# Patient Record
Sex: FEMALE | Race: WHITE | NOT HISPANIC OR LATINO | Employment: OTHER | URBAN - METROPOLITAN AREA
[De-identification: names, ages, dates, MRNs, and addresses within clinical notes are randomized per-mention and may not be internally consistent; named-entity substitution may affect disease eponyms.]

---

## 2017-12-19 LAB — RAPID GROUP A STREP (OHS): POSITIVE

## 2020-12-11 ENCOUNTER — HISTORICAL (OUTPATIENT)
Dept: ADMINISTRATIVE | Facility: HOSPITAL | Age: 65
End: 2020-12-11

## 2021-01-20 ENCOUNTER — HISTORICAL (OUTPATIENT)
Dept: ADMINISTRATIVE | Facility: HOSPITAL | Age: 66
End: 2021-01-20

## 2021-04-29 ENCOUNTER — HISTORICAL (OUTPATIENT)
Dept: ADMINISTRATIVE | Facility: HOSPITAL | Age: 66
End: 2021-04-29

## 2021-04-29 LAB
ABS NEUT (OLG): 3.14 X10(3)/MCL (ref 2.1–9.2)
ALBUMIN SERPL-MCNC: 4.2 GM/DL (ref 3.4–4.8)
ALBUMIN/GLOB SERPL: 1.1 RATIO (ref 1.1–2)
ALP SERPL-CCNC: 32 UNIT/L (ref 40–150)
ALT SERPL-CCNC: 13 UNIT/L (ref 0–55)
AST SERPL-CCNC: 16 UNIT/L (ref 5–34)
BASOPHILS # BLD AUTO: 0.05 X10(3)/MCL (ref 0–0.2)
BASOPHILS NFR BLD AUTO: 0.8 % (ref 0–1)
BILIRUB SERPL-MCNC: 0.3 MG/DL (ref 0.2–1.2)
BILIRUBIN DIRECT+TOT PNL SERPL-MCNC: 0.1 MG/DL (ref 0–0.5)
BILIRUBIN DIRECT+TOT PNL SERPL-MCNC: 0.2 MG/DL (ref 0–0.8)
BUN SERPL-MCNC: 23.2 MG/DL (ref 9.8–20.1)
CALCIUM SERPL-MCNC: 9.8 MG/DL (ref 8.4–10.2)
CHLORIDE SERPL-SCNC: 107 MMOL/L (ref 98–107)
CO2 SERPL-SCNC: 25 MMOL/L (ref 23–31)
CREAT SERPL-MCNC: 0.91 MG/DL (ref 0.57–1.11)
EOSINOPHIL # BLD AUTO: 0.28 X10(3)/MCL (ref 0–0.9)
EOSINOPHIL NFR BLD AUTO: 4.8 % (ref 0–6.4)
ERYTHROCYTE [DISTWIDTH] IN BLOOD BY AUTOMATED COUNT: 12.8 % (ref 11.5–17)
GLOBULIN SER-MCNC: 3.8 GM/DL (ref 2.4–3.5)
GLUCOSE SERPL-MCNC: 102 MG/DL (ref 82–115)
HCT VFR BLD AUTO: 40.8 % (ref 37–47)
HGB BLD-MCNC: 13 GM/DL (ref 12–16)
IMM GRANULOCYTES # BLD AUTO: 0.01 10*3/UL (ref 0–0.02)
IMM GRANULOCYTES NFR BLD AUTO: 0.2 % (ref 0–0.43)
LYMPHOCYTES # BLD AUTO: 1.9 X10(3)/MCL (ref 0.6–4.6)
LYMPHOCYTES NFR BLD AUTO: 32.3 % (ref 16–44)
MCH RBC QN AUTO: 29.8 PG (ref 27–31)
MCHC RBC AUTO-ENTMCNC: 31.9 GM/DL (ref 33–36)
MCV RBC AUTO: 93.6 FL (ref 80–94)
MONOCYTES # BLD AUTO: 0.51 X10(3)/MCL (ref 0.1–1.3)
MONOCYTES NFR BLD AUTO: 8.7 % (ref 4–12.1)
NEUTROPHILS # BLD AUTO: 3.14 X10(3)/MCL (ref 2.1–9.2)
NEUTROPHILS NFR BLD AUTO: 53.2 % (ref 43–73)
NRBC BLD AUTO-RTO: 0 % (ref 0–0.2)
PLATELET # BLD AUTO: 286 X10(3)/MCL (ref 130–400)
PMV BLD AUTO: 10.5 FL (ref 7.4–10.4)
POTASSIUM SERPL-SCNC: 4.6 MMOL/L (ref 3.5–5.1)
PROT SERPL-MCNC: 8 GM/DL (ref 5.8–7.6)
RBC # BLD AUTO: 4.36 X10(6)/MCL (ref 4.2–5.4)
SODIUM SERPL-SCNC: 142 MMOL/L (ref 136–145)
WBC # SPEC AUTO: 5.9 X10(3)/MCL (ref 4.5–11.5)

## 2021-07-14 ENCOUNTER — HISTORICAL (OUTPATIENT)
Dept: LAB | Facility: HOSPITAL | Age: 66
End: 2021-07-14

## 2021-09-23 ENCOUNTER — HISTORICAL (OUTPATIENT)
Dept: LAB | Facility: HOSPITAL | Age: 66
End: 2021-09-23

## 2021-12-29 ENCOUNTER — HISTORICAL (OUTPATIENT)
Dept: LAB | Facility: HOSPITAL | Age: 66
End: 2021-12-29

## 2021-12-29 LAB
ABS NEUT (OLG): 2.81 X10(3)/MCL (ref 2.1–9.2)
ALBUMIN SERPL-MCNC: 4 GM/DL (ref 3.4–4.8)
ALBUMIN/GLOB SERPL: 1.2 RATIO (ref 1.1–2)
ALP SERPL-CCNC: 34 UNIT/L (ref 40–150)
ALT SERPL-CCNC: 12 UNIT/L (ref 0–55)
AST SERPL-CCNC: 16 UNIT/L (ref 5–34)
BASOPHILS # BLD AUTO: 0.03 X10(3)/MCL (ref 0–0.2)
BASOPHILS NFR BLD AUTO: 0.8 % (ref 0–1)
BILIRUB SERPL-MCNC: 1.7 MG/DL (ref 0.2–1.2)
BILIRUBIN DIRECT+TOT PNL SERPL-MCNC: 0.7 MG/DL (ref 0–0.5)
BILIRUBIN DIRECT+TOT PNL SERPL-MCNC: 1 MG/DL (ref 0–0.8)
BUN SERPL-MCNC: 16.9 MG/DL (ref 9.8–20.1)
CALCIUM SERPL-MCNC: 10 MG/DL (ref 8.4–10.2)
CHLORIDE SERPL-SCNC: 104 MMOL/L (ref 98–107)
CO2 SERPL-SCNC: 27 MMOL/L (ref 23–31)
CREAT SERPL-MCNC: 0.8 MG/DL (ref 0.57–1.11)
EOSINOPHIL # BLD AUTO: 0.18 X10(3)/MCL (ref 0–0.9)
EOSINOPHIL NFR BLD AUTO: 4.6 % (ref 0–6.4)
ERYTHROCYTE [DISTWIDTH] IN BLOOD BY AUTOMATED COUNT: 13.1 % (ref 11.5–17)
GLOBULIN SER-MCNC: 3.3 GM/DL (ref 2.4–3.5)
GLUCOSE SERPL-MCNC: 111 MG/DL (ref 82–115)
HCT VFR BLD AUTO: 34.1 % (ref 37–47)
HGB BLD-MCNC: 11.1 GM/DL (ref 12–16)
IMM GRANULOCYTES # BLD AUTO: 0.01 10*3/UL (ref 0–0.02)
IMM GRANULOCYTES NFR BLD AUTO: 0.3 % (ref 0–0.43)
LYMPHOCYTES # BLD AUTO: 0.56 X10(3)/MCL (ref 0.6–4.6)
LYMPHOCYTES NFR BLD AUTO: 14.3 % (ref 16–44)
MCH RBC QN AUTO: 31.2 PG (ref 27–31)
MCHC RBC AUTO-ENTMCNC: 32.6 GM/DL (ref 33–36)
MCV RBC AUTO: 95.8 FL (ref 80–94)
MONOCYTES # BLD AUTO: 0.33 X10(3)/MCL (ref 0.1–1.3)
MONOCYTES NFR BLD AUTO: 8.4 % (ref 4–12.1)
NEUTROPHILS # BLD AUTO: 2.81 X10(3)/MCL (ref 2.1–9.2)
NEUTROPHILS NFR BLD AUTO: 71.6 % (ref 43–73)
NRBC BLD AUTO-RTO: 0 % (ref 0–0.2)
PLATELET # BLD AUTO: 295 X10(3)/MCL (ref 130–400)
PMV BLD AUTO: 9.8 FL (ref 7.4–10.4)
POTASSIUM SERPL-SCNC: 4 MMOL/L (ref 3.5–5.1)
PROT SERPL-MCNC: 7.3 GM/DL (ref 5.8–7.6)
RBC # BLD AUTO: 3.56 X10(6)/MCL (ref 4.2–5.4)
SODIUM SERPL-SCNC: 141 MMOL/L (ref 136–145)
WBC # SPEC AUTO: 3.9 X10(3)/MCL (ref 4.5–11.5)

## 2022-01-12 ENCOUNTER — HISTORICAL (OUTPATIENT)
Dept: ADMINISTRATIVE | Facility: HOSPITAL | Age: 67
End: 2022-01-12

## 2022-01-12 LAB
ABS NEUT (OLG): 4.75 X10(3)/MCL (ref 2.1–9.2)
ALBUMIN SERPL-MCNC: 4 GM/DL (ref 3.4–4.8)
ALBUMIN/GLOB SERPL: 1.1 RATIO (ref 1.1–2)
ALP SERPL-CCNC: 41 UNIT/L (ref 40–150)
ALT SERPL-CCNC: 10 UNIT/L (ref 0–55)
AST SERPL-CCNC: 15 UNIT/L (ref 5–34)
BASOPHILS # BLD AUTO: 0 X10(3)/MCL (ref 0–0.2)
BASOPHILS NFR BLD AUTO: 1 %
BILIRUB SERPL-MCNC: 1.1 MG/DL
BILIRUBIN DIRECT+TOT PNL SERPL-MCNC: 0.5 MG/DL (ref 0–0.5)
BILIRUBIN DIRECT+TOT PNL SERPL-MCNC: 0.6 MG/DL (ref 0–0.8)
BUN SERPL-MCNC: 17.3 MG/DL (ref 9.8–20.1)
CALCIUM SERPL-MCNC: 9.4 MG/DL (ref 8.7–10.5)
CHLORIDE SERPL-SCNC: 105 MMOL/L (ref 98–107)
CO2 SERPL-SCNC: 25 MMOL/L (ref 23–31)
CREAT SERPL-MCNC: 0.79 MG/DL (ref 0.55–1.02)
EOSINOPHIL # BLD AUTO: 0.2 X10(3)/MCL (ref 0–0.9)
EOSINOPHIL NFR BLD AUTO: 4 %
ERYTHROCYTE [DISTWIDTH] IN BLOOD BY AUTOMATED COUNT: 13.7 % (ref 11.5–17)
GLOBULIN SER-MCNC: 3.5 GM/DL (ref 2.4–3.5)
GLUCOSE SERPL-MCNC: 121 MG/DL (ref 82–115)
HCT VFR BLD AUTO: 34 % (ref 37–47)
HGB BLD-MCNC: 10.8 GM/DL (ref 12–16)
LYMPHOCYTES # BLD AUTO: 0.7 X10(3)/MCL (ref 0.6–4.6)
LYMPHOCYTES NFR BLD AUTO: 11 %
MCH RBC QN AUTO: 30.8 PG (ref 27–31)
MCHC RBC AUTO-ENTMCNC: 31.8 GM/DL (ref 33–36)
MCV RBC AUTO: 96.9 FL (ref 80–94)
MONOCYTES # BLD AUTO: 0.6 X10(3)/MCL (ref 0.1–1.3)
MONOCYTES NFR BLD AUTO: 9 %
NEUTROPHILS # BLD AUTO: 4.75 X10(3)/MCL (ref 2.1–9.2)
NEUTROPHILS NFR BLD AUTO: 75 %
PLATELET # BLD AUTO: 299 X10(3)/MCL (ref 130–400)
PMV BLD AUTO: 10.4 FL (ref 9.4–12.4)
POTASSIUM SERPL-SCNC: 3.4 MMOL/L (ref 3.5–5.1)
PROT SERPL-MCNC: 7.5 GM/DL (ref 5.8–7.6)
RBC # BLD AUTO: 3.51 X10(6)/MCL (ref 4.2–5.4)
SODIUM SERPL-SCNC: 140 MMOL/L (ref 136–145)
WBC # SPEC AUTO: 6.3 X10(3)/MCL (ref 4.5–11.5)

## 2022-01-14 ENCOUNTER — HISTORICAL (OUTPATIENT)
Dept: LAB | Facility: HOSPITAL | Age: 67
End: 2022-01-14

## 2022-03-03 ENCOUNTER — HISTORICAL (OUTPATIENT)
Dept: ADMINISTRATIVE | Facility: HOSPITAL | Age: 67
End: 2022-03-03

## 2022-03-03 LAB
ABS NEUT (OLG): 2.87 (ref 2.1–9.2)
ALBUMIN SERPL-MCNC: 4 G/DL (ref 3.4–4.8)
ALBUMIN/GLOB SERPL: 1.2 {RATIO} (ref 1.1–2)
ALP SERPL-CCNC: 35 U/L (ref 40–150)
ALT SERPL-CCNC: 13 U/L (ref 0–55)
AST SERPL-CCNC: 20 U/L (ref 5–34)
BASOPHILS # BLD AUTO: 0 10*3/UL (ref 0–0.2)
BASOPHILS NFR BLD AUTO: 1 %
BILIRUB SERPL-MCNC: 1.2 MG/DL
BILIRUBIN DIRECT+TOT PNL SERPL-MCNC: 0.4 (ref 0–0.5)
BILIRUBIN DIRECT+TOT PNL SERPL-MCNC: 0.8 (ref 0–0.8)
BUN SERPL-MCNC: 18.3 MG/DL (ref 9.8–20.1)
CALCIUM SERPL-MCNC: 10 MG/DL (ref 8.7–10.5)
CHLORIDE SERPL-SCNC: 103 MMOL/L (ref 98–107)
CO2 SERPL-SCNC: 27 MMOL/L (ref 23–31)
CREAT SERPL-MCNC: 0.8 MG/DL (ref 0.55–1.02)
EOSINOPHIL # BLD AUTO: 0.2 10*3/UL (ref 0–0.9)
EOSINOPHIL NFR BLD AUTO: 5 %
ERYTHROCYTE [DISTWIDTH] IN BLOOD BY AUTOMATED COUNT: 13.1 % (ref 11.5–17)
GLOBULIN SER-MCNC: 3.3 G/DL (ref 2.4–3.5)
GLUCOSE SERPL-MCNC: 75 MG/DL (ref 82–115)
HCT VFR BLD AUTO: 34.6 % (ref 37–47)
HEMOLYSIS INTERF INDEX SERPL-ACNC: 13
HGB BLD-MCNC: 11.5 G/DL (ref 12–16)
ICTERIC INTERF INDEX SERPL-ACNC: 1
LIPEMIC INTERF INDEX SERPL-ACNC: 8
LYMPHOCYTES # BLD AUTO: 0.7 10*3/UL (ref 0.6–4.6)
LYMPHOCYTES NFR BLD AUTO: 15 %
MANUAL DIFF? (OHS): NO
MCH RBC QN AUTO: 31.3 PG (ref 27–31)
MCHC RBC AUTO-ENTMCNC: 33.2 G/DL (ref 33–36)
MCV RBC AUTO: 94 FL (ref 80–94)
MONOCYTES # BLD AUTO: 0.6 10*3/UL (ref 0.1–1.3)
MONOCYTES NFR BLD AUTO: 15 %
NEUTROPHILS # BLD AUTO: 2.87 10*3/UL (ref 2.1–9.2)
NEUTROPHILS NFR BLD AUTO: 64 %
PLATELET # BLD AUTO: 312 10*3/UL (ref 130–400)
PMV BLD AUTO: 9.7 FL (ref 9.4–12.4)
POTASSIUM SERPL-SCNC: 4.3 MMOL/L (ref 3.5–5.1)
PROT SERPL-MCNC: 7.3 G/DL (ref 5.8–7.6)
RBC # BLD AUTO: 3.68 10*6/UL (ref 4.2–5.4)
SODIUM SERPL-SCNC: 139 MMOL/L (ref 136–145)
WBC # SPEC AUTO: 4.4 10*3/UL (ref 4.5–11.5)

## 2022-03-10 ENCOUNTER — HISTORICAL (OUTPATIENT)
Dept: LAB | Facility: HOSPITAL | Age: 67
End: 2022-03-10

## 2022-04-09 ENCOUNTER — HISTORICAL (OUTPATIENT)
Dept: ADMINISTRATIVE | Facility: HOSPITAL | Age: 67
End: 2022-04-09
Payer: MEDICARE

## 2022-04-27 VITALS
OXYGEN SATURATION: 96 % | BODY MASS INDEX: 34.46 KG/M2 | SYSTOLIC BLOOD PRESSURE: 135 MMHG | DIASTOLIC BLOOD PRESSURE: 78 MMHG | HEIGHT: 65 IN | WEIGHT: 206.81 LBS

## 2022-05-30 DIAGNOSIS — A31.0 PULMONARY DISEASE DUE TO MYCOBACTERIA: ICD-10-CM

## 2022-05-30 DIAGNOSIS — A31.0 PULMONARY MYCOBACTERIUM AVIUM COMPLEX (MAC) INFECTION: Primary | ICD-10-CM

## 2022-05-30 RX ORDER — AZITHROMYCIN 500 MG/1
500 TABLET, FILM COATED ORAL DAILY
Qty: 30 TABLET | Refills: 3 | Status: SHIPPED | OUTPATIENT
Start: 2022-05-30 | End: 2022-06-02 | Stop reason: SDUPTHER

## 2022-05-30 RX ORDER — RIFAMPIN 300 MG/1
600 CAPSULE ORAL DAILY
Qty: 30 CAPSULE | Refills: 3 | Status: SHIPPED | OUTPATIENT
Start: 2022-05-30 | End: 2022-06-02 | Stop reason: SDUPTHER

## 2022-06-02 ENCOUNTER — OFFICE VISIT (OUTPATIENT)
Dept: INFECTIOUS DISEASES | Facility: CLINIC | Age: 67
End: 2022-06-02
Payer: MEDICARE

## 2022-06-02 VITALS
TEMPERATURE: 98 F | HEIGHT: 64 IN | HEART RATE: 70 BPM | BODY MASS INDEX: 35 KG/M2 | OXYGEN SATURATION: 97 % | DIASTOLIC BLOOD PRESSURE: 74 MMHG | SYSTOLIC BLOOD PRESSURE: 135 MMHG | RESPIRATION RATE: 18 BRPM | WEIGHT: 205 LBS

## 2022-06-02 DIAGNOSIS — A31.0 PULMONARY DISEASE DUE TO MYCOBACTERIA: Primary | ICD-10-CM

## 2022-06-02 DIAGNOSIS — A31.0 PULMONARY MYCOBACTERIUM AVIUM COMPLEX (MAC) INFECTION: ICD-10-CM

## 2022-06-02 DIAGNOSIS — R05.3 CHRONIC COUGH: ICD-10-CM

## 2022-06-02 PROCEDURE — 99214 OFFICE O/P EST MOD 30 MIN: CPT | Mod: S$PBB,,, | Performed by: GENERAL PRACTICE

## 2022-06-02 PROCEDURE — 99999 PR PBB SHADOW E&M-EST. PATIENT-LVL III: CPT | Mod: PBBFAC,,, | Performed by: GENERAL PRACTICE

## 2022-06-02 PROCEDURE — 99214 PR OFFICE/OUTPT VISIT, EST, LEVL IV, 30-39 MIN: ICD-10-PCS | Mod: S$PBB,,, | Performed by: GENERAL PRACTICE

## 2022-06-02 PROCEDURE — 99213 OFFICE O/P EST LOW 20 MIN: CPT | Mod: PBBFAC | Performed by: GENERAL PRACTICE

## 2022-06-02 PROCEDURE — 99999 PR PBB SHADOW E&M-EST. PATIENT-LVL III: ICD-10-PCS | Mod: PBBFAC,,, | Performed by: GENERAL PRACTICE

## 2022-06-02 RX ORDER — PANTOPRAZOLE SODIUM 40 MG/1
40 TABLET, DELAYED RELEASE ORAL DAILY
COMMUNITY
Start: 2022-05-21 | End: 2022-11-17

## 2022-06-02 RX ORDER — ETHAMBUTOL HYDROCHLORIDE 400 MG/1
400 TABLET, FILM COATED ORAL
COMMUNITY
Start: 2022-03-03 | End: 2022-06-02 | Stop reason: SDUPTHER

## 2022-06-02 RX ORDER — ACETAMINOPHEN 500 MG
TABLET ORAL
COMMUNITY
End: 2022-06-02 | Stop reason: CLARIF

## 2022-06-02 RX ORDER — AZITHROMYCIN 500 MG/1
500 TABLET, FILM COATED ORAL DAILY
COMMUNITY
Start: 2022-04-02 | End: 2022-06-02 | Stop reason: CLARIF

## 2022-06-02 RX ORDER — SERTRALINE HYDROCHLORIDE 100 MG/1
100 TABLET, FILM COATED ORAL DAILY
COMMUNITY
Start: 2022-05-21

## 2022-06-02 RX ORDER — PROPRANOLOL HYDROCHLORIDE 80 MG/1
80 TABLET ORAL DAILY
COMMUNITY
Start: 2022-05-21

## 2022-06-02 RX ORDER — GLUCOSAMINE/CHONDRO SU A 500-400 MG
1 TABLET ORAL DAILY
COMMUNITY

## 2022-06-02 RX ORDER — CHOLECALCIFEROL (VITAMIN D3) 1250 MCG
50000 CAPSULE ORAL
COMMUNITY
Start: 2022-05-31 | End: 2022-11-17

## 2022-06-02 RX ORDER — FENOFIBRATE 145 MG/1
145 TABLET, FILM COATED ORAL
COMMUNITY

## 2022-06-02 RX ORDER — HYDROCHLOROTHIAZIDE 12.5 MG/1
12.5 TABLET ORAL DAILY
COMMUNITY
Start: 2022-04-21 | End: 2022-11-17

## 2022-06-02 RX ORDER — FISH OIL/DHA/EPA 1200-144MG
CAPSULE ORAL
COMMUNITY

## 2022-06-02 NOTE — PROGRESS NOTES
Subjective:       Patient ID: Carmen Mckinley 67 y.o.     Chief Complaint:   Chief Complaint   Patient presents with    Follow-up     Pulmonary mac      Pulmonary mycobacterium avium complex        1/11/2022:  66-year-old female patient known to have past medical history of pulmonary SAE, diagnosed in January 2021 presents for follow-up.   The patient had been on 3 times weekly azithromycin, ethambutol, rifampin from January 2021 up until December 2021.  She had significant improvement in her imaging between January 2021 and April 2021.  However her sputum AFB cultures have remained positive up until most recently in September 2021.  Patient contacted our office again in December 2021 after noticing her cultures remained positive.  At that point, I had multiple discussions with the patient as documented in the chart prior to today's visit, and switched her medication regimen to daily administration of rifampin, ethambutol, azithromycin.  Most recent susceptibility testing shows isolate is still sensitive to macrolides.   She was also experiencing side effects from the rifampin intermittent dosing, and the shape of flulike symptoms.  Since switching to daily dosing of the medication, she reports that her symptoms have subsided.  She notes ongoing cough however no significant changes in its frequency and no increase in sputum production.  She denies any weight loss, no fevers.  No shortness of breath.  She also denies any abdominal pain, no diarrhea, no changes in the urine color or in the stool color.  She denies any fatigue and reports normal appetite.  Most recent blood work done 12/29/2021, showing mild hyperbilirubinemia.  Otherwise normal LFTs. (1)    03/03/2022:   Patient presents today for follow-up.  She reports her cough is minimal and does not have any shortness of breath.  No fevers, no chills, no weight loss, no night sweats.  At our last visit we had paged her regimen from intermittent dosing to daily  "dosing of azithromycin, rifampin, ethambutol given that she has not cleared her sputum cultures yet despite about a year of therapy.  She reports good tolerability to the antimicrobials.    2022:  No fevers, no chills, continues to have a mild cough especially in the morning. Otherwise no weight loss and continues with activities of daily living. She is taking Rifampin, Azithromycin, Ethambutol daily at this time. Her most recent cultures collected 2022 remain positive after 3 months on daily regimen. Repeat CT scan stable from prior.       Past Medical History:   Diagnosis Date    Anxiety     Arthritis     Depression     GERD (gastroesophageal reflux disease)     Hyperlipidemia     Hypertension         Past Surgical History:   Procedure Laterality Date    APPENDECTOMY       SECTION      ECTOPIC PREGNANCY SURGERY      FUNCTIONAL ENDOSCOPIC SINUS SURGERY (FESS) Bilateral     MANDIBLE SURGERY Bilateral     WRIST FRACTURE SURGERY          Social History     Socioeconomic History    Marital status:    Tobacco Use    Smoking status: Former Smoker    Smokeless tobacco: Never Used        Family History   Problem Relation Age of Onset    Asbestos Mother     Suicide Father         Review of patient's allergies indicates:  No Known Allergies       There is no immunization history on file for this patient.     Review of Systems   All other systems reviewed and are negative.         Objective:      /74 (BP Location: Right arm)   Pulse 70   Temp 98.2 °F (36.8 °C)   Resp 18   Ht 5' 4" (1.626 m)   Wt 93 kg (205 lb 0.4 oz)   SpO2 97%   BMI 35.19 kg/m²      Physical Exam  Constitutional:       Appearance: Normal appearance.   HENT:      Head: Normocephalic and atraumatic.      Mouth/Throat:      Pharynx: No oropharyngeal exudate or posterior oropharyngeal erythema.   Eyes:      Extraocular Movements: Extraocular movements intact.      Pupils: Pupils are equal, round, and reactive " to light.   Cardiovascular:      Rate and Rhythm: Normal rate and regular rhythm.      Heart sounds: No murmur heard.  Pulmonary:      Effort: No respiratory distress.      Breath sounds: No wheezing, rhonchi or rales.   Abdominal:      General: Bowel sounds are normal. There is no distension.      Palpations: Abdomen is soft.      Tenderness: There is no abdominal tenderness. There is no right CVA tenderness or left CVA tenderness.   Musculoskeletal:         General: No swelling or tenderness.      Cervical back: Neck supple. No rigidity or tenderness.   Lymphadenopathy:      Cervical: No cervical adenopathy.   Skin:     Findings: No lesion or rash.   Neurological:      General: No focal deficit present.      Mental Status: She is alert and oriented to person, place, and time. Mental status is at baseline.      Cranial Nerves: No cranial nerve deficit.      Motor: No weakness.   Psychiatric:         Mood and Affect: Mood normal.         Behavior: Behavior normal.            Assessment:       Problem List Items Addressed This Visit        Pulmonary    Chronic cough       ID    Pulmonary Mycobacterium avium complex (MAC) infection - Primary    Relevant Medications    rifAMpin (RIFADIN) 300 MG capsule    azithromycin (ZITHROMAX) 500 MG tablet    ethambutoL (MYAMBUTOL) 400 MG Tab             Plan:       -unfortunately sputum remains positive for MAC in 03/2022 but no reported susceptibilities  -I have discussed this with [facility] who are contacting [facility] to set those susceptibilities  -Will repeat sputum sample 6 months after starting daily regimen as well as susceptibilities again  -If those remain positive, will have to resort to 3 times weekly Amikacin therapy unless resistance is noted in most recent cultures  -Discussed with patient in detail, will set up closer follow up time in order to review most recent sputum cultures results and determine plan      Follow up in about 6 weeks (around 7/14/2022).

## 2022-06-03 RX ORDER — RIFAMPIN 300 MG/1
600 CAPSULE ORAL DAILY
Qty: 30 CAPSULE | Refills: 3 | Status: SHIPPED | OUTPATIENT
Start: 2022-06-03 | End: 2022-06-07 | Stop reason: SDUPTHER

## 2022-06-03 RX ORDER — AZITHROMYCIN 500 MG/1
500 TABLET, FILM COATED ORAL DAILY
Qty: 30 TABLET | Refills: 3 | Status: SHIPPED | OUTPATIENT
Start: 2022-06-03 | End: 2022-07-28 | Stop reason: SDUPTHER

## 2022-06-03 RX ORDER — ETHAMBUTOL HYDROCHLORIDE 400 MG/1
400 TABLET, FILM COATED ORAL DAILY
Qty: 30 TABLET | Refills: 5 | Status: SHIPPED | OUTPATIENT
Start: 2022-06-03 | End: 2022-06-07 | Stop reason: SDUPTHER

## 2022-06-05 PROBLEM — J43.9 PULMONARY EMPHYSEMA: Status: ACTIVE | Noted: 2018-05-23

## 2022-06-05 PROBLEM — R05.3 CHRONIC COUGH: Status: ACTIVE | Noted: 2022-06-05

## 2022-06-05 PROBLEM — A43.9: Status: ACTIVE | Noted: 2018-06-19

## 2022-06-05 PROBLEM — E78.00 HYPERCHOLESTEROLEMIA: Status: ACTIVE | Noted: 2022-06-05

## 2022-06-05 PROBLEM — I10 PRIMARY HYPERTENSION: Status: ACTIVE | Noted: 2022-06-05

## 2022-06-05 PROBLEM — A31.0: Status: ACTIVE | Noted: 2022-06-05

## 2022-06-05 PROBLEM — K21.9 GASTROESOPHAGEAL REFLUX DISEASE: Status: ACTIVE | Noted: 2022-06-05

## 2022-06-05 PROBLEM — F32.A DEPRESSIVE DISORDER: Status: ACTIVE | Noted: 2022-06-05

## 2022-06-06 PROCEDURE — 87116 MYCOBACTERIA CULTURE: CPT | Performed by: GENERAL PRACTICE

## 2022-06-06 PROCEDURE — 87206 SMEAR FLUORESCENT/ACID STAI: CPT | Performed by: GENERAL PRACTICE

## 2022-06-07 RX ORDER — ETHAMBUTOL HYDROCHLORIDE 400 MG/1
TABLET, FILM COATED ORAL
Qty: 105 TABLET | Refills: 5 | Status: SHIPPED | OUTPATIENT
Start: 2022-06-07 | End: 2022-07-28 | Stop reason: SDUPTHER

## 2022-06-07 RX ORDER — RIFAMPIN 300 MG/1
600 CAPSULE ORAL DAILY
Qty: 90 CAPSULE | Refills: 5 | Status: SHIPPED | OUTPATIENT
Start: 2022-06-07 | End: 2022-07-28 | Stop reason: SDUPTHER

## 2022-06-08 LAB — M AVIUM PARATB TISS QL ZN STN: NORMAL

## 2022-07-22 LAB — MYCOBACTERIUM SPEC QL CULT: ABNORMAL

## 2022-07-28 ENCOUNTER — TELEPHONE (OUTPATIENT)
Dept: INFECTIOUS DISEASES | Facility: CLINIC | Age: 67
End: 2022-07-28
Payer: MEDICARE

## 2022-07-28 ENCOUNTER — OFFICE VISIT (OUTPATIENT)
Dept: INFECTIOUS DISEASES | Facility: CLINIC | Age: 67
End: 2022-07-28
Payer: MEDICARE

## 2022-07-28 VITALS
DIASTOLIC BLOOD PRESSURE: 71 MMHG | SYSTOLIC BLOOD PRESSURE: 137 MMHG | OXYGEN SATURATION: 96 % | RESPIRATION RATE: 18 BRPM | HEART RATE: 76 BPM | BODY MASS INDEX: 36.1 KG/M2 | TEMPERATURE: 98 F | WEIGHT: 211.44 LBS | HEIGHT: 64 IN

## 2022-07-28 DIAGNOSIS — A31.0 PULMONARY DISEASE DUE TO MYCOBACTERIA: ICD-10-CM

## 2022-07-28 DIAGNOSIS — A31.0 MAI (MYCOBACTERIUM AVIUM-INTRACELLULARE): Primary | ICD-10-CM

## 2022-07-28 DIAGNOSIS — A31.0 PULMONARY MYCOBACTERIUM AVIUM COMPLEX (MAC) INFECTION: ICD-10-CM

## 2022-07-28 DIAGNOSIS — H91.90 HEARING LOSS, UNSPECIFIED HEARING LOSS TYPE, UNSPECIFIED LATERALITY: ICD-10-CM

## 2022-07-28 PROCEDURE — 99215 OFFICE O/P EST HI 40 MIN: CPT | Mod: S$PBB,,, | Performed by: GENERAL PRACTICE

## 2022-07-28 PROCEDURE — 99999 PR PBB SHADOW E&M-EST. PATIENT-LVL V: CPT | Mod: PBBFAC,,, | Performed by: GENERAL PRACTICE

## 2022-07-28 PROCEDURE — 99215 PR OFFICE/OUTPT VISIT, EST, LEVL V, 40-54 MIN: ICD-10-PCS | Mod: S$PBB,,, | Performed by: GENERAL PRACTICE

## 2022-07-28 PROCEDURE — 99215 OFFICE O/P EST HI 40 MIN: CPT | Mod: PBBFAC | Performed by: GENERAL PRACTICE

## 2022-07-28 PROCEDURE — 99999 PR PBB SHADOW E&M-EST. PATIENT-LVL V: ICD-10-PCS | Mod: PBBFAC,,, | Performed by: GENERAL PRACTICE

## 2022-07-28 RX ORDER — RIFAMPIN 300 MG/1
600 CAPSULE ORAL DAILY
Qty: 180 CAPSULE | Refills: 1 | Status: SHIPPED | OUTPATIENT
Start: 2022-07-28 | End: 2023-01-19 | Stop reason: SDUPTHER

## 2022-07-28 RX ORDER — ETHAMBUTOL HYDROCHLORIDE 400 MG/1
TABLET, FILM COATED ORAL
Qty: 315 TABLET | Refills: 1 | Status: SHIPPED | OUTPATIENT
Start: 2022-07-28 | End: 2023-01-19 | Stop reason: SDUPTHER

## 2022-07-28 RX ORDER — AZITHROMYCIN 500 MG/1
500 TABLET, FILM COATED ORAL DAILY
Qty: 90 TABLET | Refills: 1 | Status: SHIPPED | OUTPATIENT
Start: 2022-07-28 | End: 2023-01-19 | Stop reason: SDUPTHER

## 2022-07-28 RX ORDER — HEPARIN 100 UNIT/ML
500 SYRINGE INTRAVENOUS
Status: CANCELLED | OUTPATIENT
Start: 2022-08-08

## 2022-07-28 NOTE — PROGRESS NOTES
Subjective:       Patient ID: Carmen Mckinley 67 y.o.     Chief Complaint:   Chief Complaint   Patient presents with    Follow-up    MAC        1/11/2022:  66-year-old female patient known to have past medical history of pulmonary SAE, diagnosed in January 2021 presents for follow-up.   The patient had been on 3 times weekly azithromycin, ethambutol, rifampin from January 2021 up until December 2021.  She had significant improvement in her imaging between January 2021 and April 2021.  However her sputum AFB cultures have remained positive up until most recently in September 2021.  Patient contacted our office again in December 2021 after noticing her cultures remained positive.  At that point, I had multiple discussions with the patient as documented in the chart prior to today's visit, and switched her medication regimen to daily administration of rifampin, ethambutol, azithromycin.  Most recent susceptibility testing shows isolate is still sensitive to macrolides.   She was also experiencing side effects from the rifampin intermittent dosing, and the shape of flulike symptoms.  Since switching to daily dosing of the medication, she reports that her symptoms have subsided.  She notes ongoing cough however no significant changes in its frequency and no increase in sputum production.  She denies any weight loss, no fevers.  No shortness of breath.  She also denies any abdominal pain, no diarrhea, no changes in the urine color or in the stool color.  She denies any fatigue and reports normal appetite.  Most recent blood work done 12/29/2021, showing mild hyperbilirubinemia.  Otherwise normal LFTs. (1)    03/03/2022:   Patient presents today for follow-up.  She reports her cough is minimal and does not have any shortness of breath.  No fevers, no chills, no weight loss, no night sweats.  At our last visit we had paged her regimen from intermittent dosing to daily dosing of azithromycin, rifampin, ethambutol given that  "she has not cleared her sputum cultures yet despite about a year of therapy.  She reports good tolerability to the antimicrobials.    2022:  No fevers, no chills, continues to have a mild cough especially in the morning. Otherwise no weight loss and continues with activities of daily living. She is taking Rifampin, Azithromycin, Ethambutol daily at this time. Her most recent cultures collected 2022 remain positive after 3 months on daily regimen. Repeat CT scan stable from prior.    2022  Presents for follow up. Had COVID since last visit but only mild symptoms. Cx sputum from 2022 and 2022 remain positive for SAE despite 6 months of the daily dosing regimen. Susceptibilities from 2022 with no resistance to Macrolides and S to Aminoglycosides. Otherwise clinically feels well, no fevers, no chills.     Follow-up         Past Medical History:   Diagnosis Date    Anxiety     Arthritis     Depression     GERD (gastroesophageal reflux disease)     Hyperlipidemia     Hypertension         Past Surgical History:   Procedure Laterality Date    APPENDECTOMY       SECTION      ECTOPIC PREGNANCY SURGERY      FUNCTIONAL ENDOSCOPIC SINUS SURGERY (FESS) Bilateral     MANDIBLE SURGERY Bilateral     WRIST FRACTURE SURGERY          Social History     Socioeconomic History    Marital status:    Tobacco Use    Smoking status: Former Smoker    Smokeless tobacco: Never Used        Family History   Problem Relation Age of Onset    Asbestos Mother     Suicide Father         Review of patient's allergies indicates:  No Known Allergies       There is no immunization history on file for this patient.     Review of Systems   All other systems reviewed and are negative.         Objective:      /71 (BP Location: Right arm)   Pulse 76   Temp 98.2 °F (36.8 °C)   Resp 18   Ht 5' 4" (1.626 m)   Wt 95.9 kg (211 lb 6.7 oz)   SpO2 96%   BMI 36.29 kg/m²      Physical " Exam  Constitutional:       Appearance: Normal appearance.   HENT:      Head: Normocephalic and atraumatic.      Mouth/Throat:      Pharynx: No oropharyngeal exudate or posterior oropharyngeal erythema.   Eyes:      Extraocular Movements: Extraocular movements intact.      Pupils: Pupils are equal, round, and reactive to light.   Cardiovascular:      Rate and Rhythm: Normal rate and regular rhythm.      Heart sounds: No murmur heard.  Pulmonary:      Effort: No respiratory distress.      Breath sounds: No wheezing, rhonchi or rales.   Abdominal:      General: Bowel sounds are normal. There is no distension.      Palpations: Abdomen is soft.      Tenderness: There is no abdominal tenderness. There is no right CVA tenderness or left CVA tenderness.   Musculoskeletal:         General: No swelling or tenderness.      Cervical back: Neck supple. No rigidity or tenderness.   Lymphadenopathy:      Cervical: No cervical adenopathy.   Skin:     Findings: No lesion or rash.   Neurological:      General: No focal deficit present.      Mental Status: She is alert and oriented to person, place, and time. Mental status is at baseline.      Cranial Nerves: No cranial nerve deficit.      Motor: No weakness.   Psychiatric:         Mood and Affect: Mood normal.         Behavior: Behavior normal.            Assessment:       Problem List Items Addressed This Visit        ID    Pulmonary Mycobacterium avium complex (MAC) infection    Relevant Medications    azithromycin (ZITHROMAX) 500 MG tablet    rifAMpin (RIFADIN) 300 MG capsule    ethambutoL (MYAMBUTOL) 400 MG Tab    SAE (mycobacterium avium-intracellulare) - Primary    Relevant Orders    CBC Auto Differential (Completed)    Comprehensive Metabolic Panel (Completed)    CT Chest Without Contrast    Ambulatory referral/consult to Audiology    Ambulatory referral/consult to Interventional Radiology      Other Visit Diagnoses     Hearing loss, unspecified hearing loss type, unspecified  laterality        Relevant Orders    Ambulatory referral/consult to Audiology    Pulmonary disease due to mycobacteria        Relevant Medications    ethambutoL (MYAMBUTOL) 400 MG Tab             Plan:       -Sputum cultures from 04/2022 and 06/2022 remain positive for MAC despite daily dosing regimen  -Next step in therapy at this time would be to add Aminoglycoside to the current regimen  -Started treatment plan with [facility]  -Will add consult for IR to insert PICC line  -refilled medication  -Refer to audiometry in order yo have a baseline prior to starting Amikacin  -Repeat CT scan as most recent is about 1 year ago    Follow up in about 3 months (around 10/28/2022).

## 2022-07-28 NOTE — TELEPHONE ENCOUNTER
Called central scheduling to for CT Chest at [facility], Medicare, no pa needed.  Tuesday 08/30/22   Arrival 945  Start 1015    Called patient and advised of CT date and time.  Voiced understanding.

## 2022-08-03 ENCOUNTER — CLINICAL SUPPORT (OUTPATIENT)
Dept: AUDIOLOGY | Facility: HOSPITAL | Age: 67
End: 2022-08-03
Payer: MEDICARE

## 2022-08-03 DIAGNOSIS — H90.3 SENSORINEURAL HEARING LOSS, BILATERAL: Primary | ICD-10-CM

## 2022-08-03 DIAGNOSIS — A31.0 MAI (MYCOBACTERIUM AVIUM-INTRACELLULARE): ICD-10-CM

## 2022-08-03 DIAGNOSIS — A31.0 MAI (MYCOBACTERIUM AVIUM-INTRACELLULARE): Primary | ICD-10-CM

## 2022-08-03 DIAGNOSIS — H91.90 HEARING LOSS, UNSPECIFIED HEARING LOSS TYPE, UNSPECIFIED LATERALITY: ICD-10-CM

## 2022-08-03 PROCEDURE — 92557 COMPREHENSIVE HEARING TEST: CPT | Performed by: AUDIOLOGIST

## 2022-08-03 PROCEDURE — 92567 TYMPANOMETRY: CPT | Performed by: AUDIOLOGIST

## 2022-08-03 NOTE — PROGRESS NOTES
Hearing Evaluation        Patient History: Mrs. Mckinley is in for a baseline audio prior to start of ototoxic medication. She reports a long-standing hearing loss, onset years ago.  She also reports constant bilateral tinnitus.  Vertigo and middle ear issues are denied at this time. All additional history is unremarkable.        Test Results:                    Pure Tone Testing:      Right ear:       Mild to moderately severe sensorineural hearing loss from 1k-8kHz. Speech reception threshold is in agreement with puretone findings. Discrimination score of 92% is considered excellent.        Left ear:          Mild to moderately severe sensorineural hearing loss from 1k-8kHz. Speech reception threshold is in agreement with puretone findings.  Discrimination score of 92% is considered excellent.                                                                                 Tympanometry:                                           Right ear:       Type 'A' tymp, normal middle ear pressure/function     Left ear:          Type 'A' tymp, normal middle ear pressure/function                                           Interpretations:      Behavioral test findings indicate a mild to moderately severe high frequency SNHL, bilaterally. Speech reception thresholds obtained at 20dB, AU, and are in agreement with puretone findings. Speech discrimination scores of 92%, AU, are considered excellent.  Immittance measures indicate normal middle ear pressure/function, bilaterally.            Recommendations:   Repeat hearing evaluation one month after medication intake, then 3 months following initial intake.  Patient also instructed to notify PCP if any changes in hearing or tinnitus occur within this time frame and hearing should be re-evaluated at that time.

## 2022-08-08 ENCOUNTER — TELEPHONE (OUTPATIENT)
Dept: INFECTIOUS DISEASES | Facility: CLINIC | Age: 67
End: 2022-08-08
Payer: MEDICARE

## 2022-08-08 DIAGNOSIS — Z79.2 LONG TERM (CURRENT) USE OF ANTIBIOTICS: Primary | ICD-10-CM

## 2022-08-08 DIAGNOSIS — A31.0 MAI (MYCOBACTERIUM AVIUM-INTRACELLULARE) INFECTION: ICD-10-CM

## 2022-08-08 NOTE — TELEPHONE ENCOUNTER
Called patient advised picc line insertion on Thursday 08/11/22 at 0845. Instructed npo after midnight.  Voiced understanding.

## 2022-08-08 NOTE — TELEPHONE ENCOUNTER
Called outpatient surgery to schedule picc line insertion.  Spoke with Bethany, placed patient on schedule Thursday 08/13/22 at 0845 on 2nd floor.  Order needs to be placed in case request, sending workflow sheet for instructions to place in epic.

## 2022-08-10 ENCOUNTER — INFUSION (OUTPATIENT)
Dept: INFUSION THERAPY | Facility: HOSPITAL | Age: 67
End: 2022-08-10
Attending: GENERAL PRACTICE
Payer: MEDICARE

## 2022-08-10 ENCOUNTER — PATIENT MESSAGE (OUTPATIENT)
Dept: ADMINISTRATIVE | Facility: OTHER | Age: 67
End: 2022-08-10
Payer: MEDICARE

## 2022-08-10 VITALS
OXYGEN SATURATION: 94 % | BODY MASS INDEX: 35.53 KG/M2 | TEMPERATURE: 98 F | SYSTOLIC BLOOD PRESSURE: 161 MMHG | HEART RATE: 75 BPM | WEIGHT: 207 LBS | DIASTOLIC BLOOD PRESSURE: 72 MMHG

## 2022-08-10 DIAGNOSIS — A31.0 MAI (MYCOBACTERIUM AVIUM-INTRACELLULARE): Primary | ICD-10-CM

## 2022-08-10 PROCEDURE — 25000003 PHARM REV CODE 250: Performed by: GENERAL PRACTICE

## 2022-08-10 PROCEDURE — 63600175 PHARM REV CODE 636 W HCPCS: Performed by: GENERAL PRACTICE

## 2022-08-10 PROCEDURE — 96365 THER/PROPH/DIAG IV INF INIT: CPT

## 2022-08-10 RX ORDER — HEPARIN 100 UNIT/ML
500 SYRINGE INTRAVENOUS
Status: CANCELLED | OUTPATIENT
Start: 2022-08-12

## 2022-08-10 RX ORDER — HEPARIN 100 UNIT/ML
500 SYRINGE INTRAVENOUS
Status: DISCONTINUED | OUTPATIENT
Start: 2022-08-10 | End: 2022-08-10 | Stop reason: HOSPADM

## 2022-08-10 RX ADMIN — AMIKACIN SULFATE 1875 MG: 250 INJECTION, SOLUTION INTRAMUSCULAR; INTRAVENOUS at 02:08

## 2022-08-10 NOTE — PHARMACY MED REC
Patient to have amikacin trough drawn prior to third dose on 8/15 with a goal of <5 ug/ml. Patient also needs a peak drawn on same day 30 minutes after infusion for a goal of 65-80 ug/ml. Adjust dosage as necessary to maintain these levels. Contact Zhanna Escudero, Antimicrobial Stewardship Pharmacist with assistance at [phone].

## 2022-08-11 ENCOUNTER — HOSPITAL ENCOUNTER (OUTPATIENT)
Facility: HOSPITAL | Age: 67
Discharge: HOME | End: 2022-08-11
Attending: GENERAL PRACTICE | Admitting: GENERAL PRACTICE
Payer: MEDICARE

## 2022-08-11 DIAGNOSIS — A31.0 MYCOBACTERIUM AVIUM-INTRACELLULARE COMPLEX: ICD-10-CM

## 2022-08-11 PROCEDURE — C1751 CATH, INF, PER/CENT/MIDLINE: HCPCS

## 2022-08-11 PROCEDURE — 36569 INSJ PICC 5 YR+ W/O IMAGING: CPT

## 2022-08-11 RX ORDER — SODIUM CHLORIDE 0.9 % (FLUSH) 0.9 %
10 SYRINGE (ML) INJECTION EVERY 6 HOURS
Status: DISCONTINUED | OUTPATIENT
Start: 2022-08-11 | End: 2022-08-11 | Stop reason: HOSPADM

## 2022-08-11 RX ORDER — SODIUM CHLORIDE 0.9 % (FLUSH) 0.9 %
10 SYRINGE (ML) INJECTION
Status: DISCONTINUED | OUTPATIENT
Start: 2022-08-11 | End: 2022-08-11 | Stop reason: HOSPADM

## 2022-08-11 NOTE — PROCEDURES
Carmen Mckinley is a 67 y.o. female patient.         PICC  Time out: Immediately prior to procedure a time out was called to verify the correct patient, procedure, equipment, support staff and site/side marked as required  Indications: med administration  Local anesthetic: lidocaine 1% without epinephrine    Preparation: skin prepped with ChloraPrep  Skin prep agent dried: skin prep agent completely dried prior to procedure  Sterile barriers: all five maximum sterile barriers used - cap, mask, sterile gown, sterile gloves, and large sterile sheet  Hand hygiene: hand hygiene performed prior to central venous catheter insertion  Location details: right basilic  Catheter type: double lumen  Catheter size: 5 Fr  Catheter Length: 36cm    Ultrasound guidance: yes  Needle advanced into vessel with real time Ultrasound guidance.  Guidewire confirmed in vessel.  Sterile sheath used.      Arm circumference 30 cm      Newton Coombs RN  8/11/2022

## 2022-08-12 ENCOUNTER — INFUSION (OUTPATIENT)
Dept: INFUSION THERAPY | Facility: HOSPITAL | Age: 67
End: 2022-08-12
Attending: GENERAL PRACTICE
Payer: MEDICARE

## 2022-08-12 VITALS
TEMPERATURE: 99 F | SYSTOLIC BLOOD PRESSURE: 150 MMHG | OXYGEN SATURATION: 96 % | HEART RATE: 91 BPM | RESPIRATION RATE: 16 BRPM | DIASTOLIC BLOOD PRESSURE: 69 MMHG

## 2022-08-12 DIAGNOSIS — A31.0 MAI (MYCOBACTERIUM AVIUM-INTRACELLULARE): Primary | ICD-10-CM

## 2022-08-12 PROCEDURE — 25000003 PHARM REV CODE 250: Performed by: GENERAL PRACTICE

## 2022-08-12 PROCEDURE — 63600175 PHARM REV CODE 636 W HCPCS: Performed by: GENERAL PRACTICE

## 2022-08-12 PROCEDURE — 96523 IRRIG DRUG DELIVERY DEVICE: CPT

## 2022-08-12 PROCEDURE — 96365 THER/PROPH/DIAG IV INF INIT: CPT

## 2022-08-12 RX ORDER — HEPARIN 100 UNIT/ML
500 SYRINGE INTRAVENOUS
Status: CANCELLED | OUTPATIENT
Start: 2022-08-14

## 2022-08-12 RX ORDER — HEPARIN 100 UNIT/ML
500 SYRINGE INTRAVENOUS
Status: DISCONTINUED | OUTPATIENT
Start: 2022-08-12 | End: 2022-08-12 | Stop reason: HOSPADM

## 2022-08-12 RX ADMIN — HEPARIN 500 UNITS: 100 SYRINGE at 10:08

## 2022-08-12 RX ADMIN — AMIKACIN SULFATE 1875 MG: 250 INJECTION INTRAMUSCULAR; INTRAVENOUS at 09:08

## 2022-08-12 NOTE — PLAN OF CARE
Pt tolerated infusion and PICC care well. Future appts reviewed. Pt denied questions or further needs at the time of discharge.

## 2022-08-15 ENCOUNTER — INFUSION (OUTPATIENT)
Dept: INFUSION THERAPY | Facility: HOSPITAL | Age: 67
End: 2022-08-15
Attending: GENERAL PRACTICE
Payer: MEDICARE

## 2022-08-15 VITALS
RESPIRATION RATE: 16 BRPM | SYSTOLIC BLOOD PRESSURE: 148 MMHG | HEART RATE: 98 BPM | TEMPERATURE: 98 F | DIASTOLIC BLOOD PRESSURE: 77 MMHG

## 2022-08-15 DIAGNOSIS — A31.0 MAI (MYCOBACTERIUM AVIUM-INTRACELLULARE): Primary | ICD-10-CM

## 2022-08-15 PROCEDURE — 36415 COLL VENOUS BLD VENIPUNCTURE: CPT

## 2022-08-15 PROCEDURE — 80150 ASSAY OF AMIKACIN: CPT | Performed by: GENERAL PRACTICE

## 2022-08-15 PROCEDURE — 63600175 PHARM REV CODE 636 W HCPCS: Performed by: GENERAL PRACTICE

## 2022-08-15 PROCEDURE — 96365 THER/PROPH/DIAG IV INF INIT: CPT

## 2022-08-15 PROCEDURE — 36592 COLLECT BLOOD FROM PICC: CPT

## 2022-08-15 PROCEDURE — 25000003 PHARM REV CODE 250: Performed by: GENERAL PRACTICE

## 2022-08-15 RX ORDER — HEPARIN 100 UNIT/ML
500 SYRINGE INTRAVENOUS
Status: DISCONTINUED | OUTPATIENT
Start: 2022-08-15 | End: 2022-08-15 | Stop reason: HOSPADM

## 2022-08-15 RX ORDER — HEPARIN 100 UNIT/ML
500 SYRINGE INTRAVENOUS
Status: CANCELLED | OUTPATIENT
Start: 2022-08-16

## 2022-08-15 RX ADMIN — DEXTROSE MONOHYDRATE: 50 INJECTION, SOLUTION INTRAVENOUS at 09:08

## 2022-08-15 RX ADMIN — AMIKACIN SULFATE 1875 MG: 250 INJECTION, SOLUTION INTRAMUSCULAR; INTRAVENOUS at 09:08

## 2022-08-16 LAB — AMIKACIN TROUGH SERPL-MCNC: <0.8 MCG/ML

## 2022-08-17 ENCOUNTER — INFUSION (OUTPATIENT)
Dept: INFUSION THERAPY | Facility: HOSPITAL | Age: 67
End: 2022-08-17
Attending: GENERAL PRACTICE
Payer: MEDICARE

## 2022-08-17 VITALS
DIASTOLIC BLOOD PRESSURE: 55 MMHG | TEMPERATURE: 99 F | RESPIRATION RATE: 16 BRPM | SYSTOLIC BLOOD PRESSURE: 149 MMHG | HEART RATE: 81 BPM

## 2022-08-17 DIAGNOSIS — A31.0 MAI (MYCOBACTERIUM AVIUM-INTRACELLULARE): Primary | ICD-10-CM

## 2022-08-17 PROCEDURE — 63600175 PHARM REV CODE 636 W HCPCS: Performed by: GENERAL PRACTICE

## 2022-08-17 PROCEDURE — 96365 THER/PROPH/DIAG IV INF INIT: CPT

## 2022-08-17 PROCEDURE — 25000003 PHARM REV CODE 250: Performed by: GENERAL PRACTICE

## 2022-08-17 RX ORDER — HEPARIN 100 UNIT/ML
500 SYRINGE INTRAVENOUS
Status: DISCONTINUED | OUTPATIENT
Start: 2022-08-17 | End: 2022-08-17 | Stop reason: HOSPADM

## 2022-08-17 RX ORDER — HEPARIN 100 UNIT/ML
500 SYRINGE INTRAVENOUS
Status: CANCELLED | OUTPATIENT
Start: 2022-08-19

## 2022-08-17 RX ADMIN — AMIKACIN SULFATE 1875 MG: 500 INJECTION, SOLUTION INTRAMUSCULAR; INTRAVENOUS at 09:08

## 2022-08-17 RX ADMIN — DEXTROSE MONOHYDRATE: 50 INJECTION, SOLUTION INTRAVENOUS at 09:08

## 2022-08-17 NOTE — PLAN OF CARE
Patient's trough 8/15 <1; as of 8/17 dose peak has not resulted yet, continue with same dose.    8/18/22 Contacted [facility] hospital lab to request results of peak from 8/15/22 and was informed they do not have specimen although it was logged in as received. I requested Hilda Rodriguez RN to redraw amikacin peak on 8/19/22.    8/19/22 Amikacin peak drawn 30 minutes after infusion by Hilda Rodriguez RN.     8/22/22 Patient's peak drawn and resulted as a trough at 47.1. Increase dose to 2200mg and re-draw trough and peak 8/26/22.    8/29/22 Patient's trough and peak drawn are within parameters (trough <5ug/ml and peak 65-80 ug/ml) specified by physician. Maintain dosing at 2200mg three times weekly with next lab on 9/2/22.    9/1/22 Zhanna called on behalf of Dr. Stroud who was concerned with the peak result of >70ug/ml. He requested a slight decrease in dose to 2000mg starting 9/2/22 to maintain peak goal of 60-70 ug/ml. Verified with MD patient will skip dose on 9/5/22 due to holiday and resume 9/7/22 with labs on Friday, 9/9/22.    9/14/22 Peak and trough labs drawn 9/12/22 with peak resulting at >70 ug/ml. The recommendation to Dr. Stroud was made to decrease dose to Amikacin 1800mg three times weekly beginning with 9/14/22 dose and was accepted. Labs to follow 9/21/22 9/27/22 Amikacin peak not resulted from 9/21/22 as of today; I reached out to Dr. Stroud asking if he would like peak and trough 9/28/22 and he said yes.    10/7/22 Dr. Stroud requests the Amikacin be dose reduced to 1600mg for trough >5. Labs pending.   Per lab, draw peak through a peripheral vein as to not get a contaminated sample.    10/10/22 Patient's Amikacin peak resulted from 10/5 at >70. Consulted Dr. Stroud and he requested a dose reduction. New dosing at 1400mg three times weekly with next labs to be drawn 10/19/22.

## 2022-08-18 DIAGNOSIS — A31.0 MAI (MYCOBACTERIUM AVIUM-INTRACELLULARE): Primary | ICD-10-CM

## 2022-08-19 ENCOUNTER — INFUSION (OUTPATIENT)
Dept: INFUSION THERAPY | Facility: HOSPITAL | Age: 67
End: 2022-08-19
Attending: GENERAL PRACTICE
Payer: MEDICARE

## 2022-08-19 VITALS
HEART RATE: 78 BPM | SYSTOLIC BLOOD PRESSURE: 152 MMHG | TEMPERATURE: 98 F | RESPIRATION RATE: 16 BRPM | DIASTOLIC BLOOD PRESSURE: 71 MMHG

## 2022-08-19 DIAGNOSIS — A31.0 MAI (MYCOBACTERIUM AVIUM-INTRACELLULARE): Primary | ICD-10-CM

## 2022-08-19 PROCEDURE — 96365 THER/PROPH/DIAG IV INF INIT: CPT

## 2022-08-19 PROCEDURE — 80150 ASSAY OF AMIKACIN: CPT | Performed by: GENERAL PRACTICE

## 2022-08-19 PROCEDURE — 25000003 PHARM REV CODE 250: Performed by: GENERAL PRACTICE

## 2022-08-19 PROCEDURE — 36415 COLL VENOUS BLD VENIPUNCTURE: CPT

## 2022-08-19 PROCEDURE — 63600175 PHARM REV CODE 636 W HCPCS: Performed by: GENERAL PRACTICE

## 2022-08-19 RX ORDER — HEPARIN 100 UNIT/ML
500 SYRINGE INTRAVENOUS
Status: DISCONTINUED | OUTPATIENT
Start: 2022-08-19 | End: 2022-08-19 | Stop reason: HOSPADM

## 2022-08-19 RX ORDER — HEPARIN 100 UNIT/ML
500 SYRINGE INTRAVENOUS
Status: CANCELLED | OUTPATIENT
Start: 2022-08-21

## 2022-08-19 RX ADMIN — AMIKACIN SULFATE 1875 MG: 250 INJECTION INTRAMUSCULAR; INTRAVENOUS at 09:08

## 2022-08-19 NOTE — PLAN OF CARE
Amikacin administered.  Peak was redrawn and given to Paresh in lab. (Peak was drawn Monday, logged into lab across the street and unknown event occurred and no lab results reported).  Pt aware.

## 2022-08-20 LAB — AMIKACIN PEAK SERPL-MCNC: 41.7 MCG/ML (ref 20–35)

## 2022-08-22 ENCOUNTER — INFUSION (OUTPATIENT)
Dept: INFUSION THERAPY | Facility: HOSPITAL | Age: 67
End: 2022-08-22
Attending: GENERAL PRACTICE
Payer: MEDICARE

## 2022-08-22 VITALS
TEMPERATURE: 98 F | SYSTOLIC BLOOD PRESSURE: 139 MMHG | HEART RATE: 71 BPM | RESPIRATION RATE: 17 BRPM | DIASTOLIC BLOOD PRESSURE: 71 MMHG

## 2022-08-22 DIAGNOSIS — A31.0 MAI (MYCOBACTERIUM AVIUM-INTRACELLULARE): Primary | ICD-10-CM

## 2022-08-22 PROCEDURE — 25000003 PHARM REV CODE 250: Performed by: GENERAL PRACTICE

## 2022-08-22 PROCEDURE — 96365 THER/PROPH/DIAG IV INF INIT: CPT

## 2022-08-22 PROCEDURE — 63600175 PHARM REV CODE 636 W HCPCS: Performed by: GENERAL PRACTICE

## 2022-08-22 RX ORDER — HEPARIN 100 UNIT/ML
500 SYRINGE INTRAVENOUS
Status: CANCELLED | OUTPATIENT
Start: 2022-08-23

## 2022-08-22 RX ORDER — HEPARIN 100 UNIT/ML
500 SYRINGE INTRAVENOUS
Status: DISCONTINUED | OUTPATIENT
Start: 2022-08-22 | End: 2022-08-22 | Stop reason: HOSPADM

## 2022-08-22 RX ADMIN — AMIKACIN SULFATE 2200 MG: 250 INJECTION, SOLUTION INTRAMUSCULAR; INTRAVENOUS at 09:08

## 2022-08-24 ENCOUNTER — INFUSION (OUTPATIENT)
Dept: INFUSION THERAPY | Facility: HOSPITAL | Age: 67
End: 2022-08-24
Attending: GENERAL PRACTICE
Payer: MEDICARE

## 2022-08-24 VITALS
DIASTOLIC BLOOD PRESSURE: 78 MMHG | RESPIRATION RATE: 18 BRPM | TEMPERATURE: 99 F | OXYGEN SATURATION: 96 % | WEIGHT: 207 LBS | BODY MASS INDEX: 35.53 KG/M2 | SYSTOLIC BLOOD PRESSURE: 143 MMHG | HEART RATE: 91 BPM

## 2022-08-24 DIAGNOSIS — A31.0 MAI (MYCOBACTERIUM AVIUM-INTRACELLULARE): Primary | ICD-10-CM

## 2022-08-24 PROCEDURE — 63600175 PHARM REV CODE 636 W HCPCS: Performed by: GENERAL PRACTICE

## 2022-08-24 PROCEDURE — 96365 THER/PROPH/DIAG IV INF INIT: CPT

## 2022-08-24 PROCEDURE — 25000003 PHARM REV CODE 250: Performed by: GENERAL PRACTICE

## 2022-08-24 RX ORDER — HEPARIN 100 UNIT/ML
500 SYRINGE INTRAVENOUS
Status: DISCONTINUED | OUTPATIENT
Start: 2022-08-24 | End: 2022-08-24 | Stop reason: HOSPADM

## 2022-08-24 RX ORDER — HEPARIN 100 UNIT/ML
500 SYRINGE INTRAVENOUS
Status: CANCELLED | OUTPATIENT
Start: 2022-08-26

## 2022-08-24 RX ADMIN — AMIKACIN SULFATE 2200 MG: 250 INJECTION, SOLUTION INTRAMUSCULAR; INTRAVENOUS at 09:08

## 2022-08-26 ENCOUNTER — INFUSION (OUTPATIENT)
Dept: INFUSION THERAPY | Facility: HOSPITAL | Age: 67
End: 2022-08-26
Attending: GENERAL PRACTICE
Payer: MEDICARE

## 2022-08-26 VITALS
WEIGHT: 207 LBS | HEART RATE: 67 BPM | BODY MASS INDEX: 35.34 KG/M2 | DIASTOLIC BLOOD PRESSURE: 73 MMHG | RESPIRATION RATE: 16 BRPM | SYSTOLIC BLOOD PRESSURE: 124 MMHG | HEIGHT: 64 IN | TEMPERATURE: 98 F

## 2022-08-26 DIAGNOSIS — A31.0 MAI (MYCOBACTERIUM AVIUM-INTRACELLULARE): Primary | ICD-10-CM

## 2022-08-26 PROCEDURE — 96365 THER/PROPH/DIAG IV INF INIT: CPT

## 2022-08-26 PROCEDURE — 63600175 PHARM REV CODE 636 W HCPCS: Performed by: GENERAL PRACTICE

## 2022-08-26 PROCEDURE — 36415 COLL VENOUS BLD VENIPUNCTURE: CPT

## 2022-08-26 PROCEDURE — 80150 ASSAY OF AMIKACIN: CPT | Performed by: GENERAL PRACTICE

## 2022-08-26 PROCEDURE — 25000003 PHARM REV CODE 250: Performed by: GENERAL PRACTICE

## 2022-08-26 PROCEDURE — 36592 COLLECT BLOOD FROM PICC: CPT

## 2022-08-26 RX ORDER — HEPARIN 100 UNIT/ML
500 SYRINGE INTRAVENOUS
Status: DISCONTINUED | OUTPATIENT
Start: 2022-08-26 | End: 2022-08-26 | Stop reason: HOSPADM

## 2022-08-26 RX ORDER — HEPARIN 100 UNIT/ML
500 SYRINGE INTRAVENOUS
Status: CANCELLED | OUTPATIENT
Start: 2022-08-28

## 2022-08-26 RX ADMIN — AMIKACIN SULFATE 2200 MG: 250 INJECTION, SOLUTION INTRAMUSCULAR; INTRAVENOUS at 09:08

## 2022-08-27 LAB
AMIKACIN PEAK SERPL-MCNC: >70 MCG/ML (ref 20–35)
AMIKACIN TROUGH SERPL-MCNC: <0.8 MCG/ML

## 2022-08-29 ENCOUNTER — INFUSION (OUTPATIENT)
Dept: INFUSION THERAPY | Facility: HOSPITAL | Age: 67
End: 2022-08-29
Attending: GENERAL PRACTICE
Payer: MEDICARE

## 2022-08-29 VITALS
TEMPERATURE: 99 F | OXYGEN SATURATION: 96 % | DIASTOLIC BLOOD PRESSURE: 68 MMHG | SYSTOLIC BLOOD PRESSURE: 135 MMHG | RESPIRATION RATE: 16 BRPM | HEART RATE: 79 BPM

## 2022-08-29 DIAGNOSIS — A31.0 MAI (MYCOBACTERIUM AVIUM-INTRACELLULARE): Primary | ICD-10-CM

## 2022-08-29 PROCEDURE — 96365 THER/PROPH/DIAG IV INF INIT: CPT

## 2022-08-29 PROCEDURE — 63600175 PHARM REV CODE 636 W HCPCS: Performed by: GENERAL PRACTICE

## 2022-08-29 PROCEDURE — 25000003 PHARM REV CODE 250: Performed by: GENERAL PRACTICE

## 2022-08-29 PROCEDURE — 96523 IRRIG DRUG DELIVERY DEVICE: CPT

## 2022-08-29 RX ORDER — HEPARIN 100 UNIT/ML
500 SYRINGE INTRAVENOUS
Status: DISCONTINUED | OUTPATIENT
Start: 2022-08-29 | End: 2022-08-29 | Stop reason: HOSPADM

## 2022-08-29 RX ORDER — HEPARIN 100 UNIT/ML
500 SYRINGE INTRAVENOUS
Status: CANCELLED | OUTPATIENT
Start: 2022-08-30

## 2022-08-29 RX ADMIN — AMIKACIN SULFATE 2200 MG: 250 INJECTION INTRAMUSCULAR; INTRAVENOUS at 09:08

## 2022-08-29 RX ADMIN — HEPARIN 500 UNITS: 100 SYRINGE at 11:08

## 2022-08-29 NOTE — PLAN OF CARE
Pt tolerated infusion well. Next appt reviewed. Pt denied questions or further needs at the time of discharge.

## 2022-08-30 ENCOUNTER — HOSPITAL ENCOUNTER (OUTPATIENT)
Dept: RADIOLOGY | Facility: HOSPITAL | Age: 67
Discharge: HOME | End: 2022-08-30
Attending: GENERAL PRACTICE
Payer: MEDICARE

## 2022-08-30 DIAGNOSIS — A31.0 MAI (MYCOBACTERIUM AVIUM-INTRACELLULARE): ICD-10-CM

## 2022-08-30 PROCEDURE — 71250 CT THORAX DX C-: CPT | Mod: TC

## 2022-08-31 ENCOUNTER — INFUSION (OUTPATIENT)
Dept: INFUSION THERAPY | Facility: HOSPITAL | Age: 67
End: 2022-08-31
Attending: GENERAL PRACTICE
Payer: MEDICARE

## 2022-08-31 VITALS
TEMPERATURE: 99 F | OXYGEN SATURATION: 95 % | SYSTOLIC BLOOD PRESSURE: 157 MMHG | HEART RATE: 83 BPM | DIASTOLIC BLOOD PRESSURE: 71 MMHG

## 2022-08-31 DIAGNOSIS — A31.0 MAI (MYCOBACTERIUM AVIUM-INTRACELLULARE): Primary | ICD-10-CM

## 2022-08-31 PROCEDURE — 63600175 PHARM REV CODE 636 W HCPCS: Performed by: GENERAL PRACTICE

## 2022-08-31 PROCEDURE — 96365 THER/PROPH/DIAG IV INF INIT: CPT

## 2022-08-31 PROCEDURE — 25000003 PHARM REV CODE 250: Performed by: GENERAL PRACTICE

## 2022-08-31 RX ORDER — HEPARIN 100 UNIT/ML
500 SYRINGE INTRAVENOUS
Status: DISCONTINUED | OUTPATIENT
Start: 2022-08-31 | End: 2022-08-31 | Stop reason: HOSPADM

## 2022-08-31 RX ORDER — HEPARIN 100 UNIT/ML
500 SYRINGE INTRAVENOUS
Status: CANCELLED | OUTPATIENT
Start: 2022-09-02

## 2022-08-31 RX ADMIN — AMIKACIN SULFATE 2200 MG: 250 INJECTION, SOLUTION INTRAMUSCULAR; INTRAVENOUS at 09:08

## 2022-09-01 DIAGNOSIS — A31.0 MAI (MYCOBACTERIUM AVIUM-INTRACELLULARE): Primary | ICD-10-CM

## 2022-09-02 ENCOUNTER — INFUSION (OUTPATIENT)
Dept: INFUSION THERAPY | Facility: HOSPITAL | Age: 67
End: 2022-09-02
Attending: GENERAL PRACTICE
Payer: MEDICARE

## 2022-09-02 VITALS
DIASTOLIC BLOOD PRESSURE: 83 MMHG | SYSTOLIC BLOOD PRESSURE: 169 MMHG | HEIGHT: 64 IN | TEMPERATURE: 98 F | HEART RATE: 81 BPM | WEIGHT: 207 LBS | BODY MASS INDEX: 35.34 KG/M2

## 2022-09-02 DIAGNOSIS — A31.0 MAI (MYCOBACTERIUM AVIUM-INTRACELLULARE): Primary | ICD-10-CM

## 2022-09-02 LAB
ALBUMIN SERPL-MCNC: 4 GM/DL (ref 3.4–4.8)
ALBUMIN/GLOB SERPL: 1.3 RATIO (ref 1.1–2)
ALP SERPL-CCNC: 38 UNIT/L (ref 40–150)
ALT SERPL-CCNC: 10 UNIT/L (ref 0–55)
AST SERPL-CCNC: 14 UNIT/L (ref 5–34)
BASOPHILS # BLD AUTO: 0.02 X10(3)/MCL (ref 0–0.2)
BASOPHILS NFR BLD AUTO: 0.5 %
BILIRUBIN DIRECT+TOT PNL SERPL-MCNC: 0.7 MG/DL
BUN SERPL-MCNC: 22.7 MG/DL (ref 9.8–20.1)
CALCIUM SERPL-MCNC: 9.8 MG/DL (ref 8.4–10.2)
CHLORIDE SERPL-SCNC: 104 MMOL/L (ref 98–107)
CO2 SERPL-SCNC: 24 MMOL/L (ref 23–31)
CREAT SERPL-MCNC: 0.93 MG/DL (ref 0.55–1.02)
EOSINOPHIL # BLD AUTO: 0.18 X10(3)/MCL (ref 0–0.9)
EOSINOPHIL NFR BLD AUTO: 4.9 %
ERYTHROCYTE [DISTWIDTH] IN BLOOD BY AUTOMATED COUNT: 13 % (ref 11.5–17)
GFR SERPLBLD CREATININE-BSD FMLA CKD-EPI: >60 MLS/MIN/1.73/M2
GLOBULIN SER-MCNC: 3.1 GM/DL (ref 2.4–3.5)
GLUCOSE SERPL-MCNC: 167 MG/DL (ref 82–115)
HCT VFR BLD AUTO: 35 % (ref 37–47)
HGB BLD-MCNC: 11.7 GM/DL (ref 12–16)
IMM GRANULOCYTES # BLD AUTO: 0.03 X10(3)/MCL (ref 0–0.04)
IMM GRANULOCYTES NFR BLD AUTO: 0.8 %
LYMPHOCYTES # BLD AUTO: 0.64 X10(3)/MCL (ref 0.6–4.6)
LYMPHOCYTES NFR BLD AUTO: 17.3 %
MCH RBC QN AUTO: 31.5 PG (ref 27–31)
MCHC RBC AUTO-ENTMCNC: 33.4 MG/DL (ref 33–36)
MCV RBC AUTO: 94.1 FL (ref 80–94)
MONOCYTES # BLD AUTO: 0.3 X10(3)/MCL (ref 0.1–1.3)
MONOCYTES NFR BLD AUTO: 8.1 %
NEUTROPHILS # BLD AUTO: 2.5 X10(3)/MCL (ref 2.1–9.2)
NEUTROPHILS NFR BLD AUTO: 68.4 %
NRBC BLD AUTO-RTO: 0 %
PLATELET # BLD AUTO: 275 X10(3)/MCL (ref 130–400)
PMV BLD AUTO: 10.5 FL (ref 7.4–10.4)
POTASSIUM SERPL-SCNC: 3.7 MMOL/L (ref 3.5–5.1)
PROT SERPL-MCNC: 7.1 GM/DL (ref 5.8–7.6)
RBC # BLD AUTO: 3.72 X10(6)/MCL (ref 4.2–5.4)
SODIUM SERPL-SCNC: 139 MMOL/L (ref 136–145)
WBC # SPEC AUTO: 3.7 X10(3)/MCL (ref 4.5–11.5)

## 2022-09-02 PROCEDURE — 96365 THER/PROPH/DIAG IV INF INIT: CPT

## 2022-09-02 PROCEDURE — 80053 COMPREHEN METABOLIC PANEL: CPT

## 2022-09-02 PROCEDURE — 85025 COMPLETE CBC W/AUTO DIFF WBC: CPT

## 2022-09-02 PROCEDURE — 36415 COLL VENOUS BLD VENIPUNCTURE: CPT

## 2022-09-02 PROCEDURE — 25000003 PHARM REV CODE 250: Performed by: GENERAL PRACTICE

## 2022-09-02 PROCEDURE — 63600175 PHARM REV CODE 636 W HCPCS: Performed by: GENERAL PRACTICE

## 2022-09-02 RX ORDER — HEPARIN 100 UNIT/ML
500 SYRINGE INTRAVENOUS
Status: DISCONTINUED | OUTPATIENT
Start: 2022-09-02 | End: 2022-09-02 | Stop reason: HOSPADM

## 2022-09-02 RX ORDER — HEPARIN 100 UNIT/ML
500 SYRINGE INTRAVENOUS
Status: CANCELLED | OUTPATIENT
Start: 2022-09-04

## 2022-09-02 RX ADMIN — AMIKACIN SULFATE 2000 MG: 250 INJECTION INTRAMUSCULAR; INTRAVENOUS at 09:09

## 2022-09-06 ENCOUNTER — TELEPHONE (OUTPATIENT)
Dept: INFECTIOUS DISEASES | Facility: CLINIC | Age: 67
End: 2022-09-06

## 2022-09-07 ENCOUNTER — INFUSION (OUTPATIENT)
Dept: INFUSION THERAPY | Facility: HOSPITAL | Age: 67
End: 2022-09-07
Attending: GENERAL PRACTICE
Payer: MEDICARE

## 2022-09-07 VITALS — HEART RATE: 81 BPM | TEMPERATURE: 98 F | DIASTOLIC BLOOD PRESSURE: 65 MMHG | SYSTOLIC BLOOD PRESSURE: 147 MMHG

## 2022-09-07 DIAGNOSIS — A31.0 MAI (MYCOBACTERIUM AVIUM-INTRACELLULARE): Primary | ICD-10-CM

## 2022-09-07 PROCEDURE — 25000003 PHARM REV CODE 250: Performed by: GENERAL PRACTICE

## 2022-09-07 PROCEDURE — 63600175 PHARM REV CODE 636 W HCPCS: Performed by: GENERAL PRACTICE

## 2022-09-07 PROCEDURE — 96365 THER/PROPH/DIAG IV INF INIT: CPT

## 2022-09-07 RX ORDER — HEPARIN 100 UNIT/ML
500 SYRINGE INTRAVENOUS
Status: CANCELLED | OUTPATIENT
Start: 2022-09-08

## 2022-09-07 RX ORDER — HEPARIN 100 UNIT/ML
500 SYRINGE INTRAVENOUS
Status: DISCONTINUED | OUTPATIENT
Start: 2022-09-07 | End: 2022-09-07 | Stop reason: HOSPADM

## 2022-09-07 RX ADMIN — AMIKACIN SULFATE 2000 MG: 250 INJECTION, SOLUTION INTRAMUSCULAR; INTRAVENOUS at 02:09

## 2022-09-08 DIAGNOSIS — A31.0 MAI (MYCOBACTERIUM AVIUM-INTRACELLULARE): Primary | ICD-10-CM

## 2022-09-09 ENCOUNTER — INFUSION (OUTPATIENT)
Dept: INFUSION THERAPY | Facility: HOSPITAL | Age: 67
End: 2022-09-09
Attending: GENERAL PRACTICE
Payer: MEDICARE

## 2022-09-09 VITALS
DIASTOLIC BLOOD PRESSURE: 74 MMHG | TEMPERATURE: 98 F | RESPIRATION RATE: 16 BRPM | SYSTOLIC BLOOD PRESSURE: 120 MMHG | OXYGEN SATURATION: 96 % | HEART RATE: 97 BPM

## 2022-09-09 DIAGNOSIS — A31.0 MAI (MYCOBACTERIUM AVIUM-INTRACELLULARE): Primary | ICD-10-CM

## 2022-09-09 PROCEDURE — 96365 THER/PROPH/DIAG IV INF INIT: CPT

## 2022-09-09 PROCEDURE — 25000003 PHARM REV CODE 250: Performed by: GENERAL PRACTICE

## 2022-09-09 PROCEDURE — 63600175 PHARM REV CODE 636 W HCPCS: Performed by: GENERAL PRACTICE

## 2022-09-09 RX ORDER — HEPARIN 100 UNIT/ML
500 SYRINGE INTRAVENOUS
Status: CANCELLED | OUTPATIENT
Start: 2022-09-11

## 2022-09-09 RX ORDER — HEPARIN 100 UNIT/ML
500 SYRINGE INTRAVENOUS
Status: DISCONTINUED | OUTPATIENT
Start: 2022-09-09 | End: 2022-09-29 | Stop reason: HOSPADM

## 2022-09-09 RX ADMIN — HEPARIN 500 UNITS: 100 SYRINGE at 10:09

## 2022-09-09 RX ADMIN — DEXTROSE: 5 SOLUTION INTRAVENOUS at 08:09

## 2022-09-09 RX ADMIN — AMIKACIN SULFATE 2000 MG: 250 INJECTION INTRAMUSCULAR; INTRAVENOUS at 09:09

## 2022-09-12 ENCOUNTER — INFUSION (OUTPATIENT)
Dept: INFUSION THERAPY | Facility: HOSPITAL | Age: 67
End: 2022-09-12
Attending: GENERAL PRACTICE
Payer: MEDICARE

## 2022-09-12 VITALS
SYSTOLIC BLOOD PRESSURE: 128 MMHG | HEIGHT: 64 IN | HEART RATE: 74 BPM | WEIGHT: 207 LBS | BODY MASS INDEX: 35.34 KG/M2 | DIASTOLIC BLOOD PRESSURE: 74 MMHG | RESPIRATION RATE: 20 BRPM

## 2022-09-12 DIAGNOSIS — A31.0 MAI (MYCOBACTERIUM AVIUM-INTRACELLULARE): Primary | ICD-10-CM

## 2022-09-12 PROCEDURE — 36415 COLL VENOUS BLD VENIPUNCTURE: CPT

## 2022-09-12 PROCEDURE — 63600175 PHARM REV CODE 636 W HCPCS: Performed by: GENERAL PRACTICE

## 2022-09-12 PROCEDURE — 25000003 PHARM REV CODE 250: Performed by: GENERAL PRACTICE

## 2022-09-12 PROCEDURE — 80150 ASSAY OF AMIKACIN: CPT

## 2022-09-12 PROCEDURE — 96365 THER/PROPH/DIAG IV INF INIT: CPT

## 2022-09-12 PROCEDURE — 80150 ASSAY OF AMIKACIN: CPT | Mod: 91

## 2022-09-12 RX ORDER — HEPARIN 100 UNIT/ML
500 SYRINGE INTRAVENOUS
Status: CANCELLED | OUTPATIENT
Start: 2022-09-13

## 2022-09-12 RX ORDER — HEPARIN 100 UNIT/ML
500 SYRINGE INTRAVENOUS
Status: DISCONTINUED | OUTPATIENT
Start: 2022-09-12 | End: 2022-09-12 | Stop reason: HOSPADM

## 2022-09-12 RX ADMIN — AMIKACIN SULFATE 2000 MG: 250 INJECTION, SOLUTION INTRAMUSCULAR; INTRAVENOUS at 09:09

## 2022-09-13 LAB
AMIKACIN PEAK SERPL-MCNC: >70 MCG/ML (ref 20–35)
AMIKACIN TROUGH SERPL-MCNC: <0.8 MCG/ML

## 2022-09-14 ENCOUNTER — INFUSION (OUTPATIENT)
Dept: INFUSION THERAPY | Facility: HOSPITAL | Age: 67
End: 2022-09-14
Attending: GENERAL PRACTICE
Payer: MEDICARE

## 2022-09-14 VITALS
WEIGHT: 207 LBS | SYSTOLIC BLOOD PRESSURE: 164 MMHG | HEIGHT: 64 IN | DIASTOLIC BLOOD PRESSURE: 73 MMHG | RESPIRATION RATE: 18 BRPM | TEMPERATURE: 98 F | HEART RATE: 74 BPM | OXYGEN SATURATION: 96 % | BODY MASS INDEX: 35.34 KG/M2

## 2022-09-14 DIAGNOSIS — A31.0 MAI (MYCOBACTERIUM AVIUM-INTRACELLULARE): Primary | ICD-10-CM

## 2022-09-14 PROCEDURE — 63600175 PHARM REV CODE 636 W HCPCS: Performed by: GENERAL PRACTICE

## 2022-09-14 PROCEDURE — 25000003 PHARM REV CODE 250: Performed by: GENERAL PRACTICE

## 2022-09-14 PROCEDURE — 96365 THER/PROPH/DIAG IV INF INIT: CPT

## 2022-09-14 RX ORDER — HEPARIN 100 UNIT/ML
500 SYRINGE INTRAVENOUS
Status: CANCELLED | OUTPATIENT
Start: 2022-09-16

## 2022-09-14 RX ORDER — HEPARIN 100 UNIT/ML
500 SYRINGE INTRAVENOUS
Status: DISCONTINUED | OUTPATIENT
Start: 2022-09-14 | End: 2022-09-14 | Stop reason: HOSPADM

## 2022-09-14 RX ADMIN — HEPARIN 500 UNITS: 100 SYRINGE at 11:09

## 2022-09-14 RX ADMIN — AMIKACIN SULFATE 1800 MG: 250 INJECTION, SOLUTION INTRAMUSCULAR; INTRAVENOUS at 10:09

## 2022-09-15 DIAGNOSIS — A31.0 MAI (MYCOBACTERIUM AVIUM-INTRACELLULARE): Primary | ICD-10-CM

## 2022-09-16 ENCOUNTER — INFUSION (OUTPATIENT)
Dept: INFUSION THERAPY | Facility: HOSPITAL | Age: 67
End: 2022-09-16
Attending: INTERNAL MEDICINE
Payer: MEDICARE

## 2022-09-16 ENCOUNTER — HISTORICAL (OUTPATIENT)
Dept: ADMINISTRATIVE | Facility: HOSPITAL | Age: 67
End: 2022-09-16
Payer: MEDICARE

## 2022-09-16 VITALS
DIASTOLIC BLOOD PRESSURE: 72 MMHG | OXYGEN SATURATION: 95 % | BODY MASS INDEX: 35.31 KG/M2 | WEIGHT: 206.81 LBS | SYSTOLIC BLOOD PRESSURE: 137 MMHG | HEART RATE: 81 BPM | TEMPERATURE: 98 F | RESPIRATION RATE: 18 BRPM | HEIGHT: 64 IN

## 2022-09-16 DIAGNOSIS — A31.0 MAI (MYCOBACTERIUM AVIUM-INTRACELLULARE): Primary | ICD-10-CM

## 2022-09-16 PROCEDURE — 63600175 PHARM REV CODE 636 W HCPCS: Performed by: GENERAL PRACTICE

## 2022-09-16 PROCEDURE — 96365 THER/PROPH/DIAG IV INF INIT: CPT

## 2022-09-16 PROCEDURE — 25000003 PHARM REV CODE 250: Performed by: GENERAL PRACTICE

## 2022-09-16 RX ORDER — HEPARIN 100 UNIT/ML
500 SYRINGE INTRAVENOUS
Status: DISCONTINUED | OUTPATIENT
Start: 2022-09-16 | End: 2022-09-16 | Stop reason: HOSPADM

## 2022-09-16 RX ORDER — HEPARIN 100 UNIT/ML
500 SYRINGE INTRAVENOUS
Status: CANCELLED | OUTPATIENT
Start: 2022-09-18

## 2022-09-16 RX ADMIN — DEXTROSE MONOHYDRATE: 50 INJECTION, SOLUTION INTRAVENOUS at 10:09

## 2022-09-16 RX ADMIN — AMIKACIN SULFATE 1800 MG: 250 INJECTION, SOLUTION INTRAMUSCULAR; INTRAVENOUS at 09:09

## 2022-09-19 ENCOUNTER — INFUSION (OUTPATIENT)
Dept: INFUSION THERAPY | Facility: HOSPITAL | Age: 67
End: 2022-09-19
Attending: GENERAL PRACTICE
Payer: MEDICARE

## 2022-09-19 VITALS
HEART RATE: 74 BPM | RESPIRATION RATE: 18 BRPM | WEIGHT: 206.81 LBS | BODY MASS INDEX: 35.31 KG/M2 | DIASTOLIC BLOOD PRESSURE: 74 MMHG | HEIGHT: 64 IN | SYSTOLIC BLOOD PRESSURE: 152 MMHG | TEMPERATURE: 98 F | OXYGEN SATURATION: 96 %

## 2022-09-19 DIAGNOSIS — A31.0 MAI (MYCOBACTERIUM AVIUM-INTRACELLULARE): Primary | ICD-10-CM

## 2022-09-19 PROCEDURE — 25000003 PHARM REV CODE 250: Performed by: GENERAL PRACTICE

## 2022-09-19 PROCEDURE — 96365 THER/PROPH/DIAG IV INF INIT: CPT

## 2022-09-19 PROCEDURE — 63600175 PHARM REV CODE 636 W HCPCS: Performed by: GENERAL PRACTICE

## 2022-09-19 RX ORDER — HEPARIN 100 UNIT/ML
500 SYRINGE INTRAVENOUS
Status: DISCONTINUED | OUTPATIENT
Start: 2022-09-19 | End: 2022-09-19 | Stop reason: HOSPADM

## 2022-09-19 RX ORDER — HEPARIN 100 UNIT/ML
500 SYRINGE INTRAVENOUS
Status: CANCELLED | OUTPATIENT
Start: 2022-09-20

## 2022-09-19 RX ADMIN — AMIKACIN SULFATE 1800 MG: 250 INJECTION, SOLUTION INTRAMUSCULAR; INTRAVENOUS at 02:09

## 2022-09-21 ENCOUNTER — INFUSION (OUTPATIENT)
Dept: INFUSION THERAPY | Facility: HOSPITAL | Age: 67
End: 2022-09-21
Attending: GENERAL PRACTICE
Payer: MEDICARE

## 2022-09-21 VITALS
RESPIRATION RATE: 20 BRPM | HEIGHT: 64 IN | TEMPERATURE: 98 F | BODY MASS INDEX: 35.31 KG/M2 | HEART RATE: 71 BPM | DIASTOLIC BLOOD PRESSURE: 71 MMHG | SYSTOLIC BLOOD PRESSURE: 153 MMHG | WEIGHT: 206.81 LBS

## 2022-09-21 DIAGNOSIS — A31.0 MAI (MYCOBACTERIUM AVIUM-INTRACELLULARE): Primary | ICD-10-CM

## 2022-09-21 PROCEDURE — 96365 THER/PROPH/DIAG IV INF INIT: CPT

## 2022-09-21 PROCEDURE — 63600175 PHARM REV CODE 636 W HCPCS: Performed by: GENERAL PRACTICE

## 2022-09-21 PROCEDURE — 25000003 PHARM REV CODE 250: Performed by: GENERAL PRACTICE

## 2022-09-21 PROCEDURE — 80150 ASSAY OF AMIKACIN: CPT

## 2022-09-21 PROCEDURE — 36415 COLL VENOUS BLD VENIPUNCTURE: CPT

## 2022-09-21 RX ORDER — HEPARIN 100 UNIT/ML
500 SYRINGE INTRAVENOUS
Status: CANCELLED | OUTPATIENT
Start: 2022-09-23

## 2022-09-21 RX ORDER — HEPARIN 100 UNIT/ML
500 SYRINGE INTRAVENOUS
Status: DISCONTINUED | OUTPATIENT
Start: 2022-09-21 | End: 2022-09-21 | Stop reason: HOSPADM

## 2022-09-21 RX ADMIN — AMIKACIN SULFATE 1800 MG: 250 INJECTION INTRAMUSCULAR; INTRAVENOUS at 03:09

## 2022-09-23 ENCOUNTER — INFUSION (OUTPATIENT)
Dept: INFUSION THERAPY | Facility: HOSPITAL | Age: 67
End: 2022-09-23
Attending: GENERAL PRACTICE
Payer: MEDICARE

## 2022-09-23 VITALS
TEMPERATURE: 99 F | OXYGEN SATURATION: 96 % | WEIGHT: 207.44 LBS | DIASTOLIC BLOOD PRESSURE: 70 MMHG | BODY MASS INDEX: 35.42 KG/M2 | HEART RATE: 73 BPM | RESPIRATION RATE: 18 BRPM | SYSTOLIC BLOOD PRESSURE: 139 MMHG

## 2022-09-23 DIAGNOSIS — A31.0 MAI (MYCOBACTERIUM AVIUM-INTRACELLULARE): Primary | ICD-10-CM

## 2022-09-23 LAB — AMIKACIN TROUGH SERPL-MCNC: 1.1 MCG/ML

## 2022-09-23 PROCEDURE — 63600175 PHARM REV CODE 636 W HCPCS: Performed by: GENERAL PRACTICE

## 2022-09-23 PROCEDURE — 96365 THER/PROPH/DIAG IV INF INIT: CPT

## 2022-09-23 PROCEDURE — 25000003 PHARM REV CODE 250: Performed by: GENERAL PRACTICE

## 2022-09-23 RX ORDER — HEPARIN 100 UNIT/ML
500 SYRINGE INTRAVENOUS
Status: CANCELLED | OUTPATIENT
Start: 2022-09-25

## 2022-09-23 RX ORDER — HEPARIN 100 UNIT/ML
500 SYRINGE INTRAVENOUS
Status: DISCONTINUED | OUTPATIENT
Start: 2022-09-23 | End: 2022-09-23 | Stop reason: HOSPADM

## 2022-09-23 RX ADMIN — AMIKACIN SULFATE 1800 MG: 250 INJECTION INTRAMUSCULAR; INTRAVENOUS at 09:09

## 2022-09-23 RX ADMIN — HEPARIN 500 UNITS: 100 SYRINGE at 10:09

## 2022-09-26 ENCOUNTER — INFUSION (OUTPATIENT)
Dept: INFUSION THERAPY | Facility: HOSPITAL | Age: 67
End: 2022-09-26
Attending: GENERAL PRACTICE
Payer: MEDICARE

## 2022-09-26 VITALS
HEART RATE: 73 BPM | BODY MASS INDEX: 35.85 KG/M2 | SYSTOLIC BLOOD PRESSURE: 145 MMHG | TEMPERATURE: 99 F | HEIGHT: 64 IN | DIASTOLIC BLOOD PRESSURE: 77 MMHG | OXYGEN SATURATION: 97 % | RESPIRATION RATE: 20 BRPM | WEIGHT: 210 LBS

## 2022-09-26 DIAGNOSIS — A31.0 MAI (MYCOBACTERIUM AVIUM-INTRACELLULARE): Primary | ICD-10-CM

## 2022-09-26 PROCEDURE — 96367 TX/PROPH/DG ADDL SEQ IV INF: CPT

## 2022-09-26 PROCEDURE — 25000003 PHARM REV CODE 250: Performed by: GENERAL PRACTICE

## 2022-09-26 PROCEDURE — 96365 THER/PROPH/DIAG IV INF INIT: CPT

## 2022-09-26 PROCEDURE — 63600175 PHARM REV CODE 636 W HCPCS: Performed by: GENERAL PRACTICE

## 2022-09-26 RX ORDER — HEPARIN 100 UNIT/ML
500 SYRINGE INTRAVENOUS
Status: DISCONTINUED | OUTPATIENT
Start: 2022-09-26 | End: 2022-09-26 | Stop reason: HOSPADM

## 2022-09-26 RX ORDER — HEPARIN 100 UNIT/ML
500 SYRINGE INTRAVENOUS
Status: CANCELLED | OUTPATIENT
Start: 2022-09-27

## 2022-09-26 RX ADMIN — AMIKACIN SULFATE 1800 MG: 250 INJECTION INTRAMUSCULAR; INTRAVENOUS at 09:09

## 2022-09-26 NOTE — PLAN OF CARE
Patient received Amikacin on today, tolerated well. Next appointment given. Discharge home in stable condition.

## 2022-09-28 ENCOUNTER — INFUSION (OUTPATIENT)
Dept: INFUSION THERAPY | Facility: HOSPITAL | Age: 67
End: 2022-09-28
Attending: GENERAL PRACTICE
Payer: MEDICARE

## 2022-09-28 VITALS
HEIGHT: 64 IN | DIASTOLIC BLOOD PRESSURE: 72 MMHG | RESPIRATION RATE: 18 BRPM | HEART RATE: 69 BPM | TEMPERATURE: 98 F | SYSTOLIC BLOOD PRESSURE: 160 MMHG | BODY MASS INDEX: 35.85 KG/M2 | WEIGHT: 210 LBS

## 2022-09-28 DIAGNOSIS — A31.0 MAI (MYCOBACTERIUM AVIUM-INTRACELLULARE): Primary | ICD-10-CM

## 2022-09-28 PROCEDURE — 80150 ASSAY OF AMIKACIN: CPT

## 2022-09-28 PROCEDURE — 25000003 PHARM REV CODE 250: Performed by: GENERAL PRACTICE

## 2022-09-28 PROCEDURE — 63600175 PHARM REV CODE 636 W HCPCS: Performed by: GENERAL PRACTICE

## 2022-09-28 PROCEDURE — 96365 THER/PROPH/DIAG IV INF INIT: CPT

## 2022-09-28 PROCEDURE — 36415 COLL VENOUS BLD VENIPUNCTURE: CPT

## 2022-09-28 PROCEDURE — 36592 COLLECT BLOOD FROM PICC: CPT

## 2022-09-28 RX ORDER — HEPARIN 100 UNIT/ML
500 SYRINGE INTRAVENOUS
Status: CANCELLED | OUTPATIENT
Start: 2022-09-30

## 2022-09-28 RX ORDER — HEPARIN 100 UNIT/ML
500 SYRINGE INTRAVENOUS
Status: DISCONTINUED | OUTPATIENT
Start: 2022-09-28 | End: 2022-09-28 | Stop reason: HOSPADM

## 2022-09-28 RX ADMIN — AMIKACIN SULFATE 1800 MG: 250 INJECTION, SOLUTION INTRAMUSCULAR; INTRAVENOUS at 09:09

## 2022-09-29 LAB — AMIKACIN TROUGH SERPL-MCNC: 2.4 MCG/ML

## 2022-09-30 ENCOUNTER — INFUSION (OUTPATIENT)
Dept: INFUSION THERAPY | Facility: HOSPITAL | Age: 67
End: 2022-09-30
Attending: GENERAL PRACTICE
Payer: MEDICARE

## 2022-09-30 VITALS
RESPIRATION RATE: 16 BRPM | DIASTOLIC BLOOD PRESSURE: 47 MMHG | HEART RATE: 74 BPM | SYSTOLIC BLOOD PRESSURE: 172 MMHG | OXYGEN SATURATION: 98 %

## 2022-09-30 DIAGNOSIS — A31.0 MAI (MYCOBACTERIUM AVIUM-INTRACELLULARE): Primary | ICD-10-CM

## 2022-09-30 PROCEDURE — 25000003 PHARM REV CODE 250: Performed by: GENERAL PRACTICE

## 2022-09-30 PROCEDURE — 63600175 PHARM REV CODE 636 W HCPCS: Performed by: GENERAL PRACTICE

## 2022-09-30 PROCEDURE — 96365 THER/PROPH/DIAG IV INF INIT: CPT

## 2022-09-30 RX ORDER — HEPARIN 100 UNIT/ML
500 SYRINGE INTRAVENOUS
Status: DISCONTINUED | OUTPATIENT
Start: 2022-09-30 | End: 2022-09-30 | Stop reason: HOSPADM

## 2022-09-30 RX ORDER — HEPARIN 100 UNIT/ML
500 SYRINGE INTRAVENOUS
Status: CANCELLED | OUTPATIENT
Start: 2022-10-02

## 2022-09-30 RX ADMIN — AMIKACIN SULFATE 1800 MG: 250 INJECTION, SOLUTION INTRAMUSCULAR; INTRAVENOUS at 09:09

## 2022-09-30 RX ADMIN — HEPARIN 500 UNITS: 100 SYRINGE at 10:09

## 2022-10-03 ENCOUNTER — INFUSION (OUTPATIENT)
Dept: INFUSION THERAPY | Facility: HOSPITAL | Age: 67
End: 2022-10-03
Attending: GENERAL PRACTICE
Payer: MEDICARE

## 2022-10-03 ENCOUNTER — TELEPHONE (OUTPATIENT)
Dept: INFECTIOUS DISEASES | Facility: CLINIC | Age: 67
End: 2022-10-03
Payer: MEDICARE

## 2022-10-03 VITALS
TEMPERATURE: 99 F | BODY MASS INDEX: 35.85 KG/M2 | RESPIRATION RATE: 16 BRPM | DIASTOLIC BLOOD PRESSURE: 73 MMHG | OXYGEN SATURATION: 95 % | SYSTOLIC BLOOD PRESSURE: 131 MMHG | WEIGHT: 210 LBS | HEIGHT: 64 IN | HEART RATE: 67 BPM

## 2022-10-03 DIAGNOSIS — A31.0 MAI (MYCOBACTERIUM AVIUM-INTRACELLULARE): Primary | ICD-10-CM

## 2022-10-03 PROCEDURE — 25000003 PHARM REV CODE 250: Performed by: GENERAL PRACTICE

## 2022-10-03 PROCEDURE — 96365 THER/PROPH/DIAG IV INF INIT: CPT

## 2022-10-03 PROCEDURE — 63600175 PHARM REV CODE 636 W HCPCS: Performed by: GENERAL PRACTICE

## 2022-10-03 PROCEDURE — 36415 COLL VENOUS BLD VENIPUNCTURE: CPT

## 2022-10-03 RX ORDER — HEPARIN 100 UNIT/ML
500 SYRINGE INTRAVENOUS
Status: DISCONTINUED | OUTPATIENT
Start: 2022-10-03 | End: 2022-10-03 | Stop reason: HOSPADM

## 2022-10-03 RX ORDER — HEPARIN 100 UNIT/ML
500 SYRINGE INTRAVENOUS
Status: CANCELLED | OUTPATIENT
Start: 2022-10-04

## 2022-10-03 RX ADMIN — AMIKACIN SULFATE 1800 MG: 250 INJECTION, SOLUTION INTRAMUSCULAR; INTRAVENOUS at 11:10

## 2022-10-03 NOTE — TELEPHONE ENCOUNTER
----- Message from Kathi Figueroa LPN sent at 10/3/2022  1:56 PM CDT -----  Regarding: RE: Message Via Fax Queue  Returned call to patient regarding c/o diarrhea from infusion center. She states, this was the first episode to this extent but she is better today and had the infusion this morning with no symptoms. Patient instructed to call if symptoms return or for any problems or concerns. Pt verbalized understanding.   ----- Message -----  From: Karen Jett MA  Sent: 10/3/2022  12:53 PM CDT  To: Kathi Figueroa LPN, #  Subject: Message Via Fax Queue                            We received a call Friday via the fax queue from [facility] stating that the patient was complaining of several episodes of diarrhea within one hour, no appetite, and a big decrease in energy.    Danielle @ [facility]-- [phone]    Thanks!

## 2022-10-05 ENCOUNTER — INFUSION (OUTPATIENT)
Dept: INFUSION THERAPY | Facility: HOSPITAL | Age: 67
End: 2022-10-05
Attending: GENERAL PRACTICE
Payer: MEDICARE

## 2022-10-05 VITALS
TEMPERATURE: 98 F | HEART RATE: 66 BPM | OXYGEN SATURATION: 95 % | SYSTOLIC BLOOD PRESSURE: 146 MMHG | DIASTOLIC BLOOD PRESSURE: 73 MMHG | RESPIRATION RATE: 16 BRPM

## 2022-10-05 DIAGNOSIS — A31.0 MAI (MYCOBACTERIUM AVIUM-INTRACELLULARE): Primary | ICD-10-CM

## 2022-10-05 PROCEDURE — 80150 ASSAY OF AMIKACIN: CPT

## 2022-10-05 PROCEDURE — 63600175 PHARM REV CODE 636 W HCPCS: Performed by: GENERAL PRACTICE

## 2022-10-05 PROCEDURE — 36415 COLL VENOUS BLD VENIPUNCTURE: CPT

## 2022-10-05 PROCEDURE — 96365 THER/PROPH/DIAG IV INF INIT: CPT

## 2022-10-05 PROCEDURE — 25000003 PHARM REV CODE 250: Performed by: GENERAL PRACTICE

## 2022-10-05 RX ORDER — HEPARIN 100 UNIT/ML
500 SYRINGE INTRAVENOUS
Status: DISCONTINUED | OUTPATIENT
Start: 2022-10-05 | End: 2022-10-05 | Stop reason: HOSPADM

## 2022-10-05 RX ORDER — HEPARIN 100 UNIT/ML
500 SYRINGE INTRAVENOUS
Status: CANCELLED | OUTPATIENT
Start: 2022-10-07

## 2022-10-05 RX ADMIN — DEXTROSE MONOHYDRATE: 50 INJECTION, SOLUTION INTRAVENOUS at 09:10

## 2022-10-05 RX ADMIN — AMIKACIN SULFATE 1800 MG: 250 INJECTION, SOLUTION INTRAMUSCULAR; INTRAVENOUS at 09:10

## 2022-10-05 NOTE — NURSING
"Paresh in lab called [facility] regarding Amikacin Peak. The last 3 samples have been canceled due to "contamination" [facility] advised Peak needed to  be drawn from venipuncture and not from picc line. Sample collected from LT AC 30min after medication completed.   "

## 2022-10-06 LAB
AMIKACIN PEAK SERPL-MCNC: >70 MCG/ML (ref 20–35)
AMIKACIN TROUGH SERPL-MCNC: 5.6 MCG/ML

## 2022-10-07 ENCOUNTER — INFUSION (OUTPATIENT)
Dept: INFUSION THERAPY | Facility: HOSPITAL | Age: 67
End: 2022-10-07
Attending: GENERAL PRACTICE
Payer: MEDICARE

## 2022-10-07 VITALS
RESPIRATION RATE: 16 BRPM | HEIGHT: 64 IN | WEIGHT: 210 LBS | TEMPERATURE: 99 F | BODY MASS INDEX: 35.85 KG/M2 | HEART RATE: 82 BPM | DIASTOLIC BLOOD PRESSURE: 79 MMHG | OXYGEN SATURATION: 96 % | SYSTOLIC BLOOD PRESSURE: 155 MMHG

## 2022-10-07 DIAGNOSIS — A31.0 MAI (MYCOBACTERIUM AVIUM-INTRACELLULARE): Primary | ICD-10-CM

## 2022-10-07 PROCEDURE — 63600175 PHARM REV CODE 636 W HCPCS: Performed by: GENERAL PRACTICE

## 2022-10-07 PROCEDURE — 96365 THER/PROPH/DIAG IV INF INIT: CPT

## 2022-10-07 PROCEDURE — 25000003 PHARM REV CODE 250: Performed by: GENERAL PRACTICE

## 2022-10-07 RX ORDER — HEPARIN 100 UNIT/ML
500 SYRINGE INTRAVENOUS
Status: CANCELLED | OUTPATIENT
Start: 2022-10-09

## 2022-10-07 RX ORDER — HEPARIN 100 UNIT/ML
500 SYRINGE INTRAVENOUS
Status: DISCONTINUED | OUTPATIENT
Start: 2022-10-07 | End: 2022-10-07 | Stop reason: HOSPADM

## 2022-10-07 RX ADMIN — AMIKACIN SULFATE 1600 MG: 250 INJECTION INTRAMUSCULAR; INTRAVENOUS at 09:10

## 2022-10-07 RX ADMIN — HEPARIN 500 UNITS: 100 SYRINGE at 10:10

## 2022-10-07 NOTE — PLAN OF CARE
Amikacin given,  tolerated well.  She will return on 10-.  She is aware of plan of care.  Discharged to home

## 2022-10-10 ENCOUNTER — INFUSION (OUTPATIENT)
Dept: INFUSION THERAPY | Facility: HOSPITAL | Age: 67
End: 2022-10-10
Attending: GENERAL PRACTICE
Payer: MEDICARE

## 2022-10-10 VITALS
HEIGHT: 64 IN | RESPIRATION RATE: 20 BRPM | HEART RATE: 82 BPM | SYSTOLIC BLOOD PRESSURE: 153 MMHG | DIASTOLIC BLOOD PRESSURE: 76 MMHG | WEIGHT: 210.13 LBS | TEMPERATURE: 98 F | BODY MASS INDEX: 35.87 KG/M2

## 2022-10-10 DIAGNOSIS — A31.0 MAI (MYCOBACTERIUM AVIUM-INTRACELLULARE): Primary | ICD-10-CM

## 2022-10-10 PROCEDURE — 63600175 PHARM REV CODE 636 W HCPCS: Performed by: GENERAL PRACTICE

## 2022-10-10 PROCEDURE — 96365 THER/PROPH/DIAG IV INF INIT: CPT

## 2022-10-10 PROCEDURE — 25000003 PHARM REV CODE 250: Performed by: GENERAL PRACTICE

## 2022-10-10 RX ORDER — HEPARIN 100 UNIT/ML
500 SYRINGE INTRAVENOUS
Status: CANCELLED | OUTPATIENT
Start: 2022-10-11

## 2022-10-10 RX ORDER — HEPARIN 100 UNIT/ML
500 SYRINGE INTRAVENOUS
Status: DISCONTINUED | OUTPATIENT
Start: 2022-10-10 | End: 2022-10-10 | Stop reason: HOSPADM

## 2022-10-10 RX ADMIN — DEXTROSE MONOHYDRATE: 50 INJECTION, SOLUTION INTRAVENOUS at 09:10

## 2022-10-10 RX ADMIN — HEPARIN 500 UNITS: 100 SYRINGE at 10:10

## 2022-10-10 RX ADMIN — AMIKACIN SULFATE 1400 MG: 250 INJECTION INTRAMUSCULAR; INTRAVENOUS at 09:10

## 2022-10-10 NOTE — PLAN OF CARE
Amikacin dosed per MD orders after Peak level reviewed. Patient will be provided with information and verbalize understanding of plan of care. Tolerated treatment without adverse events. Discharged in stable condition.

## 2022-10-12 ENCOUNTER — INFUSION (OUTPATIENT)
Dept: INFUSION THERAPY | Facility: HOSPITAL | Age: 67
End: 2022-10-12
Attending: GENERAL PRACTICE
Payer: MEDICARE

## 2022-10-12 VITALS
TEMPERATURE: 99 F | DIASTOLIC BLOOD PRESSURE: 64 MMHG | RESPIRATION RATE: 18 BRPM | HEART RATE: 81 BPM | SYSTOLIC BLOOD PRESSURE: 128 MMHG

## 2022-10-12 DIAGNOSIS — A31.0 MAI (MYCOBACTERIUM AVIUM-INTRACELLULARE): Primary | ICD-10-CM

## 2022-10-12 PROCEDURE — 25000003 PHARM REV CODE 250: Performed by: GENERAL PRACTICE

## 2022-10-12 PROCEDURE — 96365 THER/PROPH/DIAG IV INF INIT: CPT

## 2022-10-12 PROCEDURE — 63600175 PHARM REV CODE 636 W HCPCS: Performed by: GENERAL PRACTICE

## 2022-10-12 RX ORDER — HEPARIN 100 UNIT/ML
500 SYRINGE INTRAVENOUS
Status: CANCELLED | OUTPATIENT
Start: 2022-10-14

## 2022-10-12 RX ORDER — HEPARIN 100 UNIT/ML
500 SYRINGE INTRAVENOUS
Status: DISCONTINUED | OUTPATIENT
Start: 2022-10-12 | End: 2022-10-12 | Stop reason: HOSPADM

## 2022-10-12 RX ADMIN — AMIKACIN SULFATE 1400 MG: 250 INJECTION INTRAMUSCULAR; INTRAVENOUS at 10:10

## 2022-10-14 ENCOUNTER — INFUSION (OUTPATIENT)
Dept: INFUSION THERAPY | Facility: HOSPITAL | Age: 67
End: 2022-10-14
Attending: GENERAL PRACTICE
Payer: MEDICARE

## 2022-10-14 VITALS
BODY MASS INDEX: 35.87 KG/M2 | WEIGHT: 210.13 LBS | DIASTOLIC BLOOD PRESSURE: 74 MMHG | HEART RATE: 92 BPM | RESPIRATION RATE: 18 BRPM | SYSTOLIC BLOOD PRESSURE: 148 MMHG | TEMPERATURE: 98 F | OXYGEN SATURATION: 96 % | HEIGHT: 64 IN

## 2022-10-14 DIAGNOSIS — A31.0 MAI (MYCOBACTERIUM AVIUM-INTRACELLULARE): Primary | ICD-10-CM

## 2022-10-14 PROCEDURE — 25000003 PHARM REV CODE 250: Performed by: GENERAL PRACTICE

## 2022-10-14 PROCEDURE — 63600175 PHARM REV CODE 636 W HCPCS: Performed by: GENERAL PRACTICE

## 2022-10-14 PROCEDURE — 96365 THER/PROPH/DIAG IV INF INIT: CPT

## 2022-10-14 RX ORDER — HEPARIN 100 UNIT/ML
500 SYRINGE INTRAVENOUS
Status: CANCELLED | OUTPATIENT
Start: 2022-10-16

## 2022-10-14 RX ORDER — HEPARIN 100 UNIT/ML
500 SYRINGE INTRAVENOUS
Status: DISCONTINUED | OUTPATIENT
Start: 2022-10-14 | End: 2022-10-14 | Stop reason: HOSPADM

## 2022-10-14 RX ADMIN — AMIKACIN SULFATE 1400 MG: 250 INJECTION, SOLUTION INTRAMUSCULAR; INTRAVENOUS at 09:10

## 2022-10-17 ENCOUNTER — INFUSION (OUTPATIENT)
Dept: INFUSION THERAPY | Facility: HOSPITAL | Age: 67
End: 2022-10-17
Attending: GENERAL PRACTICE
Payer: MEDICARE

## 2022-10-17 VITALS
RESPIRATION RATE: 18 BRPM | DIASTOLIC BLOOD PRESSURE: 67 MMHG | OXYGEN SATURATION: 95 % | TEMPERATURE: 98 F | SYSTOLIC BLOOD PRESSURE: 138 MMHG | HEART RATE: 76 BPM

## 2022-10-17 DIAGNOSIS — A31.0 MAI (MYCOBACTERIUM AVIUM-INTRACELLULARE): Primary | ICD-10-CM

## 2022-10-17 PROCEDURE — 96365 THER/PROPH/DIAG IV INF INIT: CPT

## 2022-10-17 PROCEDURE — 63600175 PHARM REV CODE 636 W HCPCS: Performed by: GENERAL PRACTICE

## 2022-10-17 PROCEDURE — 25000003 PHARM REV CODE 250: Performed by: GENERAL PRACTICE

## 2022-10-17 RX ORDER — HEPARIN 100 UNIT/ML
500 SYRINGE INTRAVENOUS
Status: DISCONTINUED | OUTPATIENT
Start: 2022-10-17 | End: 2022-10-17 | Stop reason: HOSPADM

## 2022-10-17 RX ORDER — HEPARIN 100 UNIT/ML
500 SYRINGE INTRAVENOUS
Status: CANCELLED | OUTPATIENT
Start: 2022-10-18

## 2022-10-17 RX ADMIN — AMIKACIN SULFATE 1400 MG: 250 INJECTION INTRAMUSCULAR; INTRAVENOUS at 11:10

## 2022-10-19 ENCOUNTER — INFUSION (OUTPATIENT)
Dept: INFUSION THERAPY | Facility: HOSPITAL | Age: 67
End: 2022-10-19
Attending: INTERNAL MEDICINE
Payer: MEDICARE

## 2022-10-19 VITALS
DIASTOLIC BLOOD PRESSURE: 78 MMHG | OXYGEN SATURATION: 96 % | TEMPERATURE: 98 F | HEART RATE: 84 BPM | RESPIRATION RATE: 18 BRPM | SYSTOLIC BLOOD PRESSURE: 154 MMHG

## 2022-10-19 DIAGNOSIS — A31.0 MAI (MYCOBACTERIUM AVIUM-INTRACELLULARE): Primary | ICD-10-CM

## 2022-10-19 PROCEDURE — 96523 IRRIG DRUG DELIVERY DEVICE: CPT

## 2022-10-19 RX ORDER — HEPARIN 100 UNIT/ML
500 SYRINGE INTRAVENOUS
Status: DISCONTINUED | OUTPATIENT
Start: 2022-10-19 | End: 2022-10-19 | Stop reason: HOSPADM

## 2022-10-19 RX ORDER — HEPARIN 100 UNIT/ML
500 SYRINGE INTRAVENOUS
Status: CANCELLED | OUTPATIENT
Start: 2022-10-21

## 2022-10-20 ENCOUNTER — TELEPHONE (OUTPATIENT)
Dept: INFECTIOUS DISEASES | Facility: CLINIC | Age: 67
End: 2022-10-20
Payer: MEDICARE

## 2022-10-20 ENCOUNTER — TELEPHONE (OUTPATIENT)
Dept: INFECTIOUS DISEASES | Facility: HOSPITAL | Age: 67
End: 2022-10-20
Payer: MEDICARE

## 2022-10-20 NOTE — TELEPHONE ENCOUNTER
Patient called in and asked who is going to manage her picc line while infusion on hold or will it be dc'd? Advised patient I would check with Anny<NP &get back with her.   New appt given on 11/14 at 0940.

## 2022-10-20 NOTE — TELEPHONE ENCOUNTER
Notified by MINDY Chao [facility], re: concern for ototoxicity.  Spoke with patient - states chronic tinnitus and decreased hearing has worsened over the past 1-2 weeks.  Otherwise, no other issues.  Amikacin dose yesterday held per order.  Will keep off amikacin for now at least until f/u appt with us.  Rescheduling appt to the week of November 14th.  ENT / audiology referrals in progress.  Discussed with patient over phone.

## 2022-10-20 NOTE — TELEPHONE ENCOUNTER
Called Daniel Rn at infusion center regarding picc care for patient since infusion on hold. He states, patient can come in once a week, every Wednesday at 0900 for picc care. Daniel states, they will continue site care until picc line dc'd.    Called patient back and informed of above, she verbaloized understanding and stated she will report Wednesday at 0900 for picc care.

## 2022-10-20 NOTE — TELEPHONE ENCOUNTER
Called to give new appt date and time, no answer, no voice mail. Will try again later  New appt 11/14 5895.

## 2022-10-20 NOTE — TELEPHONE ENCOUNTER
Zhanna with pharmacy called regarding patient referral to  ENT /audiology. Advised I would contact patient to send referral.     Called pt to ask if she has seen ENT in the past she stated she has seen audiology at [facility] & ok to reschedule with them. Advised patient I will call them to set up appt.    Called [facility] ENT clinic spoke with Ginny, ENT fax to send referral is [phone], she transferred me to audiology. Left message on their voice mail.    [phone]

## 2022-10-20 NOTE — TELEPHONE ENCOUNTER
Called Lorie at [facility] audiology for referral advised patient has seen them in the past. She requested referral order in [facility], informed her md not here to place order but have a note with order that is in epic and I will fax.   Fax [phone].     She took patient's contact info and will call to schedule her appt at next available. Asked when her next visit was advised next month but patient was receiving infusions 3 x week and now on hold so would like audio eval asap.   She voiced understanding. Will schedule asap.

## 2022-10-25 DIAGNOSIS — H93.8X9 OTOTOXICITY, UNSPECIFIED LATERALITY: Primary | ICD-10-CM

## 2022-10-25 DIAGNOSIS — H83.3X9: ICD-10-CM

## 2022-10-26 ENCOUNTER — INFUSION (OUTPATIENT)
Dept: INFUSION THERAPY | Facility: HOSPITAL | Age: 67
End: 2022-10-26
Attending: GENERAL PRACTICE
Payer: MEDICARE

## 2022-10-26 VITALS
DIASTOLIC BLOOD PRESSURE: 80 MMHG | OXYGEN SATURATION: 98 % | HEART RATE: 76 BPM | SYSTOLIC BLOOD PRESSURE: 160 MMHG | RESPIRATION RATE: 18 BRPM

## 2022-10-26 PROCEDURE — 96523 IRRIG DRUG DELIVERY DEVICE: CPT

## 2022-10-31 ENCOUNTER — TELEPHONE (OUTPATIENT)
Dept: AUDIOLOGY | Facility: HOSPITAL | Age: 67
End: 2022-10-31
Payer: MEDICARE

## 2022-10-31 NOTE — TELEPHONE ENCOUNTER
Attempted to schedule patient for a hearing test, but she stated she is having this done elsewhere.

## 2022-11-02 ENCOUNTER — INFUSION (OUTPATIENT)
Dept: INFUSION THERAPY | Facility: HOSPITAL | Age: 67
End: 2022-11-02
Attending: GENERAL PRACTICE
Payer: MEDICARE

## 2022-11-02 VITALS
HEART RATE: 69 BPM | RESPIRATION RATE: 18 BRPM | OXYGEN SATURATION: 97 % | SYSTOLIC BLOOD PRESSURE: 136 MMHG | HEIGHT: 64 IN | DIASTOLIC BLOOD PRESSURE: 58 MMHG | TEMPERATURE: 98 F | WEIGHT: 203 LBS | BODY MASS INDEX: 34.66 KG/M2

## 2022-11-02 PROCEDURE — 99211 OFF/OP EST MAY X REQ PHY/QHP: CPT

## 2022-11-09 ENCOUNTER — INFUSION (OUTPATIENT)
Dept: INFUSION THERAPY | Facility: HOSPITAL | Age: 67
End: 2022-11-09
Attending: GENERAL PRACTICE
Payer: MEDICARE

## 2022-11-09 VITALS
TEMPERATURE: 98 F | DIASTOLIC BLOOD PRESSURE: 52 MMHG | HEIGHT: 64 IN | HEART RATE: 76 BPM | WEIGHT: 203 LBS | SYSTOLIC BLOOD PRESSURE: 162 MMHG | BODY MASS INDEX: 34.66 KG/M2 | RESPIRATION RATE: 20 BRPM

## 2022-11-09 PROCEDURE — 96523 IRRIG DRUG DELIVERY DEVICE: CPT

## 2022-11-15 ENCOUNTER — TELEPHONE (OUTPATIENT)
Dept: INFECTIOUS DISEASES | Facility: CLINIC | Age: 67
End: 2022-11-15
Payer: MEDICARE

## 2022-11-15 NOTE — TELEPHONE ENCOUNTER
Faxed audiology report from 08/03 to Vilma at Dr Warner's office at  per Patient's request,   Confirmation received-success

## 2022-11-16 ENCOUNTER — TELEPHONE (OUTPATIENT)
Dept: INFECTIOUS DISEASES | Facility: CLINIC | Age: 67
End: 2022-11-16

## 2022-11-16 ENCOUNTER — OFFICE VISIT (OUTPATIENT)
Dept: INFECTIOUS DISEASES | Facility: CLINIC | Age: 67
End: 2022-11-16
Payer: MEDICARE

## 2022-11-16 VITALS
DIASTOLIC BLOOD PRESSURE: 63 MMHG | WEIGHT: 204.56 LBS | HEIGHT: 64 IN | HEART RATE: 76 BPM | RESPIRATION RATE: 18 BRPM | TEMPERATURE: 98 F | OXYGEN SATURATION: 98 % | BODY MASS INDEX: 34.92 KG/M2 | SYSTOLIC BLOOD PRESSURE: 141 MMHG

## 2022-11-16 DIAGNOSIS — A31.0 MYCOBACTERIUM AVIUM-INTRACELLULARE COMPLEX: Primary | ICD-10-CM

## 2022-11-16 DIAGNOSIS — N17.9 AKI (ACUTE KIDNEY INJURY): ICD-10-CM

## 2022-11-16 DIAGNOSIS — H93.8X3 OTOTOXICITY OF BOTH EARS: ICD-10-CM

## 2022-11-16 DIAGNOSIS — A31.0 PULMONARY MYCOBACTERIUM AVIUM COMPLEX (MAC) INFECTION: ICD-10-CM

## 2022-11-16 DIAGNOSIS — R05.3 CHRONIC COUGH: ICD-10-CM

## 2022-11-16 PROCEDURE — 99215 OFFICE O/P EST HI 40 MIN: CPT | Mod: S$PBB,,, | Performed by: GENERAL PRACTICE

## 2022-11-16 PROCEDURE — 99417 PR PROLONGED SVC, OUTPT, W/WO DIRECT PT CONTACT,  EA ADDTL 15 MIN: ICD-10-PCS | Mod: S$PBB,,, | Performed by: GENERAL PRACTICE

## 2022-11-16 PROCEDURE — 99215 OFFICE O/P EST HI 40 MIN: CPT | Mod: PBBFAC | Performed by: GENERAL PRACTICE

## 2022-11-16 PROCEDURE — 99215 PR OFFICE/OUTPT VISIT, EST, LEVL V, 40-54 MIN: ICD-10-PCS | Mod: S$PBB,,, | Performed by: GENERAL PRACTICE

## 2022-11-16 PROCEDURE — 99999 PR PBB SHADOW E&M-EST. PATIENT-LVL V: CPT | Mod: PBBFAC,,, | Performed by: GENERAL PRACTICE

## 2022-11-16 PROCEDURE — 99417 PROLNG OP E/M EACH 15 MIN: CPT | Mod: S$PBB,,, | Performed by: GENERAL PRACTICE

## 2022-11-16 PROCEDURE — 99999 PR PBB SHADOW E&M-EST. PATIENT-LVL V: ICD-10-PCS | Mod: PBBFAC,,, | Performed by: GENERAL PRACTICE

## 2022-11-16 RX ORDER — KETOCONAZOLE 20 MG/G
CREAM TOPICAL NIGHTLY
COMMUNITY
Start: 2022-11-09 | End: 2023-08-24

## 2022-11-16 RX ORDER — NYSTATIN 100000 [USP'U]/G
POWDER TOPICAL
COMMUNITY
End: 2023-08-24

## 2022-11-16 RX ORDER — FLUCONAZOLE 150 MG/1
300 TABLET ORAL
COMMUNITY
Start: 2022-11-09 | End: 2023-03-06 | Stop reason: ALTCHOICE

## 2022-11-16 NOTE — TELEPHONE ENCOUNTER
Called Kathi at Dr Zayas/nephrology office to inform of new ambulatory referral and per Dr Stroud wants her to be seen by Dr Zayas. Kathi states, she will call patient to set up appointment with Dr Zayas.

## 2022-11-16 NOTE — PROGRESS NOTES
Subjective:       Patient ID: Carmen Mckinley 67 y.o.     Chief Complaint:   Chief Complaint   Patient presents with    SAE, Audio follow up        1/11/2022:  66-year-old female patient known to have past medical history of pulmonary SAE, diagnosed in January 2021 presents for follow-up.   The patient had been on 3 times weekly azithromycin, ethambutol, rifampin from January 2021 up until December 2021.  She had significant improvement in her imaging between January 2021 and April 2021.  However her sputum AFB cultures have remained positive up until most recently in September 2021.  Patient contacted our office again in December 2021 after noticing her cultures remained positive.  At that point, I had multiple discussions with the patient as documented in the chart prior to today's visit, and switched her medication regimen to daily administration of rifampin, ethambutol, azithromycin.  Most recent susceptibility testing shows isolate is still sensitive to macrolides.   She was also experiencing side effects from the rifampin intermittent dosing, and the shape of flulike symptoms.  Since switching to daily dosing of the medication, she reports that her symptoms have subsided.  She notes ongoing cough however no significant changes in its frequency and no increase in sputum production.  She denies any weight loss, no fevers.  No shortness of breath.  She also denies any abdominal pain, no diarrhea, no changes in the urine color or in the stool color.  She denies any fatigue and reports normal appetite.  Most recent blood work done 12/29/2021, showing mild hyperbilirubinemia.  Otherwise normal LFTs. [1]    03/03/2022:   Patient presents today for follow-up.  She reports her cough is minimal and does not have any shortness of breath.  No fevers, no chills, no weight loss, no night sweats.  At our last visit we had paged her regimen from intermittent dosing to daily dosing of azithromycin, rifampin, ethambutol given that  she has not cleared her sputum cultures yet despite about a year of therapy.  She reports good tolerability to the antimicrobials.    06/02/2022:  No fevers, no chills, continues to have a mild cough especially in the morning. Otherwise no weight loss and continues with activities of daily living. She is taking Rifampin, Azithromycin, Ethambutol daily at this time. Her most recent cultures collected 03/2022 remain positive after 3 months on daily regimen. Repeat CT scan stable from prior.    07/28/2022  Presents for follow up. Had COVID since last visit but only mild symptoms. Cx sputum from 06/2022 and 04/2022 remain positive for SAE despite 6 months of the daily dosing regimen. Susceptibilities from 04/2022 with no resistance to Macrolides and S to Aminoglycosides. Otherwise clinically feels well, no fevers, no chills.     11/16/2022:  Today for follow-up.  Unfortunately she has developed toxicity from her amikacin.  She reports that about 7-10 days prior to notifying us, she had started noticing some increased fatigue as well as decreased hearing acuity.  Initially did not think much of it as she was nearing the end of her therapy however these symptoms continued to worsen and she informed our office.  At that time around 10/20/2022, amikacin was discontinued.  Referral was made to audiology (after baseline audiology evaluation) and labs ordered by patient's primary care physician. Showing worsening anemia to 9.8 from 12 and ARNOL (creatinine of 1.8 from baseline 0.9). She does report some improvement since discontinuing amikacin.  Although her hearing continues to be affected, her energy is improved and her appetite is back.  She reports no concerns with urination.  And all-in-all has been doing better since discontinuing Amkacin.  She continues to have the PICC line in place.    Follow-up       Past Medical History:   Diagnosis Date    Anxiety     Arthritis     Depression     GERD (gastroesophageal reflux  "disease)     Hyperlipidemia     Hypertension         Past Surgical History:   Procedure Laterality Date    APPENDECTOMY       SECTION      ECTOPIC PREGNANCY SURGERY      FUNCTIONAL ENDOSCOPIC SINUS SURGERY (FESS) Bilateral     MANDIBLE SURGERY Bilateral     PERIPHERALLY INSERTED CENTRAL CATHETER INSERTION N/A 2022    Procedure: INSERTION, PICC;  Surgeon: Ismael Stroud MD;  Location: [facility];  Service: Infectious Disease;  Laterality: N/A;    WRIST FRACTURE SURGERY          Social History     Socioeconomic History    Marital status:    Tobacco Use    Smoking status: Former    Smokeless tobacco: Never   Substance and Sexual Activity    Alcohol use: Not Currently    Drug use: Never        Family History   Problem Relation Age of Onset    Asbestos Mother     Suicide Father         Review of patient's allergies indicates:  No Known Allergies       There is no immunization history on file for this patient.     Review of Systems   All other systems reviewed and are negative.       Objective:      BP (!) 141/63 (BP Location: Right arm)   Pulse 76   Temp 98.1 °F (36.7 °C) (Oral)   Resp 18   Ht 5' 4" (1.626 m)   Wt 92.8 kg (204 lb 9.4 oz)   SpO2 98%   BMI 35.12 kg/m²      Physical Exam  Constitutional:       Appearance: Normal appearance.   HENT:      Head: Normocephalic and atraumatic.      Mouth/Throat:      Pharynx: No oropharyngeal exudate or posterior oropharyngeal erythema.   Eyes:      Extraocular Movements: Extraocular movements intact.      Pupils: Pupils are equal, round, and reactive to light.   Cardiovascular:      Rate and Rhythm: Normal rate and regular rhythm.      Heart sounds: No murmur heard.  Pulmonary:      Effort: No respiratory distress.      Breath sounds: No wheezing, rhonchi or rales.   Abdominal:      General: Bowel sounds are normal. There is no distension.      Palpations: Abdomen is soft.      Tenderness: There is no abdominal tenderness. There is no right CVA tenderness or " left CVA tenderness.   Musculoskeletal:         General: No swelling or tenderness.      Cervical back: Neck supple. No rigidity or tenderness.   Lymphadenopathy:      Cervical: No cervical adenopathy.   Skin:     Findings: No lesion or rash.   Neurological:      General: No focal deficit present.      Mental Status: She is alert and oriented to person, place, and time. Mental status is at baseline.      Cranial Nerves: No cranial nerve deficit.      Motor: No weakness.   Psychiatric:         Mood and Affect: Mood normal.         Behavior: Behavior normal.          Assessment:       Problem List Items Addressed This Visit          ENT    Ototoxicity of both ears       Pulmonary    Chronic cough       ID    Pulmonary Mycobacterium avium complex (MAC) infection     Other Visit Diagnoses       Mycobacterium avium-intracellulare complex    -  Primary    Relevant Orders    Mycobacteria and Nocardia Culture    AFB Smear    Ambulatory referral/consult to Nephrology    ARNOL (acute kidney injury)        Relevant Orders    Mycobacteria and Nocardia Culture    AFB Smear    Ambulatory referral/consult to Nephrology               Plan:       -Sputum cultures from 04/2022 and 06/2022 remain positive for MAC despite daily dosing regimen  -completed three-month course of amikacin  -PICC line removed today, 36 cm removed   -audiology report pending, will be compared to baseline audiology evaluation  -creatinine slightly improved from 11/02/2022, today noted at 1.6   -continue with azithromycin 500 mg p.o. Q 24 hours, ethambutol 15 milligrams/kilograms p.o. Q 24 hours, rifampin 300 mg p.o. Q 24 hours   -discussed with the patient hydration and good p.o. intake, will refer to Nephrology in the meantime   -she will continue to follow-up with audiology especially if hearing aids could be helpful   -repeat a CT scan done on 07/2022 showing general improvement   -will repeat sputum culture for AFB as well in the hopes that we have achieved  clearance of her cultures which I suspect we may have as her cough and sputum production has improved  -given her recent ototoxicity and affected kidney function, will schedule a close follow-up with the patient in 1 month    Follow up in about 4 weeks (around 12/14/2022).    100 minutes of total time spent on the encounter, which includes face to face time and non-face to face time preparing to see the patient (eg, review of tests), Obtaining and/or reviewing separately obtained history, Documenting clinical information in the electronic or other health record, Independently interpreting results (not separately reported) and communicating results to the patient/family/caregiver, or Care coordination (not separately reported).

## 2022-11-17 ENCOUNTER — OFFICE VISIT (OUTPATIENT)
Dept: NEPHROLOGY | Facility: CLINIC | Age: 67
End: 2022-11-17
Payer: MEDICARE

## 2022-11-17 ENCOUNTER — TELEPHONE (OUTPATIENT)
Dept: INFECTIOUS DISEASES | Facility: CLINIC | Age: 67
End: 2022-11-17
Payer: MEDICARE

## 2022-11-17 VITALS
HEART RATE: 63 BPM | WEIGHT: 203.94 LBS | HEIGHT: 64 IN | DIASTOLIC BLOOD PRESSURE: 70 MMHG | OXYGEN SATURATION: 97 % | SYSTOLIC BLOOD PRESSURE: 148 MMHG | BODY MASS INDEX: 34.82 KG/M2 | TEMPERATURE: 98 F | RESPIRATION RATE: 20 BRPM

## 2022-11-17 DIAGNOSIS — N17.9 AKI (ACUTE KIDNEY INJURY): Primary | ICD-10-CM

## 2022-11-17 DIAGNOSIS — A31.0 MYCOBACTERIUM AVIUM-INTRACELLULARE COMPLEX: ICD-10-CM

## 2022-11-17 PROCEDURE — 99214 PR OFFICE/OUTPT VISIT, EST, LEVL IV, 30-39 MIN: ICD-10-PCS | Mod: S$PBB,,, | Performed by: INTERNAL MEDICINE

## 2022-11-17 PROCEDURE — 99999 PR PBB SHADOW E&M-EST. PATIENT-LVL V: ICD-10-PCS | Mod: PBBFAC,,, | Performed by: INTERNAL MEDICINE

## 2022-11-17 PROCEDURE — 99215 OFFICE O/P EST HI 40 MIN: CPT | Mod: PBBFAC | Performed by: INTERNAL MEDICINE

## 2022-11-17 PROCEDURE — 99214 OFFICE O/P EST MOD 30 MIN: CPT | Mod: S$PBB,,, | Performed by: INTERNAL MEDICINE

## 2022-11-17 PROCEDURE — 99999 PR PBB SHADOW E&M-EST. PATIENT-LVL V: CPT | Mod: PBBFAC,,, | Performed by: INTERNAL MEDICINE

## 2022-11-17 NOTE — TELEPHONE ENCOUNTER
Vilma Jones with Dr Warner's office called and stated, Dr Warner compared the results of audiogram and does not feel it is ototoxcity and she suggests patient completing abx therapy. Dr Price informed.

## 2022-11-17 NOTE — PROGRESS NOTES
[facility] [place] [facility] New Referral Office Note    HPI:  Carmen Mckinley 67 y.o. female with  has a past medical history of Anxiety, Arthritis, Depression, GERD (gastroesophageal reflux disease), Hyperlipidemia, and Hypertension.. Carmen Mckinley was referred to us by Dr. Stroud (ID) and presents to office as a new patient for tata s/t mycobacterium avium-intracellulare complex treatment. She states she had MAC for 4 years. According to records, last month patient began showing signs of toxicity (hearing loss, fatigue, acute kidney injury) from amikacin, which was then discontinued around 10/20/2022. She had 29 treatments of it. Baseline creatinine 0.9 with most recent creatinine up to 1.6.    She overall feels okay. No abdominal pain,n,v. No joint pains. Cough is improving.    Patient denies taking NSAIDs, new antibiotics or recreational drugs. Denies recent episode of dehydration, diarrhea, vomiting, acute illness, hospitalization, recent angiograms or exposure to IV radiocontrast.         Medical History:   Past Medical History:   Diagnosis Date    Anxiety     Arthritis     Depression     GERD (gastroesophageal reflux disease)     Hyperlipidemia     Hypertension        Surgical History:   Past Surgical History:   Procedure Laterality Date    APPENDECTOMY       SECTION      ECTOPIC PREGNANCY SURGERY      FUNCTIONAL ENDOSCOPIC SINUS SURGERY (FESS) Bilateral     MANDIBLE SURGERY Bilateral     PERIPHERALLY INSERTED CENTRAL CATHETER INSERTION N/A 2022    Procedure: INSERTION, PICC;  Surgeon: Ismael Stroud MD;  Location: [facility];  Service: Infectious Disease;  Laterality: N/A;    WRIST FRACTURE SURGERY         Family History:   Family History   Problem Relation Age of Onset    Asbestos Mother     Suicide Father    .     Social History:   Social History     Tobacco Use    Smoking status: Former    Smokeless tobacco: Never   Substance Use Topics    Alcohol use: Not Currently       Allergies:  Review of patient's allergies  indicates:  No Known Allergies    Review of Systems:  Constitutional: Denies fever, fatigue, generalized weakness, night sweats, or acute weight change  Skin: Denies wounds, no rashes, no itching, no new skin lesions  HEENT: Denies acute change in hearing or vision, tinnitus, or dysphagia, ++Decrease in hearing   Respiratory:  Denies cough, shortness of breath, or wheezing  Cardiovascular: Denies chest pain, palpitations, or swelling  Gastrointestional: Denies abdominal pain, nausea, vomiting, diarrhea, or constipation  Genitourinary: Denies dysuria, hematuria, or incontinence; reports able to empty bladder  Musculoskeletal: Denies myalgias, back pain, decreased ROM or focal weakness  Neurological: Denies headaches, seizures, dizziness, paresthesias or weakness  Hematological: Denies unusual bruising or bleeding  Psychiatric: Denies hallucinations, depression, or confusion      Medications:    Current Outpatient Medications:     azithromycin (ZITHROMAX) 500 MG tablet, Take 1 tablet (500 mg total) by mouth once daily., Disp: 90 tablet, Rfl: 1    calcium carbonate (CALCIUM 500 ORAL), Calcium 500 Take No date recorded No form recorded No frequency recorded No route recorded No set duration recorded No set duration amount recorded active No dosage strength recorded No dosage strength units of measure recorded, Disp: , Rfl:     ethambutoL (MYAMBUTOL) 400 MG Tab, Take 3 1/2 tabs daily (1400mg), Disp: 315 tablet, Rfl: 1    fenofibrate (TRICOR) 145 MG tablet, Take 145 mg by mouth., Disp: , Rfl:     fish oil-dha-epa 1,200-144-216 mg Cap, Take by mouth., Disp: , Rfl:     fluconazole (DIFLUCAN) 150 MG Tab, Take 300 mg by mouth every 7 days., Disp: , Rfl:     glucosamine-chondroitin 500-400 mg tablet, Take 1 tablet by mouth once daily., Disp: , Rfl:     hydroCHLOROthiazide (HYDRODIURIL) 12.5 MG Tab, Take 12.5 mg by mouth once daily., Disp: , Rfl:     ketoconazole (NIZORAL) 2 % cream, Apply topically every evening., Disp: , Rfl:  "    nystatin (MYCOSTATIN) powder, nystatin 100,000 unit/gram topical powder, Disp: , Rfl:     pantoprazole (PROTONIX) 40 MG tablet, Take 40 mg by mouth once daily., Disp: , Rfl:     propranoloL (INDERAL) 80 MG tablet, Take 80 mg by mouth once daily., Disp: , Rfl:     rifAMpin (RIFADIN) 300 MG capsule, Take 2 capsules (600 mg total) by mouth once daily., Disp: 180 capsule, Rfl: 1    sertraline (ZOLOFT) 100 MG tablet, Take 100 mg by mouth once daily., Disp: , Rfl:        Vitals:  BP (!) 148/70 (BP Location: Left arm, Patient Position: Sitting)   Pulse 63   Temp 98.4 °F (36.9 °C) (Temporal)   Resp 20   Ht 5' 4" (1.626 m)   Wt 92.5 kg (203 lb 14.8 oz)   SpO2 97%   BMI 35.00 kg/m²  Body mass index is 35 kg/m².    Physical Exam:  General: no acute distress, awake, alert  Eyes: PERRLA, EOMI, conjunctiva clear, eyelids without swelling  HENT: atraumatic, oropharynx and nasal mucosa patent  Neck: full ROM, no JVD, no thyromegaly or lymphadenopathy  Respiratory: equal, unlabored, clear to auscultation A/P  Cardiovascular: RRR without murmur or rub; BL radial and pedal pulses felt  Edema: none  Gastrointestinal: soft, non-tender, non-distended; positive bowel sounds; no masses to palpation  Genitourinary: no CVA tenderness upon palpation  Musculoskeletal: full ROM without limitation or discomfort  Integumentary: warm, dry; no rashes, wounds, or skin lesions  Neurological: oriented, appropriate, no acute deficits      Labs:        Component Value Date/Time     11/16/2022 1058     09/02/2022 0904    K 4.2 11/16/2022 1058    K 3.7 09/02/2022 0904    CO2 28 11/16/2022 1058    CO2 24 09/02/2022 0904    BUN 30.4 (H) 11/16/2022 1058    BUN 22.7 (H) 09/02/2022 0904    CREATININE 1.62 (H) 11/16/2022 1058    CREATININE 0.93 09/02/2022 0904    CALCIUM 10.1 11/16/2022 1058    CALCIUM 9.8 09/02/2022 0904            Component Value Date/Time    WBC 4.2 (L) 11/16/2022 1058    WBC 3.7 (L) 09/02/2022 0904    HGB 9.4 (L) " 11/16/2022 1058    HGB 11.7 (L) 09/02/2022 0904    HCT 28.3 (L) 11/16/2022 1058    HCT 35.0 (L) 09/02/2022 0904     11/16/2022 1058     09/02/2022 0904       Impression:    Patient Active Problem List   Diagnosis    Pulmonary Mycobacterium avium complex (MAC) infection    Chronic cough    Depressive disorder    Gastroesophageal reflux disease    Hypercholesterolemia    Nocardiosis    Primary hypertension    Pulmonary emphysema    SAE (mycobacterium avium-intracellulare)    Ototoxicity of both ears     ARNOL possibly related to amikacin toxicity; could also have component of rifampin AE    Urine negative for WBC and spot protein  We do not have lab work done in October so we are unable to know if renal function was actually worse and is now trending down    HTN: she does not check BP at home; reports having it checked 3 times a week at infusion center and it was at target  13:12: Repeat /70    Plan:  DC protonix  DC HCTZ    Repeat labs next week with U/S and quantitate proteinuria; if Cr worse, will speak with ID and discuss need to DC rifampin accordingly  Then repeat labs the following week and see her back in the office      Avoid NSAIDs (Aleve, Mobic, Celebrex, Ibuprofen, Advil, Toradol and Diclofenac).      MD Daquan Thacker FNP

## 2022-11-21 ENCOUNTER — LAB VISIT (OUTPATIENT)
Dept: LAB | Facility: HOSPITAL | Age: 67
End: 2022-11-21
Attending: GENERAL PRACTICE
Payer: MEDICARE

## 2022-11-21 DIAGNOSIS — A31.0 MYCOBACTERIUM AVIUM-INTRACELLULARE COMPLEX: ICD-10-CM

## 2022-11-21 DIAGNOSIS — N17.9 AKI (ACUTE KIDNEY INJURY): ICD-10-CM

## 2022-11-21 PROCEDURE — 87116 MYCOBACTERIA CULTURE: CPT

## 2022-11-22 ENCOUNTER — HOSPITAL ENCOUNTER (OUTPATIENT)
Dept: RADIOLOGY | Facility: HOSPITAL | Age: 67
Discharge: HOME | End: 2022-11-22
Attending: INTERNAL MEDICINE
Payer: MEDICARE

## 2022-11-22 DIAGNOSIS — N17.9 AKI (ACUTE KIDNEY INJURY): ICD-10-CM

## 2022-11-22 DIAGNOSIS — A31.0 MYCOBACTERIUM AVIUM-INTRACELLULARE COMPLEX: ICD-10-CM

## 2022-11-22 PROCEDURE — 76770 US EXAM ABDO BACK WALL COMP: CPT | Mod: TC

## 2022-11-28 ENCOUNTER — LAB VISIT (OUTPATIENT)
Dept: LAB | Facility: HOSPITAL | Age: 67
End: 2022-11-28
Attending: INTERNAL MEDICINE
Payer: MEDICARE

## 2022-11-28 DIAGNOSIS — N17.9 AKI (ACUTE KIDNEY INJURY): ICD-10-CM

## 2022-11-28 DIAGNOSIS — A31.0 MYCOBACTERIUM AVIUM-INTRACELLULARE COMPLEX: ICD-10-CM

## 2022-11-28 LAB
ALBUMIN SERPL-MCNC: 3.9 GM/DL (ref 3.4–4.8)
ALBUMIN/GLOB SERPL: 1.3 RATIO (ref 1.1–2)
ALP SERPL-CCNC: 23 UNIT/L (ref 40–150)
ALT SERPL-CCNC: 12 UNIT/L (ref 0–55)
APPEARANCE UR: CLEAR
AST SERPL-CCNC: 19 UNIT/L (ref 5–34)
BACTERIA #/AREA URNS AUTO: NORMAL /HPF
BASOPHILS # BLD AUTO: 0.03 X10(3)/MCL (ref 0–0.2)
BASOPHILS NFR BLD AUTO: 0.8 %
BILIRUB UR QL STRIP.AUTO: NEGATIVE MG/DL
BILIRUBIN DIRECT+TOT PNL SERPL-MCNC: 0.7 MG/DL
BUN SERPL-MCNC: 20.7 MG/DL (ref 9.8–20.1)
CALCIUM SERPL-MCNC: 9.8 MG/DL (ref 8.4–10.2)
CHLORIDE SERPL-SCNC: 105 MMOL/L (ref 98–107)
CO2 SERPL-SCNC: 23 MMOL/L (ref 23–31)
COLOR UR AUTO: NORMAL
CREAT SERPL-MCNC: 1.4 MG/DL (ref 0.55–1.02)
EOSINOPHIL # BLD AUTO: 0.17 X10(3)/MCL (ref 0–0.9)
EOSINOPHIL NFR BLD AUTO: 4.4 %
ERYTHROCYTE [DISTWIDTH] IN BLOOD BY AUTOMATED COUNT: 14.1 % (ref 11.5–17)
GFR SERPLBLD CREATININE-BSD FMLA CKD-EPI: 41 MLS/MIN/1.73/M2
GLOBULIN SER-MCNC: 2.9 GM/DL (ref 2.4–3.5)
GLUCOSE SERPL-MCNC: 80 MG/DL (ref 82–115)
GLUCOSE UR QL STRIP.AUTO: NEGATIVE MG/DL
HCT VFR BLD AUTO: 28.3 % (ref 37–47)
HGB BLD-MCNC: 9.3 GM/DL (ref 12–16)
IMM GRANULOCYTES # BLD AUTO: 0.01 X10(3)/MCL (ref 0–0.04)
IMM GRANULOCYTES NFR BLD AUTO: 0.3 %
KETONES UR QL STRIP.AUTO: NEGATIVE MG/DL
LEUKOCYTE ESTERASE UR QL STRIP.AUTO: NEGATIVE UNIT/L
LYMPHOCYTES # BLD AUTO: 0.82 X10(3)/MCL (ref 0.6–4.6)
LYMPHOCYTES NFR BLD AUTO: 21.1 %
MCH RBC QN AUTO: 31.8 PG (ref 27–31)
MCHC RBC AUTO-ENTMCNC: 32.9 MG/DL (ref 33–36)
MCV RBC AUTO: 96.9 FL (ref 80–94)
MONOCYTES # BLD AUTO: 0.53 X10(3)/MCL (ref 0.1–1.3)
MONOCYTES NFR BLD AUTO: 13.6 %
NEUTROPHILS # BLD AUTO: 2.3 X10(3)/MCL (ref 2.1–9.2)
NEUTROPHILS NFR BLD AUTO: 59.8 %
NITRITE UR QL STRIP.AUTO: NEGATIVE
NRBC BLD AUTO-RTO: 0 %
PH UR STRIP.AUTO: 5.5 [PH]
PLATELET # BLD AUTO: 293 X10(3)/MCL (ref 130–400)
PMV BLD AUTO: 10.4 FL (ref 7.4–10.4)
POTASSIUM SERPL-SCNC: 4.5 MMOL/L (ref 3.5–5.1)
PROT SERPL-MCNC: 6.8 GM/DL (ref 5.8–7.6)
PROT UR QL STRIP.AUTO: NEGATIVE MG/DL
RBC # BLD AUTO: 2.92 X10(6)/MCL (ref 4.2–5.4)
RBC #/AREA URNS AUTO: <5 /HPF
RBC UR QL AUTO: NEGATIVE UNIT/L
SODIUM SERPL-SCNC: 137 MMOL/L (ref 136–145)
SP GR UR STRIP.AUTO: 1.01 (ref 1–1.03)
SQUAMOUS #/AREA URNS AUTO: <5 /HPF
UROBILINOGEN UR STRIP-ACNC: 0.2 MG/DL
WBC # SPEC AUTO: 3.9 X10(3)/MCL (ref 4.5–11.5)
WBC #/AREA URNS AUTO: <5 /HPF

## 2022-11-28 PROCEDURE — 85025 COMPLETE CBC W/AUTO DIFF WBC: CPT

## 2022-11-28 PROCEDURE — 80053 COMPREHEN METABOLIC PANEL: CPT

## 2022-11-28 PROCEDURE — 36415 COLL VENOUS BLD VENIPUNCTURE: CPT

## 2022-11-30 ENCOUNTER — OFFICE VISIT (OUTPATIENT)
Dept: NEPHROLOGY | Facility: CLINIC | Age: 67
End: 2022-11-30
Payer: MEDICARE

## 2022-11-30 VITALS
RESPIRATION RATE: 20 BRPM | WEIGHT: 200.63 LBS | TEMPERATURE: 98 F | OXYGEN SATURATION: 95 % | SYSTOLIC BLOOD PRESSURE: 128 MMHG | BODY MASS INDEX: 34.25 KG/M2 | HEIGHT: 64 IN | DIASTOLIC BLOOD PRESSURE: 68 MMHG | HEART RATE: 67 BPM

## 2022-11-30 DIAGNOSIS — N17.9 AKI (ACUTE KIDNEY INJURY): Primary | ICD-10-CM

## 2022-11-30 PROCEDURE — 99999 PR PBB SHADOW E&M-EST. PATIENT-LVL IV: CPT | Mod: PBBFAC,,, | Performed by: INTERNAL MEDICINE

## 2022-11-30 PROCEDURE — 99213 OFFICE O/P EST LOW 20 MIN: CPT | Mod: S$PBB,,,

## 2022-11-30 PROCEDURE — 99214 OFFICE O/P EST MOD 30 MIN: CPT | Mod: PBBFAC | Performed by: INTERNAL MEDICINE

## 2022-11-30 PROCEDURE — 99999 PR PBB SHADOW E&M-EST. PATIENT-LVL IV: ICD-10-PCS | Mod: PBBFAC,,, | Performed by: INTERNAL MEDICINE

## 2022-11-30 PROCEDURE — 99213 PR OFFICE/OUTPT VISIT, EST, LEVL III, 20-29 MIN: ICD-10-PCS | Mod: S$PBB,,,

## 2022-11-30 NOTE — PROGRESS NOTES
[facility] [place] [facility] [place] Progress Note      HPI  Carmen Mckinley, 67 y.o. female,  has a past medical history of Anxiety, Arthritis, Depression, GERD (gastroesophageal reflux disease), Hyperlipidemia, and Hypertension. Carmen Mckinley presents to office for a follow up visit for  tata s/t mycobacterium avium-intracellulare complex treatment. She states she had MAC for 4 years. According to records, last month patient began showing signs of toxicity (hearing loss, fatigue, acute kidney injury) from amikacin, which was then discontinued around 10/20/2022. She had 29 treatments of it. Baseline creatinine 0.9 with highest creatinine up to 1.6. Now trending down.    Patient denies taking NSAIDs, new antibiotics or recreational drugs. Denies recent episode of dehydration, diarrhea, vomiting, acute illness, hospitalization, recent angiograms or exposure to IV radiocontrast.      Medical History:   Past Medical History:   Diagnosis Date    Anxiety     Arthritis     Depression     GERD (gastroesophageal reflux disease)     Hyperlipidemia     Hypertension        Surgical History:   Past Surgical History:   Procedure Laterality Date    APPENDECTOMY       SECTION      ECTOPIC PREGNANCY SURGERY      FUNCTIONAL ENDOSCOPIC SINUS SURGERY (FESS) Bilateral     MANDIBLE SURGERY Bilateral     PERIPHERALLY INSERTED CENTRAL CATHETER INSERTION N/A 2022    Procedure: INSERTION, PICC;  Surgeon: Ismael Stroud MD;  Location: [facility];  Service: Infectious Disease;  Laterality: N/A;    WRIST FRACTURE SURGERY         Family History:   Family History   Problem Relation Age of Onset    Asbestos Mother     Suicide Father        Social History:   Social History     Tobacco Use    Smoking status: Former    Smokeless tobacco: Never   Substance Use Topics    Alcohol use: Not Currently       Allergies:  Review of patient's allergies indicates:  No Known Allergies    Medications:    Current Outpatient Medications:     azithromycin (ZITHROMAX) 500 MG  tablet, Take 1 tablet (500 mg total) by mouth once daily., Disp: 90 tablet, Rfl: 1    calcium carbonate (CALCIUM 500 ORAL), Take 500 mg by mouth Daily., Disp: , Rfl:     ethambutoL (MYAMBUTOL) 400 MG Tab, Take 3 1/2 tabs daily (1400mg), Disp: 315 tablet, Rfl: 1    fenofibrate (TRICOR) 145 MG tablet, Take 145 mg by mouth., Disp: , Rfl:     fish oil-dha-epa 1,200-144-216 mg Cap, Take by mouth., Disp: , Rfl:     fluconazole (DIFLUCAN) 150 MG Tab, Take 300 mg by mouth every 7 days., Disp: , Rfl:     glucosamine-chondroitin 500-400 mg tablet, Take 1 tablet by mouth once daily., Disp: , Rfl:     ketoconazole (NIZORAL) 2 % cream, Apply topically every evening., Disp: , Rfl:     nystatin (MYCOSTATIN) powder, nystatin 100,000 unit/gram topical powder, Disp: , Rfl:     propranoloL (INDERAL) 80 MG tablet, Take 80 mg by mouth once daily., Disp: , Rfl:     rifAMpin (RIFADIN) 300 MG capsule, Take 2 capsules (600 mg total) by mouth once daily., Disp: 180 capsule, Rfl: 1    sertraline (ZOLOFT) 100 MG tablet, Take 100 mg by mouth once daily., Disp: , Rfl:        ROS:    Constitutional: Denies fever, fatigue, generalized weakness, night sweats, or acute weight change  Skin: Denies wounds, no rashes, no itching, no new skin lesions  HEENT: Denies acute change in hearing or vision, tinnitus, or dysphagia  Respiratory:  Denies cough, shortness of breath, or wheezing  Cardiovascular: Denies chest pain, palpitations, or swelling  Gastrointestional: Denies abdominal pain, nausea, vomiting, diarrhea, or constipation  Genitourinary: Denies dysuria, hematuria, or incontinence; reports able to empty bladder  Musculoskeletal: Denies myalgias, back pain, decreased ROM or focal weakness  Neurological: Denies headaches, seizures, dizziness, paresthesias or weakness  Hematological: Denies unusual bruising or bleeding  Psychiatric: Denies hallucinations, depression, or confusion      Vital Signs:  /68 (BP Location: Left arm, Patient Position:  "Sitting)   Pulse 67   Temp 98.4 °F (36.9 °C) (Temporal)   Resp 20   Ht 5' 4" (1.626 m)   Wt 91 kg (200 lb 9.9 oz)   SpO2 95%   BMI 34.44 kg/m²   Body mass index is 34.44 kg/m².      Physical Exam:    General: no acute distress, awake, alert  Eyes: PERRLA, EOMI, conjunctiva clear, eyelids without swelling  HENT: atraumatic, oropharynx and nasal mucosa patent  Neck: full ROM, no JVD, no thyromegaly or lymphadenopathy  Respiratory: equal, unlabored, clear to auscultation A/P  Cardiovascular: RRR without murmur or rub; radial and pedal pulses felt  Edema: none  Gastrointestinal: soft, non-tender, non-distended; positive bowel sounds; no masses to palpation  Genitourinary: no CVA tenderness upon palpation  Musculoskeletal: full ROM without limitation or discomfort  Integumentary: warm, dry; no rashes, wounds, or skin lesions  Neurological: oriented, appropriate, no acute deficits      Labs:        Component Value Date/Time     11/28/2022 1230     11/22/2022 1346    K 4.5 11/28/2022 1230    K 4.7 11/22/2022 1346    CO2 23 11/28/2022 1230    CO2 23 11/22/2022 1346    BUN 20.7 (H) 11/28/2022 1230    BUN 26.4 (H) 11/22/2022 1346    CREATININE 1.40 (H) 11/28/2022 1230    CREATININE 1.56 (H) 11/22/2022 1346    EGFRNONAA >60 07/28/2022 1309    EGFRNONAA >60 03/03/2022 1206    CALCIUM 9.8 11/28/2022 1230    CALCIUM 10.1 11/22/2022 1346            Component Value Date/Time    WBC 3.9 (L) 11/28/2022 1230    WBC 4.2 (L) 11/16/2022 1058    HGB 9.3 (L) 11/28/2022 1230    HGB 9.4 (L) 11/16/2022 1058    HCT 28.3 (L) 11/28/2022 1230    HCT 28.3 (L) 11/16/2022 1058     11/28/2022 1230     11/16/2022 1058         Impression:    Patient Active Problem List   Diagnosis    Pulmonary Mycobacterium avium complex (MAC) infection    Chronic cough    Depressive disorder    Gastroesophageal reflux disease    Hypercholesterolemia    Nocardiosis    Primary hypertension    Pulmonary emphysema    SAE (mycobacterium " avium-intracellulare)    Ototoxicity of both ears     1. ARNOL (acute kidney injury)          ARNOL possibly related to amikacin toxicity; could also have component of rifampin AE; improving     Plan:  Okay with current medication regimen  Labs in 1 month, will call her in for any renal concerns  FU in 3 months with labs 1 week before   Cont FU with ID    Avoid NSAIDs (Aleve, Mobic, Celebrex, Ibuprofen, Advil, Toradol and Diclofenac).     MD Daquan Thacker FNP

## 2022-12-15 ENCOUNTER — OFFICE VISIT (OUTPATIENT)
Dept: INFECTIOUS DISEASES | Facility: CLINIC | Age: 67
End: 2022-12-15
Payer: MEDICARE

## 2022-12-15 VITALS
DIASTOLIC BLOOD PRESSURE: 73 MMHG | RESPIRATION RATE: 18 BRPM | OXYGEN SATURATION: 97 % | BODY MASS INDEX: 33.88 KG/M2 | TEMPERATURE: 98 F | SYSTOLIC BLOOD PRESSURE: 136 MMHG | HEART RATE: 73 BPM | WEIGHT: 198.44 LBS | HEIGHT: 64 IN

## 2022-12-15 DIAGNOSIS — A31.0 PULMONARY MYCOBACTERIUM AVIUM COMPLEX (MAC) INFECTION: ICD-10-CM

## 2022-12-15 DIAGNOSIS — A31.0 MAI (MYCOBACTERIUM AVIUM-INTRACELLULARE): ICD-10-CM

## 2022-12-15 DIAGNOSIS — H93.8X3 OTOTOXICITY OF BOTH EARS: ICD-10-CM

## 2022-12-15 DIAGNOSIS — N17.9 AKI (ACUTE KIDNEY INJURY): ICD-10-CM

## 2022-12-15 DIAGNOSIS — R05.3 CHRONIC COUGH: Primary | ICD-10-CM

## 2022-12-15 DIAGNOSIS — J47.9 BRONCHIECTASIS WITHOUT COMPLICATION: ICD-10-CM

## 2022-12-15 PROCEDURE — 99214 OFFICE O/P EST MOD 30 MIN: CPT | Mod: PBBFAC | Performed by: GENERAL PRACTICE

## 2022-12-15 PROCEDURE — 99214 PR OFFICE/OUTPT VISIT, EST, LEVL IV, 30-39 MIN: ICD-10-PCS | Mod: S$PBB,,, | Performed by: GENERAL PRACTICE

## 2022-12-15 PROCEDURE — 99999 PR PBB SHADOW E&M-EST. PATIENT-LVL IV: CPT | Mod: PBBFAC,,, | Performed by: GENERAL PRACTICE

## 2022-12-15 PROCEDURE — 99999 PR PBB SHADOW E&M-EST. PATIENT-LVL IV: ICD-10-PCS | Mod: PBBFAC,,, | Performed by: GENERAL PRACTICE

## 2022-12-15 PROCEDURE — 99214 OFFICE O/P EST MOD 30 MIN: CPT | Mod: S$PBB,,, | Performed by: GENERAL PRACTICE

## 2022-12-15 RX ORDER — ACETAMINOPHEN, DIPHENHYDRAMINE HCL, PHENYLEPHRINE HCL 325; 25; 5 MG/1; MG/1; MG/1
TABLET ORAL
COMMUNITY

## 2022-12-15 NOTE — PROGRESS NOTES
Subjective:       Patient ID: Carmen Mckinley 67 y.o.     Chief Complaint:   Chief Complaint   Patient presents with    MAC    Follow-up        1/11/2022:  66-year-old female patient known to have past medical history of pulmonary SAE, diagnosed in January 2021 presents for follow-up.   The patient had been on 3 times weekly azithromycin, ethambutol, rifampin from January 2021 up until December 2021.  She had significant improvement in her imaging between January 2021 and April 2021.  However her sputum AFB cultures have remained positive up until most recently in September 2021.  Patient contacted our office again in December 2021 after noticing her cultures remained positive.  At that point, I had multiple discussions with the patient as documented in the chart prior to today's visit, and switched her medication regimen to daily administration of rifampin, ethambutol, azithromycin.  Most recent susceptibility testing shows isolate is still sensitive to macrolides.   She was also experiencing side effects from the rifampin intermittent dosing, and the shape of flulike symptoms.  Since switching to daily dosing of the medication, she reports that her symptoms have subsided.  She notes ongoing cough however no significant changes in its frequency and no increase in sputum production.  She denies any weight loss, no fevers.  No shortness of breath.  She also denies any abdominal pain, no diarrhea, no changes in the urine color or in the stool color.  She denies any fatigue and reports normal appetite.  Most recent blood work done 12/29/2021, showing mild hyperbilirubinemia.  Otherwise normal LFTs. [1]    03/03/2022:   Patient presents today for follow-up.  She reports her cough is minimal and does not have any shortness of breath.  No fevers, no chills, no weight loss, no night sweats.  At our last visit we had paged her regimen from intermittent dosing to daily dosing of azithromycin, rifampin, ethambutol given that she  has not cleared her sputum cultures yet despite about a year of therapy.  She reports good tolerability to the antimicrobials.    06/02/2022:  No fevers, no chills, continues to have a mild cough especially in the morning. Otherwise no weight loss and continues with activities of daily living. She is taking Rifampin, Azithromycin, Ethambutol daily at this time. Her most recent cultures collected 03/2022 remain positive after 3 months on daily regimen. Repeat CT scan stable from prior.    07/28/2022  Presents for follow up. Had COVID since last visit but only mild symptoms. Cx sputum from 06/2022 and 04/2022 remain positive for SAE despite 6 months of the daily dosing regimen. Susceptibilities from 04/2022 with no resistance to Macrolides and S to Aminoglycosides. Otherwise clinically feels well, no fevers, no chills.     11/16/2022:  Today for follow-up.  Unfortunately she has developed toxicity from her amikacin.  She reports that about 7-10 days prior to notifying us, she had started noticing some increased fatigue as well as decreased hearing acuity.  Initially did not think much of it as she was nearing the end of her therapy however these symptoms continued to worsen and she informed our office.  At that time around 10/20/2022, amikacin was discontinued.  Referral was made to audiology (after baseline audiology evaluation) and labs ordered by patient's primary care physician. Showing worsening anemia to 9.8 from 12 and ARNOL (creatinine of 1.8 from baseline 0.9). She does report some improvement since discontinuing amikacin.  Although her hearing continues to be affected, her energy is improved and her appetite is back.  She reports no concerns with urination.  And all-in-all has been doing better since discontinuing Amkacin.  She continues to have the PICC line in place.    12/15/2022:  Since her last visit, she reports some improvement. She has more energy and is more active. She continues to have episodes of  "diarrhea which are not constant but are significant in terms of affecting her lifestyle. She followed up with audiology who reports her hearing loss is less likely related to Aminoglycoside however, given timing of worsening of hearing loss, it is likely that it contributed to that. She followed up with nephrology and her kidney function is recovering slowly, her GFR is at 41 most recently, she is due to repeat her labs in early January and follow up with nephrology as well as our office. Her repeat cultures collected 2022 have so far remained negative per my discussion with micro lab.     Follow-up       Past Medical History:   Diagnosis Date    Anxiety     Arthritis     Depression     GERD (gastroesophageal reflux disease)     Hyperlipidemia     Hypertension         Past Surgical History:   Procedure Laterality Date    APPENDECTOMY       SECTION      ECTOPIC PREGNANCY SURGERY      FUNCTIONAL ENDOSCOPIC SINUS SURGERY (FESS) Bilateral     MANDIBLE SURGERY Bilateral     PERIPHERALLY INSERTED CENTRAL CATHETER INSERTION N/A 2022    Procedure: INSERTION, PICC;  Surgeon: Ismael Stroud MD;  Location: [facility];  Service: Infectious Disease;  Laterality: N/A;    WRIST FRACTURE SURGERY          Social History     Socioeconomic History    Marital status:    Tobacco Use    Smoking status: Former    Smokeless tobacco: Never   Substance and Sexual Activity    Alcohol use: Not Currently    Drug use: Never        Family History   Problem Relation Age of Onset    Asbestos Mother     Suicide Father         Review of patient's allergies indicates:  No Known Allergies       There is no immunization history on file for this patient.     Review of Systems   All other systems reviewed and are negative.       Objective:      /73 (BP Location: Left arm)   Pulse 73   Temp 97.9 °F (36.6 °C) (Oral)   Resp 18   Ht 5' 4" (1.626 m)   Wt 90 kg (198 lb 6.6 oz)   SpO2 97%   BMI 34.06 kg/m²      Physical " Exam  Constitutional:       Appearance: Normal appearance.   HENT:      Head: Normocephalic and atraumatic.      Mouth/Throat:      Pharynx: No oropharyngeal exudate or posterior oropharyngeal erythema.   Eyes:      Extraocular Movements: Extraocular movements intact.      Pupils: Pupils are equal, round, and reactive to light.   Cardiovascular:      Rate and Rhythm: Normal rate and regular rhythm.      Heart sounds: No murmur heard.  Pulmonary:      Effort: No respiratory distress.      Breath sounds: No wheezing, rhonchi or rales.   Abdominal:      General: Bowel sounds are normal. There is no distension.      Palpations: Abdomen is soft.      Tenderness: There is no abdominal tenderness. There is no right CVA tenderness or left CVA tenderness.   Musculoskeletal:         General: No swelling or tenderness.      Cervical back: Neck supple. No rigidity or tenderness.   Lymphadenopathy:      Cervical: No cervical adenopathy.   Skin:     Findings: No lesion or rash.   Neurological:      General: No focal deficit present.      Mental Status: She is alert and oriented to person, place, and time. Mental status is at baseline.      Cranial Nerves: No cranial nerve deficit.      Motor: No weakness.   Psychiatric:         Mood and Affect: Mood normal.         Behavior: Behavior normal.          Assessment:       Problem List Items Addressed This Visit          ENT    Ototoxicity of both ears       Pulmonary    Chronic cough - Primary    Bronchiectasis without complication       Renal/    ARNOL (acute kidney injury)       ID    Pulmonary Mycobacterium avium complex (MAC) infection    SAE (mycobacterium avium-intracellulare)            Plan:       -Sputum cultures from 04/2022 and 06/2022 remain positive for MAC despite daily dosing regimen  -completed three-month course of amikacin  -Kidney function continues to improve and hearing aids have been successful in helping her hearing loss  -continue with azithromycin 500 mg p.o.  Q 24 hours, ethambutol 15 milligrams/kilograms p.o. Q 24 hours, rifampin 300 mg p.o. Q 24 hours   -discussed with the patient hydration and good p.o. intake, she continues to follow up with nephrology as well  -repeat a CT scan done on 07/2022 showing general improvement   -Overall improved from prior visit  -Repeat cultures collected on 11/21/2022 which so far remain negative at 3 weeks of incubation, hoping to have negative cultures at this time and start planning for end of therapy     No follow-ups on file.    30 minutes of total time spent on the encounter, which includes face to face time and non-face to face time preparing to see the patient (eg, review of tests), Obtaining and/or reviewing separately obtained history, Documenting clinical information in the electronic or other health record, Independently interpreting results (not separately reported) and communicating results to the patient/family/caregiver, or Care coordination (not separately reported).

## 2023-01-04 ENCOUNTER — LAB VISIT (OUTPATIENT)
Dept: LAB | Facility: HOSPITAL | Age: 68
End: 2023-01-04
Attending: INTERNAL MEDICINE
Payer: MEDICARE

## 2023-01-04 DIAGNOSIS — N17.9 AKI (ACUTE KIDNEY INJURY): ICD-10-CM

## 2023-01-04 LAB
ALBUMIN SERPL-MCNC: 4 G/DL (ref 3.4–4.8)
ALBUMIN/GLOB SERPL: 1.3 RATIO (ref 1.1–2)
ALP SERPL-CCNC: 27 UNIT/L (ref 40–150)
ALT SERPL-CCNC: 13 UNIT/L (ref 0–55)
AST SERPL-CCNC: 19 UNIT/L (ref 5–34)
BILIRUBIN DIRECT+TOT PNL SERPL-MCNC: 0.7 MG/DL
BUN SERPL-MCNC: 19.3 MG/DL (ref 9.8–20.1)
CALCIUM SERPL-MCNC: 10 MG/DL (ref 8.4–10.2)
CHLORIDE SERPL-SCNC: 106 MMOL/L (ref 98–107)
CO2 SERPL-SCNC: 25 MMOL/L (ref 23–31)
CREAT SERPL-MCNC: 1.3 MG/DL (ref 0.55–1.02)
GFR SERPLBLD CREATININE-BSD FMLA CKD-EPI: 45 MLS/MIN/1.73/M2
GLOBULIN SER-MCNC: 3.2 GM/DL (ref 2.4–3.5)
GLUCOSE SERPL-MCNC: 88 MG/DL (ref 82–115)
POTASSIUM SERPL-SCNC: 4.4 MMOL/L (ref 3.5–5.1)
PROT SERPL-MCNC: 7.2 GM/DL (ref 5.8–7.6)
SODIUM SERPL-SCNC: 137 MMOL/L (ref 136–145)

## 2023-01-04 PROCEDURE — 80053 COMPREHEN METABOLIC PANEL: CPT

## 2023-01-04 PROCEDURE — 36415 COLL VENOUS BLD VENIPUNCTURE: CPT

## 2023-01-06 LAB — MYCOBACTERIUM SPEC QL CULT: NORMAL

## 2023-01-19 DIAGNOSIS — A31.0 PULMONARY DISEASE DUE TO MYCOBACTERIA: ICD-10-CM

## 2023-01-19 DIAGNOSIS — A31.0 PULMONARY MYCOBACTERIUM AVIUM COMPLEX (MAC) INFECTION: ICD-10-CM

## 2023-01-19 RX ORDER — ETHAMBUTOL HYDROCHLORIDE 400 MG/1
TABLET, FILM COATED ORAL
Qty: 315 TABLET | Refills: 1 | Status: SHIPPED | OUTPATIENT
Start: 2023-01-19 | End: 2023-01-26 | Stop reason: SDUPTHER

## 2023-01-19 RX ORDER — RIFAMPIN 300 MG/1
600 CAPSULE ORAL DAILY
Qty: 180 CAPSULE | Refills: 1 | Status: SHIPPED | OUTPATIENT
Start: 2023-01-19 | End: 2023-01-26 | Stop reason: SDUPTHER

## 2023-01-19 RX ORDER — AZITHROMYCIN 500 MG/1
500 TABLET, FILM COATED ORAL DAILY
Qty: 90 TABLET | Refills: 1 | Status: SHIPPED | OUTPATIENT
Start: 2023-01-19 | End: 2023-06-22 | Stop reason: SDUPTHER

## 2023-01-26 ENCOUNTER — TELEPHONE (OUTPATIENT)
Dept: INFECTIOUS DISEASES | Facility: CLINIC | Age: 68
End: 2023-01-26
Payer: MEDICARE

## 2023-01-26 DIAGNOSIS — A31.0 PULMONARY MYCOBACTERIUM AVIUM COMPLEX (MAC) INFECTION: Primary | ICD-10-CM

## 2023-01-26 DIAGNOSIS — A31.0 PULMONARY DISEASE DUE TO MYCOBACTERIA: ICD-10-CM

## 2023-01-26 RX ORDER — RIFAMPIN 300 MG/1
600 CAPSULE ORAL DAILY
Qty: 180 CAPSULE | Refills: 1 | Status: SHIPPED | OUTPATIENT
Start: 2023-01-26 | End: 2023-09-12 | Stop reason: SDUPTHER

## 2023-01-26 RX ORDER — ETHAMBUTOL HYDROCHLORIDE 400 MG/1
TABLET, FILM COATED ORAL
Qty: 315 TABLET | Refills: 1 | Status: SHIPPED | OUTPATIENT
Start: 2023-01-26 | End: 2023-09-12 | Stop reason: SDUPTHER

## 2023-01-26 NOTE — TELEPHONE ENCOUNTER
Escribed medication refills requested below to [facility] pharmacy----- slaw    Message from Lorene Josue MA sent at 1/26/2023 10:57 AM CST -----  Regarding: med refill  Pt called stating that her meds through [facility] will cost her $600. Pt is requesting that a refill for Ethambutol and Rifampin be sent to [facility] [phone] so that she can apply to Good Rx coupon and get them for $200.

## 2023-03-01 ENCOUNTER — LAB VISIT (OUTPATIENT)
Dept: LAB | Facility: HOSPITAL | Age: 68
End: 2023-03-01
Attending: INTERNAL MEDICINE
Payer: MEDICARE

## 2023-03-01 DIAGNOSIS — N17.9 AKI (ACUTE KIDNEY INJURY): ICD-10-CM

## 2023-03-01 LAB
ALBUMIN SERPL-MCNC: 4 G/DL (ref 3.4–4.8)
ALBUMIN/GLOB SERPL: 1.3 RATIO (ref 1.1–2)
ALP SERPL-CCNC: 26 UNIT/L (ref 40–150)
ALT SERPL-CCNC: 12 UNIT/L (ref 0–55)
APPEARANCE UR: CLEAR
AST SERPL-CCNC: 16 UNIT/L (ref 5–34)
BACTERIA #/AREA URNS AUTO: NORMAL /HPF
BASOPHILS # BLD AUTO: 0.04 X10(3)/MCL (ref 0–0.2)
BASOPHILS NFR BLD AUTO: 1 %
BILIRUB UR QL STRIP.AUTO: NEGATIVE MG/DL
BILIRUBIN DIRECT+TOT PNL SERPL-MCNC: 0.6 MG/DL
BUN SERPL-MCNC: 21.8 MG/DL (ref 9.8–20.1)
CALCIUM SERPL-MCNC: 9.9 MG/DL (ref 8.4–10.2)
CHLORIDE SERPL-SCNC: 105 MMOL/L (ref 98–107)
CO2 SERPL-SCNC: 26 MMOL/L (ref 23–31)
COLOR UR AUTO: NORMAL
CREAT SERPL-MCNC: 1.21 MG/DL (ref 0.55–1.02)
CREAT UR-MCNC: 23.8 MG/DL (ref 47–110)
EOSINOPHIL # BLD AUTO: 0.22 X10(3)/MCL (ref 0–0.9)
EOSINOPHIL NFR BLD AUTO: 5.2 %
ERYTHROCYTE [DISTWIDTH] IN BLOOD BY AUTOMATED COUNT: 12.4 % (ref 11.5–17)
GFR SERPLBLD CREATININE-BSD FMLA CKD-EPI: 48 MLS/MIN/1.73/M2
GLOBULIN SER-MCNC: 3.1 GM/DL (ref 2.4–3.5)
GLUCOSE SERPL-MCNC: 95 MG/DL (ref 82–115)
GLUCOSE UR QL STRIP.AUTO: NEGATIVE MG/DL
HCT VFR BLD AUTO: 32 % (ref 37–47)
HGB BLD-MCNC: 10.3 G/DL (ref 12–16)
IMM GRANULOCYTES # BLD AUTO: 0.01 X10(3)/MCL (ref 0–0.04)
IMM GRANULOCYTES NFR BLD AUTO: 0.2 %
KETONES UR QL STRIP.AUTO: NEGATIVE MG/DL
LEUKOCYTE ESTERASE UR QL STRIP.AUTO: NEGATIVE UNIT/L
LYMPHOCYTES # BLD AUTO: 0.8 X10(3)/MCL (ref 0.6–4.6)
LYMPHOCYTES NFR BLD AUTO: 19 %
MCH RBC QN AUTO: 31.1 PG
MCHC RBC AUTO-ENTMCNC: 32.2 G/DL (ref 33–36)
MCV RBC AUTO: 96.7 FL (ref 80–94)
MONOCYTES # BLD AUTO: 0.4 X10(3)/MCL (ref 0.1–1.3)
MONOCYTES NFR BLD AUTO: 9.5 %
NEUTROPHILS # BLD AUTO: 2.74 X10(3)/MCL (ref 2.1–9.2)
NEUTROPHILS NFR BLD AUTO: 65.1 %
NITRITE UR QL STRIP.AUTO: NEGATIVE
NRBC BLD AUTO-RTO: 0 %
PH UR STRIP.AUTO: 6 [PH]
PHOSPHATE SERPL-MCNC: 3.6 MG/DL (ref 2.3–4.7)
PLATELET # BLD AUTO: 301 X10(3)/MCL (ref 130–400)
PMV BLD AUTO: 10.3 FL (ref 7.4–10.4)
POTASSIUM SERPL-SCNC: 4.4 MMOL/L (ref 3.5–5.1)
PROT SERPL-MCNC: 7.1 GM/DL (ref 5.8–7.6)
PROT UR QL STRIP.AUTO: NEGATIVE MG/DL
PROT UR STRIP-MCNC: <6.8 MG/DL
RBC # BLD AUTO: 3.31 X10(6)/MCL (ref 4.2–5.4)
RBC #/AREA URNS AUTO: NORMAL /HPF
RBC UR QL AUTO: NEGATIVE UNIT/L
SODIUM SERPL-SCNC: 139 MMOL/L (ref 136–145)
SP GR UR STRIP.AUTO: 1 (ref 1–1.03)
SQUAMOUS #/AREA URNS AUTO: NORMAL /HPF
UROBILINOGEN UR STRIP-ACNC: 0.2 MG/DL
WBC # SPEC AUTO: 4.2 X10(3)/MCL (ref 4.5–11.5)
WBC #/AREA URNS AUTO: NORMAL /HPF

## 2023-03-01 PROCEDURE — 84100 ASSAY OF PHOSPHORUS: CPT

## 2023-03-01 PROCEDURE — 80053 COMPREHEN METABOLIC PANEL: CPT

## 2023-03-01 PROCEDURE — 81001 URINALYSIS AUTO W/SCOPE: CPT

## 2023-03-01 PROCEDURE — 82570 ASSAY OF URINE CREATININE: CPT

## 2023-03-01 PROCEDURE — 85025 COMPLETE CBC W/AUTO DIFF WBC: CPT

## 2023-03-01 PROCEDURE — 36415 COLL VENOUS BLD VENIPUNCTURE: CPT

## 2023-03-06 ENCOUNTER — OFFICE VISIT (OUTPATIENT)
Dept: NEPHROLOGY | Facility: CLINIC | Age: 68
End: 2023-03-06
Payer: MEDICARE

## 2023-03-06 VITALS
WEIGHT: 196.88 LBS | TEMPERATURE: 98 F | SYSTOLIC BLOOD PRESSURE: 138 MMHG | BODY MASS INDEX: 33.61 KG/M2 | HEART RATE: 71 BPM | DIASTOLIC BLOOD PRESSURE: 78 MMHG | RESPIRATION RATE: 20 BRPM | HEIGHT: 64 IN | OXYGEN SATURATION: 96 %

## 2023-03-06 DIAGNOSIS — N17.9 AKI (ACUTE KIDNEY INJURY): Primary | ICD-10-CM

## 2023-03-06 PROCEDURE — 99999 PR PBB SHADOW E&M-EST. PATIENT-LVL IV: ICD-10-PCS | Mod: PBBFAC,,, | Performed by: INTERNAL MEDICINE

## 2023-03-06 PROCEDURE — 99999 PR PBB SHADOW E&M-EST. PATIENT-LVL IV: CPT | Mod: PBBFAC,,, | Performed by: INTERNAL MEDICINE

## 2023-03-06 PROCEDURE — 99213 OFFICE O/P EST LOW 20 MIN: CPT | Mod: S$PBB,,, | Performed by: INTERNAL MEDICINE

## 2023-03-06 PROCEDURE — 99214 OFFICE O/P EST MOD 30 MIN: CPT | Mod: PBBFAC | Performed by: INTERNAL MEDICINE

## 2023-03-06 PROCEDURE — 99213 PR OFFICE/OUTPT VISIT, EST, LEVL III, 20-29 MIN: ICD-10-PCS | Mod: S$PBB,,, | Performed by: INTERNAL MEDICINE

## 2023-03-06 RX ORDER — FOLIC ACID 1 MG/1
1000 TABLET ORAL DAILY
COMMUNITY
Start: 2023-02-08

## 2023-03-06 NOTE — PROGRESS NOTES
[facility]      HPI  Carmen Mckinley, 68 y.o. female, presents to office for a follow up visit for ARNOL s/t mycobacterium avium-intracellulare complex treatment. She states she had MAC for 4 years. According to records, at one point patient began showing signs of toxicity (hearing loss, fatigue, acute kidney injury) from amikacin, which was then discontinued around 10/20/2022. She had 29 treatments of it. Baseline creatinine 0.9 with highest creatinine up to 1.7.    She has now had a clear culture     Patient denies taking NSAIDs or new antibiotics. Also denies recent episode of dehydration, diarrhea, vomiting, acute illness, hospitalization, recent angiograms or exposure to IV radiocontrast.        Medical Diagnoses:   Past Medical History:   Diagnosis Date    Anxiety     Arthritis     Depression     GERD (gastroesophageal reflux disease)     Hyperlipidemia     Hypertension      Patient Active Problem List   Diagnosis    Pulmonary Mycobacterium avium complex (MAC) infection    Chronic cough    Depressive disorder    Gastroesophageal reflux disease    Hypercholesterolemia    Nocardiosis    Primary hypertension    Pulmonary emphysema    SAE (mycobacterium avium-intracellulare)    Ototoxicity of both ears    Bronchiectasis without complication    ARNOL (acute kidney injury)       Surgical History:   Past Surgical History:   Procedure Laterality Date    APPENDECTOMY       SECTION      ECTOPIC PREGNANCY SURGERY      FUNCTIONAL ENDOSCOPIC SINUS SURGERY (FESS) Bilateral     MANDIBLE SURGERY Bilateral     PERIPHERALLY INSERTED CENTRAL CATHETER INSERTION N/A 2022    Procedure: INSERTION, PICC;  Surgeon: Ismael Stroud MD;  Location: [facility];  Service: Infectious Disease;  Laterality: N/A;    WRIST FRACTURE SURGERY         Family History:   Family History   Problem Relation Age of Onset    Asbestos Mother     Suicide Father        Social History:   Social History     Tobacco Use    Smoking  status: Former    Smokeless tobacco: Never   Substance Use Topics    Alcohol use: Not Currently       Allergies:  Review of patient's allergies indicates:  No Known Allergies    Medications:    Current Outpatient Medications:     azithromycin (ZITHROMAX) 500 MG tablet, Take 1 tablet (500 mg total) by mouth once daily., Disp: 90 tablet, Rfl: 1    calcium carbonate (CALCIUM 500 ORAL), Take 500 mg by mouth Daily., Disp: , Rfl:     ethambutoL (MYAMBUTOL) 400 MG Tab, Take 3 1/2 tabs daily (1400mg), Disp: 315 tablet, Rfl: 1    fenofibrate (TRICOR) 145 MG tablet, Take 145 mg by mouth., Disp: , Rfl:     fish oil-dha-epa 1,200-144-216 mg Cap, Take by mouth., Disp: , Rfl:     folic acid (FOLVITE) 1 MG tablet, Take 1,000 mcg by mouth once daily., Disp: , Rfl:     glucosamine-chondroitin 500-400 mg tablet, Take 1 tablet by mouth once daily., Disp: , Rfl:     melatonin 10 mg Tab, Take by mouth., Disp: , Rfl:     propranoloL (INDERAL) 80 MG tablet, Take 80 mg by mouth once daily., Disp: , Rfl:     rifAMpin (RIFADIN) 300 MG capsule, Take 2 capsules (600 mg total) by mouth once daily., Disp: 180 capsule, Rfl: 1    sertraline (ZOLOFT) 100 MG tablet, Take 100 mg by mouth once daily., Disp: , Rfl:     ketoconazole (NIZORAL) 2 % cream, Apply topically every evening., Disp: , Rfl:     nystatin (MYCOSTATIN) powder, nystatin 100,000 unit/gram topical powder, Disp: , Rfl:        Review of Systems:    Constitutional: Denies fever, fatigue, generalized weakness  Skin: Denies wounds, no rashes, no itching, no new skin lesions  Respiratory:  Denies cough, shortness of breath, or wheezing  Cardiovascular: Denies chest pain, palpitations, or swelling  Gastrointestional: Denies abdominal pain, nausea, vomiting, diarrhea, or constipation  Genitourinary: Denies dysuria, hematuria, foamy urine, or incontinence; reports able to empty bladder  Musculoskeletal: Denies back or flank pain  Neurological: Denies headaches, dizziness, paresthesias, tremors  "or focal weakness      Vital Signs:  /78 (BP Location: Left arm, Patient Position: Sitting)   Pulse 71   Temp 98.1 °F (36.7 °C) (Temporal)   Resp 20   Ht 5' 4" (1.626 m)   Wt 89.3 kg (196 lb 13.9 oz)   SpO2 96%   BMI 33.79 kg/m²   Body mass index is 33.79 kg/m².      Physical Exam:    General: no acute distress, awake, alert  Eyes: PERRLA, conjunctiva clear, eyelids without swelling  HENT: atraumatic, oropharynx and nasal mucosa patent  Neck: supple, trache midline, full ROM, no JVD, no thyromegaly or lymphadenopathy  Respiratory: equal, unlabored, clear to auscultation A/P  Cardiovascular: RRR without murmur or rub; radial and pedal pulses +2 BL  Edema: none  Gastrointestinal: soft, non-tender, non-distended; positive bowel sounds; no masses to palpation; no ascites  Genitourinary: no CVA tenderness upon palpation  Musculoskeletal: ROM without new limitation or discomfort  Integumentary: warm, dry; no rashes, wounds, or skin lesions  Neurological: oriented x4, appropriate, no acute deficits; no asterixis      Labs:        Component Value Date/Time     03/01/2023 1123     01/04/2023 1201    K 4.4 03/01/2023 1123    K 4.4 01/04/2023 1201    CO2 26 03/01/2023 1123    CO2 25 01/04/2023 1201    BUN 21.8 (H) 03/01/2023 1123    BUN 19.3 01/04/2023 1201    CREATININE 1.21 (H) 03/01/2023 1123    CREATININE 1.30 (H) 01/04/2023 1201    CREATININE 1.40 (H) 11/28/2022 1230    CREATININE 1.56 (H) 11/22/2022 1346    CALCIUM 9.9 03/01/2023 1123    CALCIUM 10.0 01/04/2023 1201    PHOS 3.6 03/01/2023 1123           Component Value Date/Time    WBC 4.2 (L) 03/01/2023 1123    WBC 3.9 (L) 11/28/2022 1230    HGB 10.3 (L) 03/01/2023 1123    HGB 9.3 (L) 11/28/2022 1230    HCT 32.0 (L) 03/01/2023 1123    HCT 28.3 (L) 11/28/2022 1230     03/01/2023 1123     11/28/2022 1230       Urine Creatinine   Date Value Ref Range Status   03/01/2023 23.8 (L) 47.0 - 110.0 mg/dL Final   11/22/2022 26.3 (L) 47.0 - " 110.0 mg/dL Final       Urine Protein Level   Date Value Ref Range Status   03/01/2023 <6.8 mg/dL Final   11/22/2022 <6.8 mg/dL Final           Imaging:  Retroperitoneal US completed on : ___      Impression:    1. ARNOL (acute kidney injury)          ARNOL secondary to aminoglycosides  Renal function continues to improve    Plan:  Labs in 3 months, will call if needed      Follow up in  6 months  with labs within the week before.    Patient to call our office with any concerns prior to next appointment.    Avoid NSAIDs (Aleve, Mobic, Celebrex, Ibuprofen, Advil, Toradol and Diclofenac).  Only take tylenol occasionally if needed for aches/pains.    Educated on low sodium diet:  Avoid high salt foods (olives, pickles, smoked meats, deli meats, salted potato chips, etc.).   Do not add salt to your food at the table.   Use only small amounts of salt when cooking.    Recommended Daily Protein Intake for chronic kidney disease:  0.8 g/kg      Jonathon Zayas

## 2023-03-20 PROBLEM — N17.9 AKI (ACUTE KIDNEY INJURY): Status: RESOLVED | Noted: 2022-12-15 | Resolved: 2023-03-20

## 2023-03-21 ENCOUNTER — OFFICE VISIT (OUTPATIENT)
Dept: INFECTIOUS DISEASES | Facility: CLINIC | Age: 68
End: 2023-03-21
Payer: MEDICARE

## 2023-03-21 VITALS
HEART RATE: 69 BPM | TEMPERATURE: 98 F | OXYGEN SATURATION: 98 % | HEIGHT: 64 IN | BODY MASS INDEX: 34.13 KG/M2 | RESPIRATION RATE: 18 BRPM | WEIGHT: 199.94 LBS | SYSTOLIC BLOOD PRESSURE: 142 MMHG | DIASTOLIC BLOOD PRESSURE: 78 MMHG

## 2023-03-21 DIAGNOSIS — A31.0 PULMONARY MYCOBACTERIUM AVIUM COMPLEX (MAC) INFECTION: ICD-10-CM

## 2023-03-21 DIAGNOSIS — J43.9 PULMONARY EMPHYSEMA, UNSPECIFIED EMPHYSEMA TYPE: ICD-10-CM

## 2023-03-21 DIAGNOSIS — J47.9 BRONCHIECTASIS WITHOUT COMPLICATION: ICD-10-CM

## 2023-03-21 DIAGNOSIS — A31.0 MAI (MYCOBACTERIUM AVIUM-INTRACELLULARE): Primary | ICD-10-CM

## 2023-03-21 DIAGNOSIS — N18.9 CHRONIC KIDNEY DISEASE, UNSPECIFIED CKD STAGE: ICD-10-CM

## 2023-03-21 DIAGNOSIS — H93.8X3 OTOTOXICITY OF BOTH EARS: ICD-10-CM

## 2023-03-21 PROCEDURE — 99214 PR OFFICE/OUTPT VISIT, EST, LEVL IV, 30-39 MIN: ICD-10-PCS | Mod: S$PBB,,, | Performed by: GENERAL PRACTICE

## 2023-03-21 PROCEDURE — 99999 PR PBB SHADOW E&M-EST. PATIENT-LVL V: ICD-10-PCS | Mod: PBBFAC,,, | Performed by: GENERAL PRACTICE

## 2023-03-21 PROCEDURE — 99215 OFFICE O/P EST HI 40 MIN: CPT | Mod: PBBFAC | Performed by: GENERAL PRACTICE

## 2023-03-21 PROCEDURE — 99214 OFFICE O/P EST MOD 30 MIN: CPT | Mod: S$PBB,,, | Performed by: GENERAL PRACTICE

## 2023-03-21 PROCEDURE — 99999 PR PBB SHADOW E&M-EST. PATIENT-LVL V: CPT | Mod: PBBFAC,,, | Performed by: GENERAL PRACTICE

## 2023-03-21 RX ORDER — CETIRIZINE HYDROCHLORIDE 10 MG/1
TABLET ORAL
COMMUNITY
Start: 2023-02-01

## 2023-03-21 NOTE — PROGRESS NOTES
Subjective:       Patient ID: Carmen Mckinley 68 y.o.     Chief Complaint:   Chief Complaint   Patient presents with    MAC    Follow-up        HPI:   1/11/2022:  66-year-old female patient known to have past medical history of pulmonary SAE, diagnosed in January 2021 presents for follow-up.   The patient had been on 3 times weekly azithromycin, ethambutol, rifampin from January 2021 up until December 2021.  She had significant improvement in her imaging between January 2021 and April 2021.  However her sputum AFB cultures have remained positive up until most recently in September 2021.  Patient contacted our office again in December 2021 after noticing her cultures remained positive.  At that point, I had multiple discussions with the patient as documented in the chart prior to today's visit, and switched her medication regimen to daily administration of rifampin, ethambutol, azithromycin.  Most recent susceptibility testing shows isolate is still sensitive to macrolides.   She was also experiencing side effects from the rifampin intermittent dosing, and the shape of flulike symptoms.  Since switching to daily dosing of the medication, she reports that her symptoms have subsided.  She notes ongoing cough however no significant changes in its frequency and no increase in sputum production.  She denies any weight loss, no fevers.  No shortness of breath.  She also denies any abdominal pain, no diarrhea, no changes in the urine color or in the stool color.  She denies any fatigue and reports normal appetite.  Most recent blood work done 12/29/2021, showing mild hyperbilirubinemia.  Otherwise normal LFTs. [1]    03/03/2022:   Patient presents today for follow-up.  She reports her cough is minimal and does not have any shortness of breath.  No fevers, no chills, no weight loss, no night sweats.  At our last visit we had paged her regimen from intermittent dosing to daily dosing of azithromycin, rifampin, ethambutol given  that she has not cleared her sputum cultures yet despite about a year of therapy.  She reports good tolerability to the antimicrobials.    06/02/2022:  No fevers, no chills, continues to have a mild cough especially in the morning. Otherwise no weight loss and continues with activities of daily living. She is taking Rifampin, Azithromycin, Ethambutol daily at this time. Her most recent cultures collected 03/2022 remain positive after 3 months on daily regimen. Repeat CT scan stable from prior.    07/28/2022  Presents for follow up. Had COVID since last visit but only mild symptoms. Cx sputum from 06/2022 and 04/2022 remain positive for SAE despite 6 months of the daily dosing regimen. Susceptibilities from 04/2022 with no resistance to Macrolides and S to Aminoglycosides. Otherwise clinically feels well, no fevers, no chills.     11/16/2022:  Today for follow-up.  Unfortunately she has developed toxicity from her amikacin.  She reports that about 7-10 days prior to notifying us, she had started noticing some increased fatigue as well as decreased hearing acuity.  Initially did not think much of it as she was nearing the end of her therapy however these symptoms continued to worsen and she informed our office.  At that time around 10/20/2022, amikacin was discontinued.  Referral was made to audiology (after baseline audiology evaluation) and labs ordered by patient's primary care physician. Showing worsening anemia to 9.8 from 12 and ARNOL (creatinine of 1.8 from baseline 0.9). She does report some improvement since discontinuing amikacin.  Although her hearing continues to be affected, her energy is improved and her appetite is back.  She reports no concerns with urination.  And all-in-all has been doing better since discontinuing Amkacin.  She continues to have the PICC line in place.    12/15/2022:  Since her last visit, she reports some improvement. She has more energy and is more active. She continues to have  episodes of diarrhea which are not constant but are significant in terms of affecting her lifestyle. She followed up with audiology who reports her hearing loss is less likely related to Aminoglycoside however, given timing of worsening of hearing loss, it is likely that it contributed to that. She followed up with nephrology and her kidney function is recovering slowly, her GFR is at 41 most recently, she is due to repeat her labs in early January and follow up with nephrology as well as our office. Her repeat cultures collected 2022 have so far remained negative per my discussion with micro lab.     2023:  Kidney function improved with creatinine of 1.2 and GFR of 48. She is tolerating antimycobacterials better with no diarrhea since she started eating yogurt regularly. Currently without new concerns. She has negative AFB culture in 2022.      Past Medical History:   Diagnosis Date    Anxiety     Arthritis     Depression     GERD (gastroesophageal reflux disease)     Hyperlipidemia     Hypertension         Past Surgical History:   Procedure Laterality Date    APPENDECTOMY       SECTION      ECTOPIC PREGNANCY SURGERY      FUNCTIONAL ENDOSCOPIC SINUS SURGERY (FESS) Bilateral     MANDIBLE SURGERY Bilateral     PERIPHERALLY INSERTED CENTRAL CATHETER INSERTION N/A 2022    Procedure: INSERTION, PICC;  Surgeon: Ismael Stroud MD;  Location: [facility];  Service: Infectious Disease;  Laterality: N/A;    WRIST FRACTURE SURGERY          Social History     Socioeconomic History    Marital status:    Tobacco Use    Smoking status: Former    Smokeless tobacco: Never   Substance and Sexual Activity    Alcohol use: Not Currently    Drug use: Never        Family History   Problem Relation Age of Onset    Asbestos Mother     Suicide Father         Review of patient's allergies indicates:  No Known Allergies       There is no immunization history on file for this patient.     Review of Systems   All  "other systems reviewed and are negative.       Objective:      BP (!) 142/78 (BP Location: Left arm)   Pulse 69   Temp 97.9 °F (36.6 °C) (Oral)   Resp 18   Ht 5' 4" (1.626 m)   Wt 90.7 kg (199 lb 15.3 oz)   SpO2 98%   BMI 34.32 kg/m²      Physical Exam  Constitutional:       Appearance: Normal appearance.   HENT:      Head: Normocephalic and atraumatic.      Mouth/Throat:      Pharynx: No oropharyngeal exudate or posterior oropharyngeal erythema.   Eyes:      Extraocular Movements: Extraocular movements intact.      Pupils: Pupils are equal, round, and reactive to light.   Cardiovascular:      Rate and Rhythm: Normal rate and regular rhythm.      Heart sounds: No murmur heard.  Pulmonary:      Effort: No respiratory distress.      Breath sounds: No wheezing, rhonchi or rales.   Abdominal:      General: Bowel sounds are normal. There is no distension.      Palpations: Abdomen is soft.      Tenderness: There is no abdominal tenderness. There is no right CVA tenderness or left CVA tenderness.   Musculoskeletal:         General: No swelling or tenderness.      Cervical back: Neck supple. No rigidity or tenderness.   Lymphadenopathy:      Cervical: No cervical adenopathy.   Skin:     Findings: No lesion or rash.   Neurological:      General: No focal deficit present.      Mental Status: She is alert and oriented to person, place, and time. Mental status is at baseline.      Cranial Nerves: No cranial nerve deficit.      Motor: No weakness.   Psychiatric:         Mood and Affect: Mood normal.         Behavior: Behavior normal.          Assessment:       Problem List Items Addressed This Visit          ENT    Ototoxicity of both ears       Pulmonary    Pulmonary emphysema    Bronchiectasis without complication       Renal/    CKD (chronic kidney disease)       ID    Pulmonary Mycobacterium avium complex (MAC) infection    SAE (mycobacterium avium-intracellulare) - Primary    Relevant Orders    CT Chest Without " Contrast              Plan:       -Sputum cultures from 04/2022 and 06/2022 remain positive for MAC despite daily dosing regimen  -completed three-month course of amikacin  -Kidney function continues to improve and hearing aids have been successful in helping her hearing loss  -continue with azithromycin 500 mg p.o. Q 24 hours, ethambutol 15 milligrams/kilograms p.o. Q 24 hours, rifampin 300 mg p.o. Q 24 hours   -discussed with the patient hydration and good p.o. intake, she continues to follow up with nephrology as well  -repeat a CT scan done on 07/2022 showing general improvement, will need to be repeated around the next visit   -Overall improved from prior visit  -Repeat cultures collected on 11/21/2022 which so far remain negative  -If all continues in this direction, will continue treatment until 11/2023   -discussed risk of recurrence and methods to reduce this risk including masking while gardening especially fi using water which will spray/aerosolize and to make sure she has good ventilation in bathroom while showering etc    Follow up in about 3 months (around 6/21/2023).    35 minutes of total time spent on the encounter, which includes face to face time and non-face to face time preparing to see the patient (eg, review of tests), Obtaining and/or reviewing separately obtained history, Documenting clinical information in the electronic or other health record, Independently interpreting results (not separately reported) and communicating results to the patient/family/caregiver, or Care coordination (not separately reported).

## 2023-03-22 ENCOUNTER — TELEPHONE (OUTPATIENT)
Dept: INFECTIOUS DISEASES | Facility: CLINIC | Age: 68
End: 2023-03-22
Payer: MEDICARE

## 2023-03-22 PROBLEM — N18.9 CKD (CHRONIC KIDNEY DISEASE): Status: ACTIVE | Noted: 2023-03-22

## 2023-03-22 NOTE — TELEPHONE ENCOUNTER
Called [facility] scheduling[facility] to schedule Ct chest 2 months out. May 29,2023 arrival 0830, actual test time is 0900.      Called patient instructions given regarding ct date and time, voiced understanding.

## 2023-05-29 ENCOUNTER — HOSPITAL ENCOUNTER (OUTPATIENT)
Dept: RADIOLOGY | Facility: HOSPITAL | Age: 68
Discharge: HOME | End: 2023-05-29
Attending: GENERAL PRACTICE
Payer: MEDICARE

## 2023-05-29 DIAGNOSIS — A31.0 MAI (MYCOBACTERIUM AVIUM-INTRACELLULARE): ICD-10-CM

## 2023-05-29 PROCEDURE — 71250 CT THORAX DX C-: CPT | Mod: TC

## 2023-06-22 ENCOUNTER — OFFICE VISIT (OUTPATIENT)
Dept: INFECTIOUS DISEASES | Facility: CLINIC | Age: 68
End: 2023-06-22
Payer: MEDICARE

## 2023-06-22 VITALS
DIASTOLIC BLOOD PRESSURE: 80 MMHG | BODY MASS INDEX: 34.94 KG/M2 | OXYGEN SATURATION: 96 % | SYSTOLIC BLOOD PRESSURE: 154 MMHG | HEART RATE: 72 BPM | HEIGHT: 64 IN | RESPIRATION RATE: 18 BRPM | WEIGHT: 204.63 LBS

## 2023-06-22 DIAGNOSIS — J47.9 BRONCHIECTASIS WITHOUT COMPLICATION: ICD-10-CM

## 2023-06-22 DIAGNOSIS — A31.0 MAI (MYCOBACTERIUM AVIUM-INTRACELLULARE): Primary | ICD-10-CM

## 2023-06-22 DIAGNOSIS — A31.0 PULMONARY MYCOBACTERIUM AVIUM COMPLEX (MAC) INFECTION: ICD-10-CM

## 2023-06-22 DIAGNOSIS — N18.9 CHRONIC KIDNEY DISEASE, UNSPECIFIED CKD STAGE: ICD-10-CM

## 2023-06-22 DIAGNOSIS — H93.8X3 OTOTOXICITY OF BOTH EARS: ICD-10-CM

## 2023-06-22 PROCEDURE — 99999 PR PBB SHADOW E&M-EST. PATIENT-LVL IV: CPT | Mod: PBBFAC,,, | Performed by: GENERAL PRACTICE

## 2023-06-22 PROCEDURE — 99999 PR PBB SHADOW E&M-EST. PATIENT-LVL IV: ICD-10-PCS | Mod: PBBFAC,,, | Performed by: GENERAL PRACTICE

## 2023-06-22 PROCEDURE — 99214 OFFICE O/P EST MOD 30 MIN: CPT | Mod: PBBFAC | Performed by: GENERAL PRACTICE

## 2023-06-22 PROCEDURE — 99214 PR OFFICE/OUTPT VISIT, EST, LEVL IV, 30-39 MIN: ICD-10-PCS | Mod: S$PBB,,, | Performed by: GENERAL PRACTICE

## 2023-06-22 PROCEDURE — 99214 OFFICE O/P EST MOD 30 MIN: CPT | Mod: S$PBB,,, | Performed by: GENERAL PRACTICE

## 2023-06-22 RX ORDER — AZITHROMYCIN 500 MG/1
500 TABLET, FILM COATED ORAL DAILY
Qty: 90 TABLET | Refills: 0 | Status: SHIPPED | OUTPATIENT
Start: 2023-06-22 | End: 2024-02-15

## 2023-06-28 ENCOUNTER — LAB VISIT (OUTPATIENT)
Dept: LAB | Facility: HOSPITAL | Age: 68
End: 2023-06-28
Attending: GENERAL PRACTICE
Payer: MEDICARE

## 2023-06-28 DIAGNOSIS — A31.0 PULMONARY MYCOBACTERIUM AVIUM COMPLEX (MAC) INFECTION: ICD-10-CM

## 2023-06-28 LAB
ALBUMIN SERPL-MCNC: 4.1 G/DL (ref 3.4–4.8)
ALBUMIN/GLOB SERPL: 1.3 RATIO (ref 1.1–2)
ALP SERPL-CCNC: 41 UNIT/L (ref 40–150)
ALT SERPL-CCNC: 10 UNIT/L (ref 0–55)
AST SERPL-CCNC: 14 UNIT/L (ref 5–34)
BASOPHILS # BLD AUTO: 0.03 X10(3)/MCL
BASOPHILS NFR BLD AUTO: 0.6 %
BILIRUBIN DIRECT+TOT PNL SERPL-MCNC: 0.5 MG/DL
BUN SERPL-MCNC: 25 MG/DL (ref 9.8–20.1)
CALCIUM SERPL-MCNC: 9.6 MG/DL (ref 8.4–10.2)
CHLORIDE SERPL-SCNC: 103 MMOL/L (ref 98–107)
CO2 SERPL-SCNC: 27 MMOL/L (ref 23–31)
CREAT SERPL-MCNC: 1.16 MG/DL (ref 0.55–1.02)
EOSINOPHIL # BLD AUTO: 0.22 X10(3)/MCL (ref 0–0.9)
EOSINOPHIL NFR BLD AUTO: 4.5 %
ERYTHROCYTE [DISTWIDTH] IN BLOOD BY AUTOMATED COUNT: 12.6 % (ref 11.5–17)
GFR SERPLBLD CREATININE-BSD FMLA CKD-EPI: 51 MLS/MIN/1.73/M2
GLOBULIN SER-MCNC: 3.2 GM/DL (ref 2.4–3.5)
GLUCOSE SERPL-MCNC: 93 MG/DL (ref 82–115)
HCT VFR BLD AUTO: 34.1 % (ref 37–47)
HGB BLD-MCNC: 11.3 G/DL (ref 12–16)
IMM GRANULOCYTES # BLD AUTO: 0.02 X10(3)/MCL (ref 0–0.04)
IMM GRANULOCYTES NFR BLD AUTO: 0.4 %
LYMPHOCYTES # BLD AUTO: 0.98 X10(3)/MCL (ref 0.6–4.6)
LYMPHOCYTES NFR BLD AUTO: 20 %
MCH RBC QN AUTO: 31.7 PG (ref 27–31)
MCHC RBC AUTO-ENTMCNC: 33.1 G/DL (ref 33–36)
MCV RBC AUTO: 95.5 FL (ref 80–94)
MONOCYTES # BLD AUTO: 0.64 X10(3)/MCL (ref 0.1–1.3)
MONOCYTES NFR BLD AUTO: 13 %
NEUTROPHILS # BLD AUTO: 3.02 X10(3)/MCL (ref 2.1–9.2)
NEUTROPHILS NFR BLD AUTO: 61.5 %
NRBC BLD AUTO-RTO: 0 %
PLATELET # BLD AUTO: 292 X10(3)/MCL (ref 130–400)
PMV BLD AUTO: 9.9 FL (ref 7.4–10.4)
POTASSIUM SERPL-SCNC: 4.4 MMOL/L (ref 3.5–5.1)
PROT SERPL-MCNC: 7.3 GM/DL (ref 5.8–7.6)
RBC # BLD AUTO: 3.57 X10(6)/MCL (ref 4.2–5.4)
SODIUM SERPL-SCNC: 137 MMOL/L (ref 136–145)
WBC # SPEC AUTO: 4.91 X10(3)/MCL (ref 4.5–11.5)

## 2023-06-28 PROCEDURE — 80053 COMPREHEN METABOLIC PANEL: CPT

## 2023-06-28 PROCEDURE — 85025 COMPLETE CBC W/AUTO DIFF WBC: CPT

## 2023-06-28 PROCEDURE — 36415 COLL VENOUS BLD VENIPUNCTURE: CPT

## 2023-07-28 ENCOUNTER — LAB VISIT (OUTPATIENT)
Dept: LAB | Facility: HOSPITAL | Age: 68
End: 2023-07-28
Attending: GENERAL PRACTICE
Payer: MEDICARE

## 2023-07-28 DIAGNOSIS — A31.0 PULMONARY MYCOBACTERIUM AVIUM COMPLEX (MAC) INFECTION: ICD-10-CM

## 2023-07-28 LAB
ALBUMIN SERPL-MCNC: 4.1 G/DL (ref 3.4–4.8)
ALBUMIN/GLOB SERPL: 1.3 RATIO (ref 1.1–2)
ALP SERPL-CCNC: 39 UNIT/L (ref 40–150)
ALT SERPL-CCNC: 11 UNIT/L (ref 0–55)
AST SERPL-CCNC: 16 UNIT/L (ref 5–34)
BASOPHILS # BLD AUTO: 0.03 X10(3)/MCL
BASOPHILS NFR BLD AUTO: 0.6 %
BILIRUBIN DIRECT+TOT PNL SERPL-MCNC: 0.7 MG/DL
BUN SERPL-MCNC: 25.2 MG/DL (ref 9.8–20.1)
CALCIUM SERPL-MCNC: 9.5 MG/DL (ref 8.4–10.2)
CHLORIDE SERPL-SCNC: 101 MMOL/L (ref 98–107)
CO2 SERPL-SCNC: 26 MMOL/L (ref 23–31)
CREAT SERPL-MCNC: 1.15 MG/DL (ref 0.55–1.02)
EOSINOPHIL # BLD AUTO: 0.25 X10(3)/MCL (ref 0–0.9)
EOSINOPHIL NFR BLD AUTO: 5.2 %
ERYTHROCYTE [DISTWIDTH] IN BLOOD BY AUTOMATED COUNT: 12.6 % (ref 11.5–17)
GFR SERPLBLD CREATININE-BSD FMLA CKD-EPI: 51 MLS/MIN/1.73/M2
GLOBULIN SER-MCNC: 3.1 GM/DL (ref 2.4–3.5)
GLUCOSE SERPL-MCNC: 134 MG/DL (ref 82–115)
HCT VFR BLD AUTO: 33.6 % (ref 37–47)
HGB BLD-MCNC: 11.1 G/DL (ref 12–16)
IMM GRANULOCYTES # BLD AUTO: 0.02 X10(3)/MCL (ref 0–0.04)
IMM GRANULOCYTES NFR BLD AUTO: 0.4 %
LYMPHOCYTES # BLD AUTO: 0.79 X10(3)/MCL (ref 0.6–4.6)
LYMPHOCYTES NFR BLD AUTO: 16.5 %
MCH RBC QN AUTO: 30.7 PG (ref 27–31)
MCHC RBC AUTO-ENTMCNC: 33 G/DL (ref 33–36)
MCV RBC AUTO: 93.1 FL (ref 80–94)
MONOCYTES # BLD AUTO: 0.48 X10(3)/MCL (ref 0.1–1.3)
MONOCYTES NFR BLD AUTO: 10 %
NEUTROPHILS # BLD AUTO: 3.23 X10(3)/MCL (ref 2.1–9.2)
NEUTROPHILS NFR BLD AUTO: 67.3 %
NRBC BLD AUTO-RTO: 0 %
PLATELET # BLD AUTO: 273 X10(3)/MCL (ref 130–400)
PMV BLD AUTO: 10.1 FL (ref 7.4–10.4)
POTASSIUM SERPL-SCNC: 4.4 MMOL/L (ref 3.5–5.1)
PROT SERPL-MCNC: 7.2 GM/DL (ref 5.8–7.6)
RBC # BLD AUTO: 3.61 X10(6)/MCL (ref 4.2–5.4)
SODIUM SERPL-SCNC: 139 MMOL/L (ref 136–145)
WBC # SPEC AUTO: 4.8 X10(3)/MCL (ref 4.5–11.5)

## 2023-07-28 PROCEDURE — 36415 COLL VENOUS BLD VENIPUNCTURE: CPT

## 2023-07-28 PROCEDURE — 80053 COMPREHEN METABOLIC PANEL: CPT

## 2023-07-28 PROCEDURE — 85025 COMPLETE CBC W/AUTO DIFF WBC: CPT

## 2023-08-22 ENCOUNTER — LAB VISIT (OUTPATIENT)
Dept: LAB | Facility: HOSPITAL | Age: 68
End: 2023-08-22
Attending: INTERNAL MEDICINE
Payer: MEDICARE

## 2023-08-22 DIAGNOSIS — N17.9 AKI (ACUTE KIDNEY INJURY): ICD-10-CM

## 2023-08-22 LAB
ALBUMIN SERPL-MCNC: 4.1 G/DL (ref 3.4–4.8)
ALBUMIN/GLOB SERPL: 1.4 RATIO (ref 1.1–2)
ALP SERPL-CCNC: 33 UNIT/L (ref 40–150)
ALT SERPL-CCNC: 9 UNIT/L (ref 0–55)
APPEARANCE UR: CLEAR
AST SERPL-CCNC: 14 UNIT/L (ref 5–34)
BACTERIA #/AREA URNS AUTO: NORMAL /HPF
BASOPHILS # BLD AUTO: 0.04 X10(3)/MCL
BASOPHILS NFR BLD AUTO: 0.8 %
BILIRUB SERPL-MCNC: 0.5 MG/DL
BILIRUB UR QL STRIP.AUTO: NEGATIVE
BUN SERPL-MCNC: 21.7 MG/DL (ref 9.8–20.1)
CALCIUM SERPL-MCNC: 9.9 MG/DL (ref 8.4–10.2)
CHLORIDE SERPL-SCNC: 103 MMOL/L (ref 98–107)
CO2 SERPL-SCNC: 24 MMOL/L (ref 23–31)
COLOR UR: YELLOW
CREAT SERPL-MCNC: 1.14 MG/DL (ref 0.55–1.02)
CREAT UR-MCNC: 21.7 MG/DL (ref 45–106)
EOSINOPHIL # BLD AUTO: 0.2 X10(3)/MCL (ref 0–0.9)
EOSINOPHIL NFR BLD AUTO: 4.1 %
ERYTHROCYTE [DISTWIDTH] IN BLOOD BY AUTOMATED COUNT: 12.7 % (ref 11.5–17)
GFR SERPLBLD CREATININE-BSD FMLA CKD-EPI: 52 MLS/MIN/1.73/M2
GLOBULIN SER-MCNC: 3 GM/DL (ref 2.4–3.5)
GLUCOSE SERPL-MCNC: 99 MG/DL (ref 82–115)
GLUCOSE UR QL STRIP.AUTO: NEGATIVE
HCT VFR BLD AUTO: 33.4 % (ref 37–47)
HGB BLD-MCNC: 11.2 G/DL (ref 12–16)
IMM GRANULOCYTES # BLD AUTO: 0.02 X10(3)/MCL (ref 0–0.04)
IMM GRANULOCYTES NFR BLD AUTO: 0.4 %
KETONES UR QL STRIP.AUTO: NEGATIVE
LEUKOCYTE ESTERASE UR QL STRIP.AUTO: ABNORMAL
LYMPHOCYTES # BLD AUTO: 0.97 X10(3)/MCL (ref 0.6–4.6)
LYMPHOCYTES NFR BLD AUTO: 19.7 %
MCH RBC QN AUTO: 30.9 PG (ref 27–31)
MCHC RBC AUTO-ENTMCNC: 33.5 G/DL (ref 33–36)
MCV RBC AUTO: 92.3 FL (ref 80–94)
MONOCYTES # BLD AUTO: 0.59 X10(3)/MCL (ref 0.1–1.3)
MONOCYTES NFR BLD AUTO: 12 %
NEUTROPHILS # BLD AUTO: 3.1 X10(3)/MCL (ref 2.1–9.2)
NEUTROPHILS NFR BLD AUTO: 63 %
NITRITE UR QL STRIP.AUTO: NEGATIVE
NRBC BLD AUTO-RTO: 0 %
PH UR STRIP.AUTO: 5.5 [PH]
PLATELET # BLD AUTO: 256 X10(3)/MCL (ref 130–400)
PMV BLD AUTO: 10.1 FL (ref 7.4–10.4)
POTASSIUM SERPL-SCNC: 4.4 MMOL/L (ref 3.5–5.1)
PROT SERPL-MCNC: 7.1 GM/DL (ref 5.8–7.6)
PROT UR QL STRIP.AUTO: NEGATIVE
PROT UR STRIP-MCNC: <6.8 MG/DL
RBC # BLD AUTO: 3.62 X10(6)/MCL (ref 4.2–5.4)
RBC #/AREA URNS AUTO: NORMAL /HPF
RBC UR QL AUTO: NEGATIVE
SODIUM SERPL-SCNC: 137 MMOL/L (ref 136–145)
SP GR UR STRIP.AUTO: 1 (ref 1–1.03)
SQUAMOUS #/AREA URNS AUTO: NORMAL /HPF
UROBILINOGEN UR STRIP-ACNC: 0.2
WBC # SPEC AUTO: 4.92 X10(3)/MCL (ref 4.5–11.5)
WBC #/AREA URNS AUTO: NORMAL /HPF

## 2023-08-22 PROCEDURE — 85025 COMPLETE CBC W/AUTO DIFF WBC: CPT

## 2023-08-22 PROCEDURE — 36415 COLL VENOUS BLD VENIPUNCTURE: CPT

## 2023-08-22 PROCEDURE — 82570 ASSAY OF URINE CREATININE: CPT

## 2023-08-22 PROCEDURE — 80053 COMPREHEN METABOLIC PANEL: CPT

## 2023-08-22 PROCEDURE — 81001 URINALYSIS AUTO W/SCOPE: CPT

## 2023-08-31 ENCOUNTER — OFFICE VISIT (OUTPATIENT)
Dept: NEPHROLOGY | Facility: CLINIC | Age: 68
End: 2023-08-31
Payer: MEDICARE

## 2023-08-31 VITALS
HEIGHT: 64 IN | WEIGHT: 210.56 LBS | TEMPERATURE: 98 F | DIASTOLIC BLOOD PRESSURE: 72 MMHG | RESPIRATION RATE: 22 BRPM | BODY MASS INDEX: 35.95 KG/M2 | OXYGEN SATURATION: 98 % | HEART RATE: 77 BPM | SYSTOLIC BLOOD PRESSURE: 144 MMHG

## 2023-08-31 DIAGNOSIS — N28.9 RENAL INSUFFICIENCY: ICD-10-CM

## 2023-08-31 DIAGNOSIS — N17.9 AKI (ACUTE KIDNEY INJURY): Primary | ICD-10-CM

## 2023-08-31 PROCEDURE — 99214 OFFICE O/P EST MOD 30 MIN: CPT | Mod: S$PBB,,,

## 2023-08-31 PROCEDURE — 99214 OFFICE O/P EST MOD 30 MIN: CPT | Mod: PBBFAC

## 2023-08-31 PROCEDURE — 99999 PR PBB SHADOW E&M-EST. PATIENT-LVL IV: ICD-10-PCS | Mod: PBBFAC,,,

## 2023-08-31 PROCEDURE — 99999 PR PBB SHADOW E&M-EST. PATIENT-LVL IV: CPT | Mod: PBBFAC,,,

## 2023-08-31 PROCEDURE — 99214 PR OFFICE/OUTPT VISIT, EST, LEVL IV, 30-39 MIN: ICD-10-PCS | Mod: S$PBB,,,

## 2023-08-31 NOTE — PROGRESS NOTES
[facility] Ambulatory Progress Note      HPI  Carmen Mckinley, 68 y.o. female, presents to office for a follow up visit for ARNOL s/t mycobacterium avium-intracellulare complex treatment. She had MAC for 4 years. At one point patient began showing signs of toxicity (hearing loss, fatigue, acute kidney injury) from amikacin, which was then discontinued around 10/20/2022. She had 29 treatments of it.  She has since been maintained on rifampin and ethambutol.  She is followed closely by ID and has monthly labs completed.  Current plan is to possibly discontinue rifampin in October and November.  Overall she has no acute complaints she hydrate with about 60 oz of water a day.    Patient denies taking NSAIDs or new antibiotics. Also denies recent episode of dehydration, diarrhea, vomiting, acute illness, hospitalization, recent angiograms or exposure to IV radiocontrast.        Medical Diagnoses:   Past Medical History:   Diagnosis Date    Anxiety     Arthritis     Depression     GERD (gastroesophageal reflux disease)     Hyperlipidemia     Hypertension      Patient Active Problem List   Diagnosis    Pulmonary Mycobacterium avium complex (MAC) infection    Chronic cough    Depressive disorder    Gastroesophageal reflux disease    Hypercholesterolemia    Nocardiosis    Primary hypertension    Pulmonary emphysema    SAE (mycobacterium avium-intracellulare)    Ototoxicity of both ears    Bronchiectasis without complication    CKD (chronic kidney disease)       Surgical History:   Past Surgical History:   Procedure Laterality Date    APPENDECTOMY       SECTION      ECTOPIC PREGNANCY SURGERY      FRACTURE SURGERY  2021    Wrist    FUNCTIONAL ENDOSCOPIC SINUS SURGERY (FESS) Bilateral     HERNIA REPAIR  1970s    MANDIBLE SURGERY Bilateral     PERIPHERALLY INSERTED CENTRAL CATHETER INSERTION N/A 2022    Procedure: INSERTION, PICC;  Surgeon: Ismael Stroud MD;  Location: [facility];  Service: Infectious Disease;   Laterality: N/A;    TUBAL LIGATION  05-    WRIST FRACTURE SURGERY         Family History:   Family History   Problem Relation Age of Onset    Asbestos Mother     Cancer Mother         Mesothelioma    Suicide Father     Depression Father         Suicide    Depression Brother         Suicide       Social History:   Social History     Tobacco Use    Smoking status: Former     Current packs/day: 1.00     Average packs/day: 1 pack/day for 18.0 years (18.0 ttl pk-yrs)     Types: Cigarettes    Smokeless tobacco: Never    Tobacco comments:     Quit in 1992   Substance Use Topics    Alcohol use: Not Currently     Comment: Rarely drink alcohol       Allergies:  Review of patient's allergies indicates:  No Known Allergies    Medications:    Current Outpatient Medications:     azithromycin (ZITHROMAX) 500 MG tablet, Take 1 tablet (500 mg total) by mouth once daily., Disp: 90 tablet, Rfl: 0    calcium carbonate (CALCIUM 500 ORAL), Take 500 mg by mouth Daily., Disp: , Rfl:     cetirizine (ZYRTEC) 10 MG tablet, , Disp: , Rfl:     ethambutoL (MYAMBUTOL) 400 MG Tab, Take 3 1/2 tabs daily (1400mg), Disp: 315 tablet, Rfl: 1    fenofibrate (TRICOR) 145 MG tablet, Take 145 mg by mouth., Disp: , Rfl:     fish oil-dha-epa 1,200-144-216 mg Cap, Take by mouth., Disp: , Rfl:     fluticasone propionate (FLONASE ALLERGY RELIEF NASL), , Disp: , Rfl:     folic acid (FOLVITE) 1 MG tablet, Take 1,000 mcg by mouth once daily., Disp: , Rfl:     glucosamine-chondroitin 500-400 mg tablet, Take 1 tablet by mouth once daily., Disp: , Rfl:     melatonin 10 mg Tab, Take by mouth., Disp: , Rfl:     propranoloL (INDERAL) 80 MG tablet, Take 80 mg by mouth once daily., Disp: , Rfl:     rifAMpin (RIFADIN) 300 MG capsule, Take 2 capsules (600 mg total) by mouth once daily., Disp: 180 capsule, Rfl: 1    sertraline (ZOLOFT) 100 MG tablet, Take 100 mg by mouth once daily., Disp: , Rfl:        Review of Systems:    Constitutional: Denies fever, fatigue,  "generalized weakness  Skin: Denies wounds, no rashes, no itching, no new skin lesions  Respiratory:  Denies shortness of breath, or wheezing; + chronic cough  Cardiovascular: Denies chest pain, palpitations, or swelling  Gastrointestional: Denies abdominal pain, nausea, vomiting, diarrhea, or constipation  Genitourinary: Denies dysuria, hematuria, foamy urine, or incontinence; reports able to empty bladder  Musculoskeletal: Denies back or flank pain  Neurological: Denies headaches, dizziness, paresthesias, tremors or focal weakness      Vital Signs:  BP (!) 144/72 (BP Location: Left arm)   Pulse 77   Temp 98 °F (36.7 °C) (Temporal)   Resp (!) 22   Ht 5' 4" (1.626 m)   Wt 95.5 kg (210 lb 8.6 oz)   SpO2 98%   BMI 36.14 kg/m²   Body mass index is 36.14 kg/m².      Physical Exam:    General: no acute distress, awake, alert  Eyes: conjunctiva clear, eyelids without swelling  HENT: atraumatic, oropharynx and nasal mucosa patent  Neck: supple, trache midline, no JVD  Respiratory: equal, unlabored, clear to auscultation A/P  Cardiovascular: RRR without murmur or rub  Edema: none  Gastrointestinal: soft, non-tender, non-distended  Musculoskeletal: ROM without new limitation or discomfort  Integumentary: warm, dry; no rashes, wounds, or skin lesions  Neurological: oriented x4, appropriate, no acute deficits      Labs:        Component Value Date/Time     08/22/2023 1400     07/28/2023 1440    K 4.4 08/22/2023 1400    K 4.4 07/28/2023 1440    CO2 24 08/22/2023 1400    CO2 26 07/28/2023 1440    BUN 21.7 (H) 08/22/2023 1400    BUN 25.2 (H) 07/28/2023 1440    CREATININE 1.14 (H) 08/22/2023 1400    CREATININE 1.15 (H) 07/28/2023 1440    CREATININE 1.16 (H) 06/28/2023 1355    CREATININE 1.33 (H) 05/29/2023 0847    CALCIUM 9.9 08/22/2023 1400    CALCIUM 9.5 07/28/2023 1440    PHOS 3.6 03/01/2023 1123           Component Value Date/Time    WBC 4.92 08/22/2023 1400    WBC 4.80 07/28/2023 1440    HGB 11.2 (L) " 08/22/2023 1400    HGB 11.1 (L) 07/28/2023 1440    HCT 33.4 (L) 08/22/2023 1400    HCT 33.6 (L) 07/28/2023 1440     08/22/2023 1400     07/28/2023 1440       Urine Creatinine   Date Value Ref Range Status   08/22/2023 21.7 (L) 45.0 - 106.0 mg/dL Final   05/29/2023 24.9 (L) 47.0 - 110.0 mg/dL Final       Urine Protein Level   Date Value Ref Range Status   08/22/2023 <6.8 mg/dL Final   05/29/2023 <6.8 mg/dL Final       Impression:    1. ARNOL (acute kidney injury)          ARNOL secondary to aminoglycosides  Possible baseline CKD3a  Pt is maintained on rifampin which could be contributing to continued renal insufficiency, though stable  Electrolytes stable  No proteinuria    Plan:  Follow up in  6 months with labs within the week before.  Asked that in 3 months she send her lab work to us that will be completed by ID    Patient to call our office with any concerns prior to next appointment.    Avoid NSAIDs (Aleve, Mobic, Celebrex, Ibuprofen, Advil, Toradol and Diclofenac).  Only take tylenol occasionally if needed for aches/pains.    KIT Alba    This note was created with the assistance of [employer] voice recognition software or phone dictation. There may be transcription errors as a result of using this technology however minimal. Effort has been made to assure accuracy of transcription but any obvious errors or omissions should be clarified with the author of the document.

## 2023-09-12 DIAGNOSIS — A31.0 PULMONARY MYCOBACTERIUM AVIUM COMPLEX (MAC) INFECTION: ICD-10-CM

## 2023-09-12 DIAGNOSIS — A31.0 PULMONARY DISEASE DUE TO MYCOBACTERIA: ICD-10-CM

## 2023-09-12 RX ORDER — RIFAMPIN 300 MG/1
600 CAPSULE ORAL DAILY
Qty: 68 CAPSULE | Refills: 0 | Status: SHIPPED | OUTPATIENT
Start: 2023-09-12 | End: 2023-09-13 | Stop reason: SDUPTHER

## 2023-09-12 RX ORDER — ETHAMBUTOL HYDROCHLORIDE 400 MG/1
TABLET, FILM COATED ORAL
Qty: 140 TABLET | Refills: 0 | Status: SHIPPED | OUTPATIENT
Start: 2023-09-12 | End: 2023-09-13 | Stop reason: SDUPTHER

## 2023-09-13 ENCOUNTER — TELEPHONE (OUTPATIENT)
Dept: INFECTIOUS DISEASES | Facility: CLINIC | Age: 68
End: 2023-09-13
Payer: MEDICARE

## 2023-09-13 DIAGNOSIS — A31.0 PULMONARY DISEASE DUE TO MYCOBACTERIA: ICD-10-CM

## 2023-09-13 DIAGNOSIS — A31.0 PULMONARY MYCOBACTERIUM AVIUM COMPLEX (MAC) INFECTION: Primary | ICD-10-CM

## 2023-09-13 RX ORDER — ETHAMBUTOL HYDROCHLORIDE 400 MG/1
TABLET, FILM COATED ORAL
Qty: 140 TABLET | Refills: 0 | Status: SHIPPED | OUTPATIENT
Start: 2023-09-13 | End: 2024-02-15

## 2023-09-13 RX ORDER — RIFAMPIN 300 MG/1
600 CAPSULE ORAL DAILY
Qty: 68 CAPSULE | Refills: 0 | Status: SHIPPED | OUTPATIENT
Start: 2023-09-13 | End: 2024-02-15

## 2023-09-13 NOTE — TELEPHONE ENCOUNTER
----- Message from KIT Reed sent at 9/12/2023  4:03 PM CDT -----  Regarding: RE: medication refil  I will give her enough pills until she sees Dr. Stroud on 10/23 to discuss plans for antibiotics. I will send them in to her pharmacy.   ----- Message -----  From: Kathi Figueroa LPN  Sent: 9/12/2023   3:21 PM CDT  To: KIT Reed  Subject: RE: medication refil                             Returned call to patient, she states, she is running out of Ethambutol and Rifampin and treatment is supposed to go until 11/23. Is this still the plan or does she need a larger quantity refill?    Rifampin 300 mg tab :qty 75   Ethambutol 3 1/2 pills a day: qty 165   This gets her through to 11/1/23.     ----- Message -----  From: Kelsi Purcell  Sent: 9/12/2023   2:47 PM CDT  To: Kathi Figueroa LPN  Subject: medication refil                                 Pt needs to talk with you about medication refills because the doctor will be taking her off the meds soon.  pt #[phone]

## 2023-09-13 NOTE — TELEPHONE ENCOUNTER
Pt called and requested send refill to [facility] so she can receive them in time.

## 2023-09-28 ENCOUNTER — LAB VISIT (OUTPATIENT)
Dept: LAB | Facility: HOSPITAL | Age: 68
End: 2023-09-28
Attending: GENERAL PRACTICE
Payer: MEDICARE

## 2023-09-28 DIAGNOSIS — A31.0 PULMONARY MYCOBACTERIUM AVIUM COMPLEX (MAC) INFECTION: ICD-10-CM

## 2023-09-28 LAB
ALBUMIN SERPL-MCNC: 3.9 G/DL (ref 3.4–4.8)
ALBUMIN/GLOB SERPL: 1.3 RATIO (ref 1.1–2)
ALP SERPL-CCNC: 33 UNIT/L (ref 40–150)
ALT SERPL-CCNC: 9 UNIT/L (ref 0–55)
AST SERPL-CCNC: 14 UNIT/L (ref 5–34)
BASOPHILS # BLD AUTO: 0.03 X10(3)/MCL
BASOPHILS NFR BLD AUTO: 0.6 %
BILIRUB SERPL-MCNC: 0.5 MG/DL
BUN SERPL-MCNC: 19.1 MG/DL (ref 9.8–20.1)
CALCIUM SERPL-MCNC: 9.2 MG/DL (ref 8.4–10.2)
CHLORIDE SERPL-SCNC: 103 MMOL/L (ref 98–107)
CO2 SERPL-SCNC: 22 MMOL/L (ref 23–31)
CREAT SERPL-MCNC: 1 MG/DL (ref 0.55–1.02)
EOSINOPHIL # BLD AUTO: 0.22 X10(3)/MCL (ref 0–0.9)
EOSINOPHIL NFR BLD AUTO: 4.6 %
ERYTHROCYTE [DISTWIDTH] IN BLOOD BY AUTOMATED COUNT: 12.9 % (ref 11.5–17)
GFR SERPLBLD CREATININE-BSD FMLA CKD-EPI: >60 MLS/MIN/1.73/M2
GLOBULIN SER-MCNC: 3 GM/DL (ref 2.4–3.5)
GLUCOSE SERPL-MCNC: 102 MG/DL (ref 82–115)
HCT VFR BLD AUTO: 33.4 % (ref 37–47)
HGB BLD-MCNC: 11 G/DL (ref 12–16)
IMM GRANULOCYTES # BLD AUTO: 0.02 X10(3)/MCL (ref 0–0.04)
IMM GRANULOCYTES NFR BLD AUTO: 0.4 %
LYMPHOCYTES # BLD AUTO: 0.97 X10(3)/MCL (ref 0.6–4.6)
LYMPHOCYTES NFR BLD AUTO: 20.1 %
MCH RBC QN AUTO: 31.3 PG (ref 27–31)
MCHC RBC AUTO-ENTMCNC: 32.9 G/DL (ref 33–36)
MCV RBC AUTO: 95.2 FL (ref 80–94)
MONOCYTES # BLD AUTO: 0.61 X10(3)/MCL (ref 0.1–1.3)
MONOCYTES NFR BLD AUTO: 12.6 %
NEUTROPHILS # BLD AUTO: 2.98 X10(3)/MCL (ref 2.1–9.2)
NEUTROPHILS NFR BLD AUTO: 61.7 %
NRBC BLD AUTO-RTO: 0 %
PLATELET # BLD AUTO: 260 X10(3)/MCL (ref 130–400)
PMV BLD AUTO: 9.9 FL (ref 7.4–10.4)
POTASSIUM SERPL-SCNC: 4.5 MMOL/L (ref 3.5–5.1)
PROT SERPL-MCNC: 6.9 GM/DL (ref 5.8–7.6)
RBC # BLD AUTO: 3.51 X10(6)/MCL (ref 4.2–5.4)
SODIUM SERPL-SCNC: 134 MMOL/L (ref 136–145)
WBC # SPEC AUTO: 4.83 X10(3)/MCL (ref 4.5–11.5)

## 2023-09-28 PROCEDURE — 85025 COMPLETE CBC W/AUTO DIFF WBC: CPT

## 2023-09-28 PROCEDURE — 80053 COMPREHEN METABOLIC PANEL: CPT

## 2023-09-28 PROCEDURE — 36415 COLL VENOUS BLD VENIPUNCTURE: CPT

## 2023-10-26 ENCOUNTER — OFFICE VISIT (OUTPATIENT)
Dept: INFECTIOUS DISEASES | Facility: CLINIC | Age: 68
End: 2023-10-26
Payer: MEDICARE

## 2023-10-26 VITALS
OXYGEN SATURATION: 96 % | BODY MASS INDEX: 36.02 KG/M2 | DIASTOLIC BLOOD PRESSURE: 75 MMHG | RESPIRATION RATE: 18 BRPM | SYSTOLIC BLOOD PRESSURE: 147 MMHG | WEIGHT: 211 LBS | HEART RATE: 75 BPM | HEIGHT: 64 IN

## 2023-10-26 DIAGNOSIS — A31.0 MAI (MYCOBACTERIUM AVIUM-INTRACELLULARE): ICD-10-CM

## 2023-10-26 DIAGNOSIS — A31.0 PULMONARY MYCOBACTERIUM AVIUM COMPLEX (MAC) INFECTION: Primary | ICD-10-CM

## 2023-10-26 PROCEDURE — 99214 PR OFFICE/OUTPT VISIT, EST, LEVL IV, 30-39 MIN: ICD-10-PCS | Mod: S$PBB,,, | Performed by: GENERAL PRACTICE

## 2023-10-26 PROCEDURE — 99999 PR PBB SHADOW E&M-EST. PATIENT-LVL IV: CPT | Mod: PBBFAC,,, | Performed by: GENERAL PRACTICE

## 2023-10-26 PROCEDURE — 99214 OFFICE O/P EST MOD 30 MIN: CPT | Mod: PBBFAC | Performed by: GENERAL PRACTICE

## 2023-10-26 PROCEDURE — 99214 OFFICE O/P EST MOD 30 MIN: CPT | Mod: S$PBB,,, | Performed by: GENERAL PRACTICE

## 2023-10-26 PROCEDURE — 99999 PR PBB SHADOW E&M-EST. PATIENT-LVL IV: ICD-10-PCS | Mod: PBBFAC,,, | Performed by: GENERAL PRACTICE

## 2023-10-26 RX ORDER — HYDROCHLOROTHIAZIDE 12.5 MG/1
1 TABLET ORAL DAILY
COMMUNITY
End: 2024-02-15

## 2023-10-26 RX ORDER — FLUCONAZOLE 150 MG/1
TABLET ORAL
COMMUNITY
End: 2024-02-15

## 2023-10-26 RX ORDER — PANTOPRAZOLE SODIUM 40 MG/1
1 TABLET, DELAYED RELEASE ORAL DAILY
COMMUNITY
End: 2024-02-15

## 2023-10-26 NOTE — PROGRESS NOTES
Subjective:       Patient ID: Carmen Mckinley 68 y.o.     Chief Complaint:   Chief Complaint   Patient presents with    4mnth f/u         HPI:   1/11/2022:  66-year-old female patient known to have past medical history of pulmonary SAE, diagnosed in January 2021 presents for follow-up.   The patient had been on 3 times weekly azithromycin, ethambutol, rifampin from January 2021 up until December 2021.  She had significant improvement in her imaging between January 2021 and April 2021.  However her sputum AFB cultures have remained positive up until most recently in September 2021.  Patient contacted our office again in December 2021 after noticing her cultures remained positive.  At that point, I had multiple discussions with the patient as documented in the chart prior to today's visit, and switched her medication regimen to daily administration of rifampin, ethambutol, azithromycin.  Most recent susceptibility testing shows isolate is still sensitive to macrolides.   She was also experiencing side effects from the rifampin intermittent dosing, and the shape of flulike symptoms.  Since switching to daily dosing of the medication, she reports that her symptoms have subsided.  She notes ongoing cough however no significant changes in its frequency and no increase in sputum production.  She denies any weight loss, no fevers.  No shortness of breath.  She also denies any abdominal pain, no diarrhea, no changes in the urine color or in the stool color.  She denies any fatigue and reports normal appetite.  Most recent blood work done 12/29/2021, showing mild hyperbilirubinemia.  Otherwise normal LFTs. [1]    03/03/2022:   Patient presents today for follow-up.  She reports her cough is minimal and does not have any shortness of breath.  No fevers, no chills, no weight loss, no night sweats.  At our last visit we had paged her regimen from intermittent dosing to daily dosing of azithromycin, rifampin, ethambutol given that she  has not cleared her sputum cultures yet despite about a year of therapy.  She reports good tolerability to the antimicrobials.    06/02/2022:  No fevers, no chills, continues to have a mild cough especially in the morning. Otherwise no weight loss and continues with activities of daily living. She is taking Rifampin, Azithromycin, Ethambutol daily at this time. Her most recent cultures collected 03/2022 remain positive after 3 months on daily regimen. Repeat CT scan stable from prior.    07/28/2022  Presents for follow up. Had COVID since last visit but only mild symptoms. Cx sputum from 06/2022 and 04/2022 remain positive for SEA despite 6 months of the daily dosing regimen. Susceptibilities from 04/2022 with no resistance to Macrolides and S to Aminoglycosides. Otherwise clinically feels well, no fevers, no chills.     11/16/2022:  Today for follow-up.  Unfortunately she has developed toxicity from her amikacin.  She reports that about 7-10 days prior to notifying us, she had started noticing some increased fatigue as well as decreased hearing acuity.  Initially did not think much of it as she was nearing the end of her therapy however these symptoms continued to worsen and she informed our office.  At that time around 10/20/2022, amikacin was discontinued.  Referral was made to audiology (after baseline audiology evaluation) and labs ordered by patient's primary care physician. Showing worsening anemia to 9.8 from 12 and ARNOL (creatinine of 1.8 from baseline 0.9). She does report some improvement since discontinuing amikacin.  Although her hearing continues to be affected, her energy is improved and her appetite is back.  She reports no concerns with urination.  And all-in-all has been doing better since discontinuing Amkacin.  She continues to have the PICC line in place.    12/15/2022:  Since her last visit, she reports some improvement. She has more energy and is more active. She continues to have episodes of  diarrhea which are not constant but are significant in terms of affecting her lifestyle. She followed up with audiology who reports her hearing loss is less likely related to Aminoglycoside however, given timing of worsening of hearing loss, it is likely that it contributed to that. She followed up with nephrology and her kidney function is recovering slowly, her GFR is at 41 most recently, she is due to repeat her labs in early January and follow up with nephrology as well as our office. Her repeat cultures collected 2022 have so far remained negative per my discussion with micro lab.     2023:  Kidney function improved with creatinine of 1.2 and GFR of 48. She is tolerating antimycobacterials better with no diarrhea since she started eating yogurt regularly. Currently without new concerns. She has negative AFB culture in 2022.      2023:  Kidney function appears to have stabilized with GFR 40-50 in the past 6 months. Patient has returned to her daily activity, she has no significant cough, no night sweats, no fevers, continues to have intermittent episodes of diarrhea but these have been better controlled. No new concerns, needs some refills on medication.     10/26/2023:  No new concerns.  Most recent kidney function appears to have returned to normal.  No fevers, no chills, cough is minimal, with minimal if any sputum production.  No weight loss, no night sweats, no chills.  She continues to have episodic diarrhea with the current medication.      Past Medical History:   Diagnosis Date    Anxiety     Arthritis     Depression     GERD (gastroesophageal reflux disease)     Hyperlipidemia     Hypertension         Past Surgical History:   Procedure Laterality Date    APPENDECTOMY       SECTION      ECTOPIC PREGNANCY SURGERY      FRACTURE SURGERY  2021    Wrist    FUNCTIONAL ENDOSCOPIC SINUS SURGERY (FESS) Bilateral     HERNIA REPAIR  1970s    MANDIBLE SURGERY Bilateral      "PERIPHERALLY INSERTED CENTRAL CATHETER INSERTION N/A 08/11/2022    Procedure: INSERTION, PICC;  Surgeon: Ismael Stroud MD;  Location: [facility] OR;  Service: Infectious Disease;  Laterality: N/A;    TUBAL LIGATION  05-    WRIST FRACTURE SURGERY          Social History     Socioeconomic History    Marital status:    Tobacco Use    Smoking status: Former     Current packs/day: 1.00     Average packs/day: 1 pack/day for 18.0 years (18.0 ttl pk-yrs)     Types: Cigarettes    Smokeless tobacco: Never    Tobacco comments:     Quit in 1992   Substance and Sexual Activity    Alcohol use: Not Currently     Comment: Rarely drink alcohol    Drug use: Not Currently    Sexual activity: Not Currently        Family History   Problem Relation Age of Onset    Asbestos Mother     Cancer Mother         Mesothelioma    Suicide Father     Depression Father         Suicide    Depression Brother         Suicide        Review of patient's allergies indicates:  No Known Allergies       There is no immunization history on file for this patient.     Review of Systems   All other systems reviewed and are negative.         Objective:      BP (!) 147/75 (BP Location: Right arm)   Pulse 75   Resp 18   Ht 5' 4" (1.626 m)   Wt 95.7 kg (211 lb)   SpO2 96%   BMI 36.22 kg/m²      Physical Exam  Constitutional:       Appearance: Normal appearance.   HENT:      Head: Normocephalic and atraumatic.      Mouth/Throat:      Pharynx: No oropharyngeal exudate or posterior oropharyngeal erythema.   Eyes:      Extraocular Movements: Extraocular movements intact.      Pupils: Pupils are equal, round, and reactive to light.   Cardiovascular:      Rate and Rhythm: Normal rate and regular rhythm.      Heart sounds: No murmur heard.  Pulmonary:      Effort: No respiratory distress.      Breath sounds: No wheezing, rhonchi or rales.   Abdominal:      General: Bowel sounds are normal. There is no distension.      Palpations: Abdomen is soft.      " Tenderness: There is no abdominal tenderness. There is no right CVA tenderness or left CVA tenderness.   Musculoskeletal:         General: No swelling or tenderness.      Cervical back: Neck supple. No rigidity or tenderness.   Lymphadenopathy:      Cervical: No cervical adenopathy.   Skin:     Findings: No lesion or rash.   Neurological:      General: No focal deficit present.      Mental Status: She is alert and oriented to person, place, and time. Mental status is at baseline.      Cranial Nerves: No cranial nerve deficit.      Motor: No weakness.   Psychiatric:         Mood and Affect: Mood normal.         Behavior: Behavior normal.            Assessment:       Problem List Items Addressed This Visit          ID    Pulmonary Mycobacterium avium complex (MAC) infection - Primary    Relevant Orders    CT Chest Without Contrast    SAE (mycobacterium avium-intracellulare)    Relevant Orders    CT Chest Without Contrast                Plan:       -Sputum cultures from 04/2022 and 06/2022 remain positive for MAC despite daily dosing regimen  -Completed three-month course of amikacin  -Repeat cultures collected on 11/21/2022 did not have any mycobacterial growth  -Kidney function stabilized and on most recent check, appears to have been back to normal  -Hearing aids have been successful in helping her hearing loss    -today, patient completes a year of azithromycin, ethambutol, rifampin after completing initial 3 months of aminoglycosides on 10/2022  -Encouraged continued hydration and avoiding other nephrotoxins  -repeat a CT scan done on 05/29/2023 with stable findings of tree in bud and bronchiectasis  -will therefore discontinue antimycobacterial regimen and monitor clinically  -discussed risk of recurrence after completion  -Will need repeat CT scan in three-month    No follow-ups on file.    35 minutes of total time spent on the encounter, which includes face to face time and non-face to face time preparing to  see the patient (eg, review of tests), Obtaining and/or reviewing separately obtained history, Documenting clinical information in the electronic or other health record, Independently interpreting results (not separately reported) and communicating results to the patient/family/caregiver, or Care coordination (not separately reported).

## 2024-01-04 ENCOUNTER — HOSPITAL ENCOUNTER (OUTPATIENT)
Dept: RADIOLOGY | Facility: HOSPITAL | Age: 69
Discharge: HOME | End: 2024-01-04
Attending: GENERAL PRACTICE
Payer: MEDICARE

## 2024-01-04 DIAGNOSIS — A31.0 MAI (MYCOBACTERIUM AVIUM-INTRACELLULARE): ICD-10-CM

## 2024-01-04 DIAGNOSIS — A31.0 PULMONARY MYCOBACTERIUM AVIUM COMPLEX (MAC) INFECTION: ICD-10-CM

## 2024-01-04 PROCEDURE — 71250 CT THORAX DX C-: CPT | Mod: TC

## 2024-01-30 ENCOUNTER — OFFICE VISIT (OUTPATIENT)
Dept: INFECTIOUS DISEASES | Facility: CLINIC | Age: 69
End: 2024-01-30
Payer: MEDICARE

## 2024-01-30 VITALS
TEMPERATURE: 98 F | HEIGHT: 64 IN | DIASTOLIC BLOOD PRESSURE: 75 MMHG | WEIGHT: 210 LBS | BODY MASS INDEX: 35.85 KG/M2 | SYSTOLIC BLOOD PRESSURE: 137 MMHG | OXYGEN SATURATION: 95 % | RESPIRATION RATE: 18 BRPM | HEART RATE: 75 BPM

## 2024-01-30 DIAGNOSIS — A31.0 MAI (MYCOBACTERIUM AVIUM-INTRACELLULARE): ICD-10-CM

## 2024-01-30 DIAGNOSIS — A31.0 PULMONARY MYCOBACTERIUM AVIUM COMPLEX (MAC) INFECTION: ICD-10-CM

## 2024-01-30 DIAGNOSIS — N18.9 CHRONIC KIDNEY DISEASE, UNSPECIFIED CKD STAGE: Primary | ICD-10-CM

## 2024-01-30 DIAGNOSIS — R05.9 COUGH, UNSPECIFIED TYPE: ICD-10-CM

## 2024-01-30 PROCEDURE — 99215 OFFICE O/P EST HI 40 MIN: CPT | Mod: S$PBB,,, | Performed by: GENERAL PRACTICE

## 2024-01-30 PROCEDURE — 99999 PR PBB SHADOW E&M-EST. PATIENT-LVL IV: CPT | Mod: PBBFAC,,, | Performed by: GENERAL PRACTICE

## 2024-01-30 PROCEDURE — 99214 OFFICE O/P EST MOD 30 MIN: CPT | Mod: PBBFAC | Performed by: GENERAL PRACTICE

## 2024-01-30 NOTE — PROGRESS NOTES
Subjective:       Patient ID: Carmen Mckinley 69 y.o.     Chief Complaint:   Chief Complaint   Patient presents with    MAC    Follow-up        HPI:   1/11/2022:  66-year-old female patient known to have past medical history of pulmonary SAE, diagnosed in January 2021 presents for follow-up.   The patient had been on 3 times weekly azithromycin, ethambutol, rifampin from January 2021 up until December 2021.  She had significant improvement in her imaging between January 2021 and April 2021.  However her sputum AFB cultures have remained positive up until most recently in September 2021.  Patient contacted our office again in December 2021 after noticing her cultures remained positive.  At that point, I had multiple discussions with the patient as documented in the chart prior to today's visit, and switched her medication regimen to daily administration of rifampin, ethambutol, azithromycin.  Most recent susceptibility testing shows isolate is still sensitive to macrolides.   She was also experiencing side effects from the rifampin intermittent dosing, and the shape of flulike symptoms.  Since switching to daily dosing of the medication, she reports that her symptoms have subsided.  She notes ongoing cough however no significant changes in its frequency and no increase in sputum production.  She denies any weight loss, no fevers.  No shortness of breath.  She also denies any abdominal pain, no diarrhea, no changes in the urine color or in the stool color.  She denies any fatigue and reports normal appetite.  Most recent blood work done 12/29/2021, showing mild hyperbilirubinemia.  Otherwise normal LFTs. [1]    03/03/2022:   Patient presents today for follow-up.  She reports her cough is minimal and does not have any shortness of breath.  No fevers, no chills, no weight loss, no night sweats.  At our last visit we had paged her regimen from intermittent dosing to daily dosing of azithromycin, rifampin, ethambutol given  that she has not cleared her sputum cultures yet despite about a year of therapy.  She reports good tolerability to the antimicrobials.    06/02/2022:  No fevers, no chills, continues to have a mild cough especially in the morning. Otherwise no weight loss and continues with activities of daily living. She is taking Rifampin, Azithromycin, Ethambutol daily at this time. Her most recent cultures collected 03/2022 remain positive after 3 months on daily regimen. Repeat CT scan stable from prior.    07/28/2022  Presents for follow up. Had COVID since last visit but only mild symptoms. Cx sputum from 06/2022 and 04/2022 remain positive for SAE despite 6 months of the daily dosing regimen. Susceptibilities from 04/2022 with no resistance to Macrolides and S to Aminoglycosides. Otherwise clinically feels well, no fevers, no chills.     11/16/2022:  Today for follow-up.  Unfortunately she has developed toxicity from her amikacin.  She reports that about 7-10 days prior to notifying us, she had started noticing some increased fatigue as well as decreased hearing acuity.  Initially did not think much of it as she was nearing the end of her therapy however these symptoms continued to worsen and she informed our office.  At that time around 10/20/2022, amikacin was discontinued.  Referral was made to audiology (after baseline audiology evaluation) and labs ordered by patient's primary care physician. Showing worsening anemia to 9.8 from 12 and ARNOL (creatinine of 1.8 from baseline 0.9). She does report some improvement since discontinuing amikacin.  Although her hearing continues to be affected, her energy is improved and her appetite is back.  She reports no concerns with urination.  And all-in-all has been doing better since discontinuing Amkacin.  She continues to have the PICC line in place.    12/15/2022:  Since her last visit, she reports some improvement. She has more energy and is more active. She continues to have  episodes of diarrhea which are not constant but are significant in terms of affecting her lifestyle. She followed up with audiology who reports her hearing loss is less likely related to Aminoglycoside however, given timing of worsening of hearing loss, it is likely that it contributed to that. She followed up with nephrology and her kidney function is recovering slowly, her GFR is at 41 most recently, she is due to repeat her labs in early January and follow up with nephrology as well as our office. Her repeat cultures collected 11/21/2022 have so far remained negative per my discussion with micro lab.     03/21/2023:  Kidney function improved with creatinine of 1.2 and GFR of 48. She is tolerating antimycobacterials better with no diarrhea since she started eating yogurt regularly. Currently without new concerns. She has negative AFB culture in 11/2022.      06/22/2023:  Kidney function appears to have stabilized with GFR 40-50 in the past 6 months. Patient has returned to her daily activity, she has no significant cough, no night sweats, no fevers, continues to have intermittent episodes of diarrhea but these have been better controlled. No new concerns, needs some refills on medication.     10/26/2023:  No new concerns.  Most recent kidney function appears to have returned to normal.  No fevers, no chills, cough is minimal, with minimal if any sputum production.  No weight loss, no night sweats, no chills.  She continues to have episodic diarrhea with the current medication.    01/30/2024:  Patient had repeat CT scan of the chest earlier this month after about 3 months off antimicrobials.  Unfortunately, this has shown some progression of disease and new infiltrates especially at the lower lobes bilaterally.  This was compared to a CT scan done back in 05/2023.  In addition, the patient reports that she has starting to have sputum production which is greenish with some thick material it, and cough that has been  "more persistent at this time.  No significant changes in her shortness of.  Denies any other respiratory symptoms.  No fevers, no chills, no weight loss, no night sweats.  Patient notes no change in her bowel habits, no changes in her urinary habits.    Past Medical History:   Diagnosis Date    Anxiety     Arthritis     Depression     GERD (gastroesophageal reflux disease)     Hyperlipidemia     Hypertension         Past Surgical History:   Procedure Laterality Date    APPENDECTOMY       SECTION      ECTOPIC PREGNANCY SURGERY      FRACTURE SURGERY  2021    Wrist    FUNCTIONAL ENDOSCOPIC SINUS SURGERY (FESS) Bilateral     HERNIA REPAIR  1970s    MANDIBLE SURGERY Bilateral     PERIPHERALLY INSERTED CENTRAL CATHETER INSERTION N/A 2022    Procedure: INSERTION, PICC;  Surgeon: Ismael Stroud MD;  Location: [facility] OR;  Service: Infectious Disease;  Laterality: N/A;    TUBAL LIGATION  1989    WRIST FRACTURE SURGERY          Social History     Socioeconomic History    Marital status:    Tobacco Use    Smoking status: Former     Current packs/day: 1.00     Average packs/day: 1 pack/day for 18.0 years (18.0 ttl pk-yrs)     Types: Cigarettes    Smokeless tobacco: Never    Tobacco comments:     Quit in    Substance and Sexual Activity    Alcohol use: Not Currently     Comment: Rarely drink alcohol    Drug use: Not Currently    Sexual activity: Not Currently        Family History   Problem Relation Age of Onset    Asbestos Mother     Cancer Mother         Mesothelioma    Suicide Father     Depression Father         Suicide    Depression Brother         Suicide        Review of patient's allergies indicates:  No Known Allergies       There is no immunization history on file for this patient.     Review of Systems   All other systems reviewed and are negative.         Objective:      /75 (BP Location: Left arm)   Pulse 75   Temp 97.7 °F (36.5 °C) (Oral)   Resp 18   Ht 5' 4" (1.626 m)   Wt " 95.3 kg (210 lb)   SpO2 95%   BMI 36.05 kg/m²      Physical Exam  Constitutional:       Appearance: Normal appearance.   HENT:      Head: Normocephalic and atraumatic.      Mouth/Throat:      Pharynx: No oropharyngeal exudate or posterior oropharyngeal erythema.   Eyes:      Extraocular Movements: Extraocular movements intact.      Pupils: Pupils are equal, round, and reactive to light.   Cardiovascular:      Rate and Rhythm: Normal rate and regular rhythm.      Heart sounds: No murmur heard.  Pulmonary:      Effort: No respiratory distress.      Breath sounds: No wheezing, rhonchi or rales.   Abdominal:      General: Bowel sounds are normal. There is no distension.      Palpations: Abdomen is soft.      Tenderness: There is no abdominal tenderness. There is no right CVA tenderness or left CVA tenderness.   Musculoskeletal:         General: No swelling or tenderness.      Cervical back: Neck supple. No rigidity or tenderness.   Lymphadenopathy:      Cervical: No cervical adenopathy.   Skin:     Findings: No lesion or rash.   Neurological:      General: No focal deficit present.      Mental Status: She is alert and oriented to person, place, and time. Mental status is at baseline.      Cranial Nerves: No cranial nerve deficit.      Motor: No weakness.   Psychiatric:         Mood and Affect: Mood normal.         Behavior: Behavior normal.            Assessment:       Problem List Items Addressed This Visit          Renal/    CKD (chronic kidney disease) - Primary    Relevant Orders    Comprehensive Metabolic Panel    CBC Auto Differential    Respiratory Culture       ID    Pulmonary Mycobacterium avium complex (MAC) infection    Relevant Orders    Comprehensive Metabolic Panel    CBC Auto Differential    Mycobacteria and Nocardia Culture    Mycobacteria and Nocardia Culture    Mycobacteria and Nocardia Culture    SAE (mycobacterium avium-intracellulare)    Relevant Orders    Comprehensive Metabolic Panel    CBC  Auto Differential     Other Visit Diagnoses       Cough, unspecified type        Relevant Orders    Respiratory Culture                       Plan:       -Sputum cultures from 04/2022 and 06/2022 remain positive for MAC despite daily dosing regimen  -Completed three-month course of amikacin  -Repeat cultures collected on 11/21/2022 did not have any mycobacterial growth  -Kidney function stabilized and on most recent check, appears to have been back to normal on 09/28/2023  -Hearing aids have been successful in helping her hearing loss    -at the previous visit on 10/26/2023, the patient had significantly improved, her cough has resolved, no sputum production., patient completed at that time a year of azithromycin, ethambutol, rifampin after completing initial 3 months of aminoglycosides on 10/2022  -antibiotics were discontinued  -repeat a CT scan done on 01/04/2024 shows new consolidative opacities in the lower lobes compared to the 1 on 05/29/2023 which had stable findings of tree in bud and bronchiectasis compared to the CT prior  -these findings along with the fact that the patient now has recurrence of her cough, sputum production, greenish color, some thick material in her sputum, is concerning for recurrence of the infection.  -will repeat labs today for baseline, will also obtain aerobic and anaerobic respiratory cultures, in addition to 3 AFB cultures the sputum his paced by 24 hours  -based on these results, will likely need to restart antimycobacterial therapy while waiting for AFB cultures to finalize.  I discussed with the patient that we can also hold off on restarting therapy and wait for the cultures to come back, given the progress on the CT scan, the recurrence of the symptoms, patient was more in favor of restarting medication if appropriate  -may need bronchoscopy for better evaluation of the specimens, will await results of the above 1st  -again discussed with the patient, at this time we may  need to change strategies, 1 option would be to attempt inhaled liposomal amikacin versus attempt addition of clofazimine to regimen.  Given her previous reaction with nephrotoxicity and ototoxicity, I am reluctant to attempt any form of aminoglycosides again, we will re-evaluate once we have our labs and cultures back      Follow up in about 4 weeks (around 2/27/2024).    45 minutes of total time spent on the encounter, which includes face to face time and non-face to face time preparing to see the patient (eg, review of tests), Obtaining and/or reviewing separately obtained history, Documenting clinical information in the electronic or other health record, Independently interpreting results (not separately reported) and communicating results to the patient/family/caregiver, or Care coordination (not separately reported).

## 2024-01-31 ENCOUNTER — LAB VISIT (OUTPATIENT)
Dept: LAB | Facility: HOSPITAL | Age: 69
End: 2024-01-31
Attending: GENERAL PRACTICE
Payer: MEDICARE

## 2024-01-31 DIAGNOSIS — N18.9 CHRONIC KIDNEY DISEASE, UNSPECIFIED CKD STAGE: ICD-10-CM

## 2024-01-31 DIAGNOSIS — A31.0 MAI (MYCOBACTERIUM AVIUM-INTRACELLULARE): ICD-10-CM

## 2024-01-31 DIAGNOSIS — A31.0 PULMONARY MYCOBACTERIUM AVIUM COMPLEX (MAC) INFECTION: ICD-10-CM

## 2024-01-31 LAB
ALBUMIN SERPL-MCNC: 3.8 G/DL (ref 3.4–4.8)
ALBUMIN/GLOB SERPL: 1.1 RATIO (ref 1.1–2)
ALP SERPL-CCNC: 37 UNIT/L (ref 40–150)
ALT SERPL-CCNC: 9 UNIT/L (ref 0–55)
AST SERPL-CCNC: 16 UNIT/L (ref 5–34)
BASOPHILS # BLD AUTO: 0.04 X10(3)/MCL
BASOPHILS NFR BLD AUTO: 0.6 %
BILIRUB SERPL-MCNC: 0.3 MG/DL
BUN SERPL-MCNC: 25.1 MG/DL (ref 9.8–20.1)
CALCIUM SERPL-MCNC: 9.6 MG/DL (ref 8.4–10.2)
CHLORIDE SERPL-SCNC: 102 MMOL/L (ref 98–107)
CO2 SERPL-SCNC: 26 MMOL/L (ref 23–31)
CREAT SERPL-MCNC: 1.34 MG/DL (ref 0.55–1.02)
EOSINOPHIL # BLD AUTO: 0.37 X10(3)/MCL (ref 0–0.9)
EOSINOPHIL NFR BLD AUTO: 5.6 %
ERYTHROCYTE [DISTWIDTH] IN BLOOD BY AUTOMATED COUNT: 12.8 % (ref 11.5–17)
GFR SERPLBLD CREATININE-BSD FMLA CKD-EPI: 42 MLS/MIN/1.73/M2
GLOBULIN SER-MCNC: 3.4 GM/DL (ref 2.4–3.5)
GLUCOSE SERPL-MCNC: 125 MG/DL (ref 82–115)
HCT VFR BLD AUTO: 35.8 % (ref 37–47)
HGB BLD-MCNC: 11.5 G/DL (ref 12–16)
IMM GRANULOCYTES # BLD AUTO: 0.02 X10(3)/MCL (ref 0–0.04)
IMM GRANULOCYTES NFR BLD AUTO: 0.3 %
LYMPHOCYTES # BLD AUTO: 1.01 X10(3)/MCL (ref 0.6–4.6)
LYMPHOCYTES NFR BLD AUTO: 15.3 %
MCH RBC QN AUTO: 30.3 PG (ref 27–31)
MCHC RBC AUTO-ENTMCNC: 32.1 G/DL (ref 33–36)
MCV RBC AUTO: 94.2 FL (ref 80–94)
MONOCYTES # BLD AUTO: 0.65 X10(3)/MCL (ref 0.1–1.3)
MONOCYTES NFR BLD AUTO: 9.9 %
NEUTROPHILS # BLD AUTO: 4.49 X10(3)/MCL (ref 2.1–9.2)
NEUTROPHILS NFR BLD AUTO: 68.3 %
NRBC BLD AUTO-RTO: 0 %
PLATELET # BLD AUTO: 315 X10(3)/MCL (ref 130–400)
PMV BLD AUTO: 10.4 FL (ref 7.4–10.4)
POTASSIUM SERPL-SCNC: 4.2 MMOL/L (ref 3.5–5.1)
PROT SERPL-MCNC: 7.2 GM/DL (ref 5.8–7.6)
RBC # BLD AUTO: 3.8 X10(6)/MCL (ref 4.2–5.4)
SODIUM SERPL-SCNC: 138 MMOL/L (ref 136–145)
WBC # SPEC AUTO: 6.58 X10(3)/MCL (ref 4.5–11.5)

## 2024-01-31 PROCEDURE — 85025 COMPLETE CBC W/AUTO DIFF WBC: CPT

## 2024-01-31 PROCEDURE — 36415 COLL VENOUS BLD VENIPUNCTURE: CPT

## 2024-01-31 PROCEDURE — 80053 COMPREHEN METABOLIC PANEL: CPT

## 2024-02-05 ENCOUNTER — LAB VISIT (OUTPATIENT)
Dept: LAB | Facility: HOSPITAL | Age: 69
End: 2024-02-05
Attending: GENERAL PRACTICE
Payer: MEDICARE

## 2024-02-05 ENCOUNTER — TELEPHONE (OUTPATIENT)
Dept: INFECTIOUS DISEASES | Facility: CLINIC | Age: 69
End: 2024-02-05
Payer: MEDICARE

## 2024-02-05 DIAGNOSIS — A31.0 MAI (MYCOBACTERIUM AVIUM-INTRACELLULARE): ICD-10-CM

## 2024-02-05 DIAGNOSIS — A31.0 MAI (MYCOBACTERIUM AVIUM-INTRACELLULARE): Primary | ICD-10-CM

## 2024-02-05 PROCEDURE — 87077 CULTURE AEROBIC IDENTIFY: CPT

## 2024-02-05 PROCEDURE — 87116 MYCOBACTERIA CULTURE: CPT

## 2024-02-05 NOTE — TELEPHONE ENCOUNTER
"Pt called she is at the lab with her sputum sample but no orders in epic. Reordered in EPIC as "clinic" collect. Epic cancels labs ordered as "home" collect. Explained and apologized to pt.       "

## 2024-02-06 ENCOUNTER — LAB VISIT (OUTPATIENT)
Dept: LAB | Facility: HOSPITAL | Age: 69
End: 2024-02-06
Attending: GENERAL PRACTICE
Payer: MEDICARE

## 2024-02-06 DIAGNOSIS — A31.0 MAI (MYCOBACTERIUM AVIUM-INTRACELLULARE): ICD-10-CM

## 2024-02-06 PROCEDURE — 87116 MYCOBACTERIA CULTURE: CPT

## 2024-02-08 ENCOUNTER — LAB VISIT (OUTPATIENT)
Dept: LAB | Facility: HOSPITAL | Age: 69
End: 2024-02-08
Attending: GENERAL PRACTICE
Payer: MEDICARE

## 2024-02-08 DIAGNOSIS — J15.1 PNEUMONIA DUE TO PSEUDOMONAS SPECIES, UNSPECIFIED LATERALITY, UNSPECIFIED PART OF LUNG: Primary | ICD-10-CM

## 2024-02-08 DIAGNOSIS — A31.0 MAI (MYCOBACTERIUM AVIUM-INTRACELLULARE): ICD-10-CM

## 2024-02-08 LAB
BACTERIA SPEC CULT: ABNORMAL
GRAM STN SPEC: ABNORMAL

## 2024-02-08 PROCEDURE — 87116 MYCOBACTERIA CULTURE: CPT

## 2024-02-08 PROCEDURE — 87186 SC STD MICRODIL/AGAR DIL: CPT | Mod: 91

## 2024-02-08 RX ORDER — CIPROFLOXACIN 500 MG/1
500 TABLET ORAL EVERY 12 HOURS
Qty: 14 TABLET | Refills: 0 | Status: SHIPPED | OUTPATIENT
Start: 2024-02-08 | End: 2024-05-29 | Stop reason: ALTCHOICE

## 2024-02-08 NOTE — PROGRESS NOTES
Cough, green sputum production, new area of consolidation on the CT scan, positive sputum culture for Pseudomonas.  Will start course of Ciprofloxacin 500mg PO q12h for 7 days and assess clinical response. Discussed with patient. AFB cultures are pending     Ismael Stroud MD  Infectious Disease  [facility]

## 2024-02-09 ENCOUNTER — LAB VISIT (OUTPATIENT)
Dept: LAB | Facility: HOSPITAL | Age: 69
End: 2024-02-09
Payer: MEDICARE

## 2024-02-09 DIAGNOSIS — N17.9 AKI (ACUTE KIDNEY INJURY): ICD-10-CM

## 2024-02-09 DIAGNOSIS — N28.9 RENAL INSUFFICIENCY: ICD-10-CM

## 2024-02-09 LAB
ALBUMIN SERPL-MCNC: 3.7 G/DL (ref 3.4–4.8)
ALBUMIN/GLOB SERPL: 1.1 RATIO (ref 1.1–2)
ALP SERPL-CCNC: 36 UNIT/L (ref 40–150)
ALT SERPL-CCNC: 11 UNIT/L (ref 0–55)
APPEARANCE UR: CLEAR
AST SERPL-CCNC: 16 UNIT/L (ref 5–34)
BACTERIA #/AREA URNS AUTO: ABNORMAL /HPF
BASOPHILS # BLD AUTO: 0.04 X10(3)/MCL
BASOPHILS NFR BLD AUTO: 0.6 %
BILIRUB SERPL-MCNC: 0.3 MG/DL
BILIRUB UR QL STRIP.AUTO: NEGATIVE
BUN SERPL-MCNC: 20.1 MG/DL (ref 9.8–20.1)
CALCIUM SERPL-MCNC: 9 MG/DL (ref 8.4–10.2)
CHLORIDE SERPL-SCNC: 104 MMOL/L (ref 98–107)
CO2 SERPL-SCNC: 23 MMOL/L (ref 23–31)
COLOR UR AUTO: COLORLESS
CREAT SERPL-MCNC: 1.18 MG/DL (ref 0.55–1.02)
CREAT UR-MCNC: 16.1 MG/DL (ref 45–106)
EOSINOPHIL # BLD AUTO: 0.36 X10(3)/MCL (ref 0–0.9)
EOSINOPHIL NFR BLD AUTO: 5.3 %
ERYTHROCYTE [DISTWIDTH] IN BLOOD BY AUTOMATED COUNT: 12.8 % (ref 11.5–17)
GFR SERPLBLD CREATININE-BSD FMLA CKD-EPI: 49 MLS/MIN/1.73/M2
GLOBULIN SER-MCNC: 3.3 GM/DL (ref 2.4–3.5)
GLUCOSE SERPL-MCNC: 114 MG/DL (ref 82–115)
GLUCOSE UR QL STRIP.AUTO: NORMAL
HCT VFR BLD AUTO: 34.9 % (ref 37–47)
HGB BLD-MCNC: 11.2 G/DL (ref 12–16)
IMM GRANULOCYTES # BLD AUTO: 0.03 X10(3)/MCL (ref 0–0.04)
IMM GRANULOCYTES NFR BLD AUTO: 0.4 %
KETONES UR QL STRIP.AUTO: NEGATIVE
LEUKOCYTE ESTERASE UR QL STRIP.AUTO: 25
LYMPHOCYTES # BLD AUTO: 1 X10(3)/MCL (ref 0.6–4.6)
LYMPHOCYTES NFR BLD AUTO: 14.6 %
MCH RBC QN AUTO: 30.3 PG (ref 27–31)
MCHC RBC AUTO-ENTMCNC: 32.1 G/DL (ref 33–36)
MCV RBC AUTO: 94.3 FL (ref 80–94)
MONOCYTES # BLD AUTO: 0.63 X10(3)/MCL (ref 0.1–1.3)
MONOCYTES NFR BLD AUTO: 9.2 %
NEUTROPHILS # BLD AUTO: 4.77 X10(3)/MCL (ref 2.1–9.2)
NEUTROPHILS NFR BLD AUTO: 69.9 %
NITRITE UR QL STRIP.AUTO: NEGATIVE
NRBC BLD AUTO-RTO: 0 %
PH UR STRIP.AUTO: 5 [PH]
PLATELET # BLD AUTO: 332 X10(3)/MCL (ref 130–400)
PMV BLD AUTO: 9.8 FL (ref 7.4–10.4)
POTASSIUM SERPL-SCNC: 4.3 MMOL/L (ref 3.5–5.1)
PROT SERPL-MCNC: 7 GM/DL (ref 5.8–7.6)
PROT UR QL STRIP.AUTO: NEGATIVE
PROT UR STRIP-MCNC: <6.8 MG/DL
RBC # BLD AUTO: 3.7 X10(6)/MCL (ref 4.2–5.4)
RBC #/AREA URNS AUTO: ABNORMAL /HPF
RBC UR QL AUTO: NEGATIVE
SODIUM SERPL-SCNC: 135 MMOL/L (ref 136–145)
SP GR UR STRIP.AUTO: 1 (ref 1–1.03)
SQUAMOUS #/AREA URNS LPF: ABNORMAL /HPF
UROBILINOGEN UR STRIP-ACNC: NORMAL
WBC # SPEC AUTO: 6.83 X10(3)/MCL (ref 4.5–11.5)
WBC #/AREA URNS AUTO: ABNORMAL /HPF

## 2024-02-09 PROCEDURE — 82570 ASSAY OF URINE CREATININE: CPT

## 2024-02-09 PROCEDURE — 85025 COMPLETE CBC W/AUTO DIFF WBC: CPT

## 2024-02-09 PROCEDURE — 81001 URINALYSIS AUTO W/SCOPE: CPT

## 2024-02-09 PROCEDURE — 36415 COLL VENOUS BLD VENIPUNCTURE: CPT

## 2024-02-09 PROCEDURE — 80053 COMPREHEN METABOLIC PANEL: CPT

## 2024-02-15 ENCOUNTER — OFFICE VISIT (OUTPATIENT)
Dept: NEPHROLOGY | Facility: CLINIC | Age: 69
End: 2024-02-15
Payer: MEDICARE

## 2024-02-15 VITALS
DIASTOLIC BLOOD PRESSURE: 68 MMHG | TEMPERATURE: 98 F | HEART RATE: 71 BPM | RESPIRATION RATE: 19 BRPM | HEIGHT: 64 IN | OXYGEN SATURATION: 96 % | WEIGHT: 206.63 LBS | SYSTOLIC BLOOD PRESSURE: 136 MMHG | BODY MASS INDEX: 35.28 KG/M2

## 2024-02-15 DIAGNOSIS — N17.9 AKI (ACUTE KIDNEY INJURY): Primary | ICD-10-CM

## 2024-02-15 PROCEDURE — 99999 PR PBB SHADOW E&M-EST. PATIENT-LVL IV: CPT | Mod: PBBFAC,,, | Performed by: INTERNAL MEDICINE

## 2024-02-15 PROCEDURE — 99214 OFFICE O/P EST MOD 30 MIN: CPT | Mod: PBBFAC | Performed by: INTERNAL MEDICINE

## 2024-02-15 PROCEDURE — 99213 OFFICE O/P EST LOW 20 MIN: CPT | Mod: S$PBB,,, | Performed by: INTERNAL MEDICINE

## 2024-02-15 RX ORDER — APIXABAN 5 MG/1
5 TABLET, FILM COATED ORAL 2 TIMES DAILY
COMMUNITY
Start: 2024-02-14 | End: 2024-05-23

## 2024-02-15 NOTE — PROGRESS NOTES
[facility] [place] [facility] Ambulatory Progress Note      HPI  Carmen Mckinley, 69 y.o. female, presents to office for a follow up visit for CKD 2 status post ARNOL.  Patient feels fine denies any major complaints.    Patient denies taking NSAIDs or new antibiotics. Also denies recent episode of dehydration, diarrhea, vomiting, acute illness, hospitalization, recent angiograms or exposure to IV radiocontrast.        Medical Diagnoses:   Past Medical History:   Diagnosis Date    Anxiety     Arthritis     Depression     GERD (gastroesophageal reflux disease)     Hyperlipidemia     Hypertension      Patient Active Problem List   Diagnosis    Pulmonary Mycobacterium avium complex (MAC) infection    Chronic cough    Depressive disorder    Gastroesophageal reflux disease    Hypercholesterolemia    Nocardiosis    Primary hypertension    Pulmonary emphysema    SAE (mycobacterium avium-intracellulare)    Ototoxicity of both ears    Bronchiectasis without complication    CKD (chronic kidney disease)       Surgical History:   Past Surgical History:   Procedure Laterality Date    APPENDECTOMY       SECTION      ECTOPIC PREGNANCY SURGERY      FRACTURE SURGERY  2021    Wrist    FUNCTIONAL ENDOSCOPIC SINUS SURGERY (FESS) Bilateral     HERNIA REPAIR  1970s    MANDIBLE SURGERY Bilateral     PERIPHERALLY INSERTED CENTRAL CATHETER INSERTION N/A 2022    Procedure: INSERTION, PICC;  Surgeon: Ismael Stroud MD;  Location: [facility];  Service: Infectious Disease;  Laterality: N/A;    TUBAL LIGATION  1989    WRIST FRACTURE SURGERY         Family History:   Family History   Problem Relation Age of Onset    Asbestos Mother     Cancer Mother         Mesothelioma    Suicide Father     Depression Father         Suicide    Depression Brother         Suicide       Social History:   Social History     Tobacco Use    Smoking status: Former     Current packs/day: 1.00     Average packs/day: 1 pack/day for 18.0 years (18.0 ttl pk-yrs)     Types:  Cigarettes    Smokeless tobacco: Never    Tobacco comments:     Quit in 1992   Substance Use Topics    Alcohol use: Not Currently     Comment: Rarely drink alcohol       Allergies:  Review of patient's allergies indicates:  No Known Allergies    Medications:    Current Outpatient Medications:     calcium carbonate (CALCIUM 500 ORAL), Take 500 mg by mouth Daily., Disp: , Rfl:     cetirizine (ZYRTEC) 10 MG tablet, , Disp: , Rfl:     ciprofloxacin HCl (CIPRO) 500 MG tablet, Take 1 tablet (500 mg total) by mouth every 12 (twelve) hours., Disp: 14 tablet, Rfl: 0    ELIQUIS 5 mg Tab, Take 5 mg by mouth 2 (two) times daily., Disp: , Rfl:     fenofibrate (TRICOR) 145 MG tablet, Take 145 mg by mouth., Disp: , Rfl:     fish oil-dha-epa 1,200-144-216 mg Cap, Take by mouth., Disp: , Rfl:     folic acid (FOLVITE) 1 MG tablet, Take 1,000 mcg by mouth once daily., Disp: , Rfl:     glucosamine-chondroitin 500-400 mg tablet, Take 1 tablet by mouth once daily., Disp: , Rfl:     melatonin 10 mg Tab, Take by mouth., Disp: , Rfl:     propranoloL (INDERAL) 80 MG tablet, Take 80 mg by mouth once daily., Disp: , Rfl:     sertraline (ZOLOFT) 100 MG tablet, Take 100 mg by mouth once daily., Disp: , Rfl:        Review of Systems:    Constitutional: Denies fever, fatigue, generalized weakness  Skin: Denies wounds, no rashes, no itching, no new skin lesions  Respiratory:  Denies cough, shortness of breath, or wheezing  Cardiovascular: Denies chest pain, palpitations, or swelling  Gastrointestional: Denies abdominal pain, nausea, vomiting, diarrhea, or constipation  Genitourinary: Denies dysuria, hematuria, foamy urine, or incontinence; reports able to empty bladder  Musculoskeletal: Denies back or flank pain  Neurological: Denies headaches, dizziness, paresthesias, tremors or focal weakness      Vital Signs:  /68 (BP Location: Left arm, Patient Position: Sitting, BP Method: Medium (Automatic))   Pulse 71   Temp 98.2 °F (36.8 °C) (Oral)   " Resp 19   Ht 5' 4.02" (1.626 m)   Wt 93.7 kg (206 lb 9.6 oz)   SpO2 96%   BMI 35.45 kg/m²   Body mass index is 35.45 kg/m².      Physical Exam:    General: no acute distress, awake, alert  Eyes: conjunctiva clear, eyelids without swelling  HENT: atraumatic, oropharynx and nasal mucosa patent  Neck: supple, trache midline, no JVD  Respiratory: equal, unlabored, clear to auscultation A/P  Cardiovascular: RRR without murmur or rub  Edema: none  Gastrointestinal: soft, non-tender, non-distended; positive bowel sounds; no masses to palpation; no ascites  Genitourinary: no CVA tenderness upon palpation  Musculoskeletal: ROM without new limitation or discomfort  Integumentary: warm, dry; no rashes, wounds, or skin lesions  Neurological: oriented x4, appropriate, no acute deficits; no asterixis      Labs:        Component Value Date/Time     (L) 02/09/2024 1340     01/31/2024 1344    K 4.3 02/09/2024 1340    K 4.2 01/31/2024 1344    CO2 23 02/09/2024 1340    CO2 26 01/31/2024 1344    BUN 20.1 02/09/2024 1340    BUN 25.1 (H) 01/31/2024 1344    CREATININE 1.18 (H) 02/09/2024 1340    CREATININE 1.34 (H) 01/31/2024 1344    CREATININE 1.00 09/28/2023 1249    CREATININE 1.14 (H) 08/22/2023 1400    CALCIUM 9.0 02/09/2024 1340    CALCIUM 9.6 01/31/2024 1344    PHOS 3.6 03/01/2023 1123           Component Value Date/Time    WBC 6.83 02/09/2024 1340    WBC 6.58 01/31/2024 1344    HGB 11.2 (L) 02/09/2024 1340    HGB 11.5 (L) 01/31/2024 1344    HCT 34.9 (L) 02/09/2024 1340    HCT 35.8 (L) 01/31/2024 1344     02/09/2024 1340     01/31/2024 1344       Urine Creatinine   Date Value Ref Range Status   02/09/2024 16.1 (L) 45.0 - 106.0 mg/dL Final   08/22/2023 21.7 (L) 45.0 - 106.0 mg/dL Final       Urine Protein Level   Date Value Ref Range Status   02/09/2024 <6.8 mg/dL Final   08/22/2023 <6.8 mg/dL Final           Imaging:  Retroperitoneal Ultrasound:      Impression:    1. ARNOL (acute kidney injury)      "     Renal function recovered almost close to baseline she remains at CKD 2 but she is stable    Plan:  No change in renal management.  Labs in 6 months if stable labs and follow-up visit in a year if she remains stable we can discharge from renal practice       Jonathon Zayas      This note was created with the assistance of [employer] voice recognition software or phone dictation. There may be transcription errors as a result of using this technology however minimal. Effort has been made to assure accuracy of transcription but any obvious errors or omissions should be clarified with the author of the document.

## 2024-02-28 ENCOUNTER — OFFICE VISIT (OUTPATIENT)
Dept: INFECTIOUS DISEASES | Facility: CLINIC | Age: 69
End: 2024-02-28
Payer: MEDICARE

## 2024-02-28 VITALS
RESPIRATION RATE: 18 BRPM | BODY MASS INDEX: 35.71 KG/M2 | HEIGHT: 64 IN | HEART RATE: 70 BPM | SYSTOLIC BLOOD PRESSURE: 122 MMHG | DIASTOLIC BLOOD PRESSURE: 70 MMHG | WEIGHT: 209.19 LBS | TEMPERATURE: 98 F

## 2024-02-28 DIAGNOSIS — H93.8X3 OTOTOXICITY OF BOTH EARS: ICD-10-CM

## 2024-02-28 DIAGNOSIS — J47.9 BRONCHIECTASIS WITHOUT COMPLICATION: ICD-10-CM

## 2024-02-28 DIAGNOSIS — N18.9 CHRONIC KIDNEY DISEASE, UNSPECIFIED CKD STAGE: ICD-10-CM

## 2024-02-28 DIAGNOSIS — R05.3 CHRONIC COUGH: ICD-10-CM

## 2024-02-28 DIAGNOSIS — A31.0 MAI (MYCOBACTERIUM AVIUM-INTRACELLULARE): Primary | ICD-10-CM

## 2024-02-28 DIAGNOSIS — A31.0 PULMONARY MYCOBACTERIUM AVIUM COMPLEX (MAC) INFECTION: ICD-10-CM

## 2024-02-28 PROCEDURE — 99999 PR PBB SHADOW E&M-EST. PATIENT-LVL IV: CPT | Mod: PBBFAC,,, | Performed by: GENERAL PRACTICE

## 2024-02-28 PROCEDURE — 99214 OFFICE O/P EST MOD 30 MIN: CPT | Mod: PBBFAC | Performed by: GENERAL PRACTICE

## 2024-02-28 PROCEDURE — 99215 OFFICE O/P EST HI 40 MIN: CPT | Mod: S$PBB,,, | Performed by: GENERAL PRACTICE

## 2024-03-05 NOTE — PROGRESS NOTES
Subjective:       Patient ID: Carmen Mckinley 69 y.o.     Chief Complaint:   Chief Complaint   Patient presents with    MAC    Follow-up        HPI:   1/11/2022:  66-year-old female patient known to have past medical history of pulmonary SAE, diagnosed in January 2021 presents for follow-up.   The patient had been on 3 times weekly azithromycin, ethambutol, rifampin from January 2021 up until December 2021.  She had significant improvement in her imaging between January 2021 and April 2021.  However her sputum AFB cultures have remained positive up until most recently in September 2021.  Patient contacted our office again in December 2021 after noticing her cultures remained positive.  At that point, I had multiple discussions with the patient as documented in the chart prior to today's visit, and switched her medication regimen to daily administration of rifampin, ethambutol, azithromycin.  Most recent susceptibility testing shows isolate is still sensitive to macrolides.   She was also experiencing side effects from the rifampin intermittent dosing, and the shape of flulike symptoms.  Since switching to daily dosing of the medication, she reports that her symptoms have subsided.  She notes ongoing cough however no significant changes in its frequency and no increase in sputum production.  She denies any weight loss, no fevers.  No shortness of breath.  She also denies any abdominal pain, no diarrhea, no changes in the urine color or in the stool color.  She denies any fatigue and reports normal appetite.  Most recent blood work done 12/29/2021, showing mild hyperbilirubinemia.  Otherwise normal LFTs. [1]    03/03/2022:   Patient presents today for follow-up.  She reports her cough is minimal and does not have any shortness of breath.  No fevers, no chills, no weight loss, no night sweats.  At our last visit we had paged her regimen from intermittent dosing to daily dosing of azithromycin, rifampin, ethambutol given  that she has not cleared her sputum cultures yet despite about a year of therapy.  She reports good tolerability to the antimicrobials.    06/02/2022:  No fevers, no chills, continues to have a mild cough especially in the morning. Otherwise no weight loss and continues with activities of daily living. She is taking Rifampin, Azithromycin, Ethambutol daily at this time. Her most recent cultures collected 03/2022 remain positive after 3 months on daily regimen. Repeat CT scan stable from prior.    07/28/2022  Presents for follow up. Had COVID since last visit but only mild symptoms. Cx sputum from 06/2022 and 04/2022 remain positive for SAE despite 6 months of the daily dosing regimen. Susceptibilities from 04/2022 with no resistance to Macrolides and S to Aminoglycosides. Otherwise clinically feels well, no fevers, no chills.     11/16/2022:  Today for follow-up.  Unfortunately she has developed toxicity from her amikacin.  She reports that about 7-10 days prior to notifying us, she had started noticing some increased fatigue as well as decreased hearing acuity.  Initially did not think much of it as she was nearing the end of her therapy however these symptoms continued to worsen and she informed our office.  At that time around 10/20/2022, amikacin was discontinued.  Referral was made to audiology (after baseline audiology evaluation) and labs ordered by patient's primary care physician. Showing worsening anemia to 9.8 from 12 and ARNOL (creatinine of 1.8 from baseline 0.9). She does report some improvement since discontinuing amikacin.  Although her hearing continues to be affected, her energy is improved and her appetite is back.  She reports no concerns with urination.  And all-in-all has been doing better since discontinuing Amkacin.  She continues to have the PICC line in place.    12/15/2022:  Since her last visit, she reports some improvement. She has more energy and is more active. She continues to have  episodes of diarrhea which are not constant but are significant in terms of affecting her lifestyle. She followed up with audiology who reports her hearing loss is less likely related to Aminoglycoside however, given timing of worsening of hearing loss, it is likely that it contributed to that. She followed up with nephrology and her kidney function is recovering slowly, her GFR is at 41 most recently, she is due to repeat her labs in early January and follow up with nephrology as well as our office. Her repeat cultures collected 11/21/2022 have so far remained negative per my discussion with micro lab.     03/21/2023:  Kidney function improved with creatinine of 1.2 and GFR of 48. She is tolerating antimycobacterials better with no diarrhea since she started eating yogurt regularly. Currently without new concerns. She has negative AFB culture in 11/2022.      06/22/2023:  Kidney function appears to have stabilized with GFR 40-50 in the past 6 months. Patient has returned to her daily activity, she has no significant cough, no night sweats, no fevers, continues to have intermittent episodes of diarrhea but these have been better controlled. No new concerns, needs some refills on medication.     10/26/2023:  No new concerns.  Most recent kidney function appears to have returned to normal.  No fevers, no chills, cough is minimal, with minimal if any sputum production.  No weight loss, no night sweats, no chills.  She continues to have episodic diarrhea with the current medication.    01/30/2024:  Patient had repeat CT scan of the chest earlier this month after about 3 months off antimicrobials.  Unfortunately, this has shown some progression of disease and new infiltrates especially at the lower lobes bilaterally.  This was compared to a CT scan done back in 05/2023.  In addition, the patient reports that she has starting to have sputum production which is greenish with some thick material it, and cough that has been  more persistent at this time.  No significant changes in her shortness of.  Denies any other respiratory symptoms.  No fevers, no chills, no weight loss, no night sweats.  Patient notes no change in her bowel habits, no changes in her urinary habits.    2024:  Patient presents today for follow-up, we repeated patient's sputum cultures at the previous visit, including 3 AFB cultures.  Typical respiratory cultures however were positive for moderate Pseudomonas aeruginosa that was pansensitive.  We prescribed her a week of ciprofloxacin.  She reports improvement in her symptoms after completion of the course.  Reports improved cough, improved shortness of breath.  However she does have ongoing tickle with repetitive clearing of her throat.  Otherwise, no new concerning symptoms.      Past Medical History:   Diagnosis Date    Anxiety     Arthritis     Depression     GERD (gastroesophageal reflux disease)     Hyperlipidemia     Hypertension         Past Surgical History:   Procedure Laterality Date    APPENDECTOMY       SECTION      ECTOPIC PREGNANCY SURGERY      FRACTURE SURGERY  2021    Wrist    FUNCTIONAL ENDOSCOPIC SINUS SURGERY (FESS) Bilateral     HERNIA REPAIR  1970s    MANDIBLE SURGERY Bilateral     PERIPHERALLY INSERTED CENTRAL CATHETER INSERTION N/A 2022    Procedure: INSERTION, PICC;  Surgeon: Ismael Stroud MD;  Location: [facility];  Service: Infectious Disease;  Laterality: N/A;    TUBAL LIGATION  1989    WRIST FRACTURE SURGERY          Social History     Socioeconomic History    Marital status:    Tobacco Use    Smoking status: Former     Current packs/day: 1.00     Average packs/day: 1 pack/day for 18.0 years (18.0 ttl pk-yrs)     Types: Cigarettes    Smokeless tobacco: Never    Tobacco comments:     Quit in    Substance and Sexual Activity    Alcohol use: Not Currently     Comment: Rarely drink alcohol    Drug use: Not Currently    Sexual activity: Not Currently     "    Family History   Problem Relation Age of Onset    Asbestos Mother     Cancer Mother         Mesothelioma    Suicide Father     Depression Father         Suicide    Depression Brother         Suicide        Review of patient's allergies indicates:  No Known Allergies       There is no immunization history on file for this patient.     Review of Systems   All other systems reviewed and are negative.         Objective:      /70 (BP Location: Left arm)   Pulse 70   Temp 97.9 °F (36.6 °C) (Oral)   Resp 18   Ht 5' 4" (1.626 m)   Wt 94.9 kg (209 lb 3.2 oz)   BMI 35.91 kg/m²      Physical Exam  Constitutional:       Appearance: Normal appearance.   HENT:      Head: Normocephalic and atraumatic.      Mouth/Throat:      Pharynx: No oropharyngeal exudate or posterior oropharyngeal erythema.   Eyes:      Extraocular Movements: Extraocular movements intact.      Pupils: Pupils are equal, round, and reactive to light.   Cardiovascular:      Rate and Rhythm: Normal rate and regular rhythm.      Heart sounds: No murmur heard.  Pulmonary:      Effort: No respiratory distress.      Breath sounds: No wheezing, rhonchi or rales.   Abdominal:      General: Bowel sounds are normal. There is no distension.      Palpations: Abdomen is soft.      Tenderness: There is no abdominal tenderness. There is no right CVA tenderness or left CVA tenderness.   Musculoskeletal:         General: No swelling or tenderness.      Cervical back: Neck supple. No rigidity or tenderness.   Lymphadenopathy:      Cervical: No cervical adenopathy.   Skin:     Findings: No lesion or rash.   Neurological:      General: No focal deficit present.      Mental Status: She is alert and oriented to person, place, and time. Mental status is at baseline.      Cranial Nerves: No cranial nerve deficit.      Motor: No weakness.   Psychiatric:         Mood and Affect: Mood normal.         Behavior: Behavior normal.            Assessment:       Problem List " Items Addressed This Visit          ENT    Ototoxicity of both ears       Pulmonary    Chronic cough    Bronchiectasis without complication       Renal/    CKD (chronic kidney disease)       ID    Pulmonary Mycobacterium avium complex (MAC) infection    SAE (mycobacterium avium-intracellulare) - Primary                  Plan:       -Sputum cultures from 04/2022 and 06/2022 remain positive for MAC despite daily dosing regimen  -Completed three-month course of amikacin  -Repeat cultures collected on 11/21/2022 did not have any mycobacterial growth  -Kidney function stabilized and on most recent check, has been fluctuating between normal and mild dysfunction   -Hearing aids have been successful in helping her hearing loss    -at completion therapy on 10/26/2023, the patient had significantly improved, her cough has resolved, no sputum production., patient completed at that time a year of azithromycin, ethambutol, rifampin after completing initial 3 months of aminoglycosides on 10/2022  -antibiotics were discontinued at that time  -repeat a CT scan done on 01/04/2024 showed new consolidative opacities in the lower lobes compared to the 1 on 05/29/2023 which had stable findings of tree in bud and bronchiectasis compared to the CT prior  -at previous follow-up on 01/2024 she had recurrence of symptoms as well, she was treated or Pseudomonas aeruginosa in sputum cultures with a week of ciprofloxacin which yielded some improvement in the symptoms, only residual symptoms at this time is frequent clearing of the throat  -3 AFB cultures of the sputum repeated, mostly without any growth at this time however there suspicion of a positive AFB on 1 of them, awaiting final results    -await final results of the above AFB cultures, accordingly, we will discuss treatment options with the patient  -monitoring of symptoms and imaging might be an option, if further aggressive treatment is desired, we will consider strategies as  below  -1 option would be to attempt inhaled liposomal amikacin versus attempt addition of clofazimine to regimen.  Given her previous reaction with nephrotoxicity and ototoxicity, I am reluctant to attempt any form of aminoglycosides again, we will re-evaluate once we have our labs and cultures back      Follow up in about 4 weeks (around 3/27/2024).    45 minutes of total time spent on the encounter, which includes face to face time and non-face to face time preparing to see the patient (eg, review of tests), Obtaining and/or reviewing separately obtained history, Documenting clinical information in the electronic or other health record, Independently interpreting results (not separately reported) and communicating results to the patient/family/caregiver, or Care coordination (not separately reported).

## 2024-03-15 LAB
MYCOBACTERIAL SUSC PNL ISLT SLOWMYCO: ABNORMAL
MYCOBACTERIAL SUSC PNL ISLT SLOWMYCO: ABNORMAL

## 2024-03-22 LAB — CEFTIBUTEN ISLT MIC: ABNORMAL

## 2024-03-27 ENCOUNTER — OFFICE VISIT (OUTPATIENT)
Dept: INFECTIOUS DISEASES | Facility: CLINIC | Age: 69
End: 2024-03-27
Payer: MEDICARE

## 2024-03-27 VITALS
DIASTOLIC BLOOD PRESSURE: 82 MMHG | OXYGEN SATURATION: 99 % | HEIGHT: 64 IN | HEART RATE: 86 BPM | BODY MASS INDEX: 35.68 KG/M2 | SYSTOLIC BLOOD PRESSURE: 135 MMHG | RESPIRATION RATE: 20 BRPM | WEIGHT: 209 LBS

## 2024-03-27 DIAGNOSIS — A31.0 PULMONARY MYCOBACTERIUM AVIUM COMPLEX (MAC) INFECTION: ICD-10-CM

## 2024-03-27 DIAGNOSIS — A31.0 MYCOBACTERIUM AVIUM COMPLEX: Primary | ICD-10-CM

## 2024-03-27 DIAGNOSIS — A31.0 MAI (MYCOBACTERIUM AVIUM-INTRACELLULARE): ICD-10-CM

## 2024-03-27 DIAGNOSIS — N18.9 CHRONIC KIDNEY DISEASE, UNSPECIFIED CKD STAGE: ICD-10-CM

## 2024-03-27 LAB
ALBUMIN SERPL-MCNC: 4.2 G/DL (ref 3.4–4.8)
ALBUMIN/GLOB SERPL: 1.2 RATIO (ref 1.1–2)
ALP SERPL-CCNC: 31 UNIT/L (ref 40–150)
ALT SERPL-CCNC: 13 UNIT/L (ref 0–55)
AST SERPL-CCNC: 18 UNIT/L (ref 5–34)
BASOPHILS # BLD AUTO: 0.04 X10(3)/MCL
BASOPHILS NFR BLD AUTO: 0.7 %
BILIRUB SERPL-MCNC: 0.4 MG/DL
BUN SERPL-MCNC: 28 MG/DL (ref 9.8–20.1)
CALCIUM SERPL-MCNC: 10.4 MG/DL (ref 8.4–10.2)
CEFTIBUTEN ISLT MIC: ABNORMAL
CEFTIBUTEN ISLT MIC: ABNORMAL
CHLORIDE SERPL-SCNC: 104 MMOL/L (ref 98–107)
CO2 SERPL-SCNC: 25 MMOL/L (ref 23–31)
CREAT SERPL-MCNC: 1.49 MG/DL (ref 0.55–1.02)
EOSINOPHIL # BLD AUTO: 0.26 X10(3)/MCL (ref 0–0.9)
EOSINOPHIL NFR BLD AUTO: 4.6 %
ERYTHROCYTE [DISTWIDTH] IN BLOOD BY AUTOMATED COUNT: 13.5 % (ref 11.5–17)
GFR SERPLBLD CREATININE-BSD FMLA CKD-EPI: 37 MLS/MIN/1.73/M2
GLOBULIN SER-MCNC: 3.6 GM/DL (ref 2.4–3.5)
GLUCOSE SERPL-MCNC: 113 MG/DL (ref 82–115)
HCT VFR BLD AUTO: 37.2 % (ref 37–47)
HGB BLD-MCNC: 12.2 G/DL (ref 12–16)
IMM GRANULOCYTES # BLD AUTO: 0.03 X10(3)/MCL (ref 0–0.04)
IMM GRANULOCYTES NFR BLD AUTO: 0.5 %
LYMPHOCYTES # BLD AUTO: 0.72 X10(3)/MCL (ref 0.6–4.6)
LYMPHOCYTES NFR BLD AUTO: 12.7 %
MCH RBC QN AUTO: 30.3 PG (ref 27–31)
MCHC RBC AUTO-ENTMCNC: 32.8 G/DL (ref 33–36)
MCV RBC AUTO: 92.5 FL (ref 80–94)
MONOCYTES # BLD AUTO: 0.42 X10(3)/MCL (ref 0.1–1.3)
MONOCYTES NFR BLD AUTO: 7.4 %
NEUTROPHILS # BLD AUTO: 4.2 X10(3)/MCL (ref 2.1–9.2)
NEUTROPHILS NFR BLD AUTO: 74.1 %
NRBC BLD AUTO-RTO: 0 %
PLATELET # BLD AUTO: 311 X10(3)/MCL (ref 130–400)
PMV BLD AUTO: 10.3 FL (ref 7.4–10.4)
POTASSIUM SERPL-SCNC: 4.5 MMOL/L (ref 3.5–5.1)
PROT SERPL-MCNC: 7.8 GM/DL (ref 5.8–7.6)
RBC # BLD AUTO: 4.02 X10(6)/MCL (ref 4.2–5.4)
SODIUM SERPL-SCNC: 140 MMOL/L (ref 136–145)
WBC # SPEC AUTO: 5.67 X10(3)/MCL (ref 4.5–11.5)

## 2024-03-27 PROCEDURE — 80053 COMPREHEN METABOLIC PANEL: CPT | Performed by: GENERAL PRACTICE

## 2024-03-27 PROCEDURE — 85025 COMPLETE CBC W/AUTO DIFF WBC: CPT | Performed by: GENERAL PRACTICE

## 2024-03-27 PROCEDURE — 99215 OFFICE O/P EST HI 40 MIN: CPT | Mod: S$PBB,,, | Performed by: GENERAL PRACTICE

## 2024-03-27 PROCEDURE — 99999 PR PBB SHADOW E&M-EST. PATIENT-LVL IV: CPT | Mod: PBBFAC,,, | Performed by: GENERAL PRACTICE

## 2024-03-27 PROCEDURE — 99214 OFFICE O/P EST MOD 30 MIN: CPT | Mod: PBBFAC | Performed by: GENERAL PRACTICE

## 2024-03-27 PROCEDURE — 36415 COLL VENOUS BLD VENIPUNCTURE: CPT | Performed by: GENERAL PRACTICE

## 2024-03-27 RX ORDER — AZITHROMYCIN 500 MG/1
500 TABLET, FILM COATED ORAL DAILY
Qty: 30 TABLET | Refills: 2 | Status: SHIPPED | OUTPATIENT
Start: 2024-03-27

## 2024-03-27 RX ORDER — SODIUM CHLORIDE IRRIG SOLUTION 0.9 %
900 SOLUTION, IRRIGATION IRRIGATION ONCE
COMMUNITY
Start: 2024-03-07

## 2024-03-27 RX ORDER — RIFABUTIN 150 MG/1
300 CAPSULE ORAL DAILY
Qty: 60 CAPSULE | Refills: 3 | Status: SHIPPED | OUTPATIENT
Start: 2024-03-27 | End: 2024-03-28

## 2024-03-27 RX ORDER — ETHAMBUTOL HYDROCHLORIDE 400 MG/1
1200 TABLET, FILM COATED ORAL DAILY
Qty: 90 TABLET | Refills: 2 | Status: SHIPPED | OUTPATIENT
Start: 2024-03-27 | End: 2024-03-28

## 2024-03-27 NOTE — PROGRESS NOTES
Subjective:       Patient ID: Carmen Mckinley 69 y.o.     Chief Complaint:   Chief Complaint   Patient presents with    4 week f/u        HPI:   1/11/2022:  66-year-old female patient known to have past medical history of pulmonary SAE, diagnosed in January 2021 presents for follow-up.   The patient had been on 3 times weekly azithromycin, ethambutol, rifampin from January 2021 up until December 2021.  She had significant improvement in her imaging between January 2021 and April 2021.  However her sputum AFB cultures have remained positive up until most recently in September 2021.  Patient contacted our office again in December 2021 after noticing her cultures remained positive.  At that point, I had multiple discussions with the patient as documented in the chart prior to today's visit, and switched her medication regimen to daily administration of rifampin, ethambutol, azithromycin.  Most recent susceptibility testing shows isolate is still sensitive to macrolides.   She was also experiencing side effects from the rifampin intermittent dosing, and the shape of flulike symptoms.  Since switching to daily dosing of the medication, she reports that her symptoms have subsided.  She notes ongoing cough however no significant changes in its frequency and no increase in sputum production.  She denies any weight loss, no fevers.  No shortness of breath.  She also denies any abdominal pain, no diarrhea, no changes in the urine color or in the stool color.  She denies any fatigue and reports normal appetite.  Most recent blood work done 12/29/2021, showing mild hyperbilirubinemia.  Otherwise normal LFTs. [1]    03/03/2022:   Patient presents today for follow-up.  She reports her cough is minimal and does not have any shortness of breath.  No fevers, no chills, no weight loss, no night sweats.  At our last visit we had paged her regimen from intermittent dosing to daily dosing of azithromycin, rifampin, ethambutol given that  she has not cleared her sputum cultures yet despite about a year of therapy.  She reports good tolerability to the antimicrobials.    06/02/2022:  No fevers, no chills, continues to have a mild cough especially in the morning. Otherwise no weight loss and continues with activities of daily living. She is taking Rifampin, Azithromycin, Ethambutol daily at this time. Her most recent cultures collected 03/2022 remain positive after 3 months on daily regimen. Repeat CT scan stable from prior.    07/28/2022  Presents for follow up. Had COVID since last visit but only mild symptoms. Cx sputum from 06/2022 and 04/2022 remain positive for SAE despite 6 months of the daily dosing regimen. Susceptibilities from 04/2022 with no resistance to Macrolides and S to Aminoglycosides. Otherwise clinically feels well, no fevers, no chills.     11/16/2022:  Today for follow-up.  Unfortunately she has developed toxicity from her amikacin.  She reports that about 7-10 days prior to notifying us, she had started noticing some increased fatigue as well as decreased hearing acuity.  Initially did not think much of it as she was nearing the end of her therapy however these symptoms continued to worsen and she informed our office.  At that time around 10/20/2022, amikacin was discontinued.  Referral was made to audiology (after baseline audiology evaluation) and labs ordered by patient's primary care physician. Showing worsening anemia to 9.8 from 12 and ARNOL (creatinine of 1.8 from baseline 0.9). She does report some improvement since discontinuing amikacin.  Although her hearing continues to be affected, her energy is improved and her appetite is back.  She reports no concerns with urination.  And all-in-all has been doing better since discontinuing Amkacin.  She continues to have the PICC line in place.    12/15/2022:  Since her last visit, she reports some improvement. She has more energy and is more active. She continues to have episodes  of diarrhea which are not constant but are significant in terms of affecting her lifestyle. She followed up with audiology who reports her hearing loss is less likely related to Aminoglycoside however, given timing of worsening of hearing loss, it is likely that it contributed to that. She followed up with nephrology and her kidney function is recovering slowly, her GFR is at 41 most recently, she is due to repeat her labs in early January and follow up with nephrology as well as our office. Her repeat cultures collected 11/21/2022 have so far remained negative per my discussion with micro lab.     03/21/2023:  Kidney function improved with creatinine of 1.2 and GFR of 48. She is tolerating antimycobacterials better with no diarrhea since she started eating yogurt regularly. Currently without new concerns. She has negative AFB culture in 11/2022.      06/22/2023:  Kidney function appears to have stabilized with GFR 40-50 in the past 6 months. Patient has returned to her daily activity, she has no significant cough, no night sweats, no fevers, continues to have intermittent episodes of diarrhea but these have been better controlled. No new concerns, needs some refills on medication.     10/26/2023:  No new concerns.  Most recent kidney function appears to have returned to normal.  No fevers, no chills, cough is minimal, with minimal if any sputum production.  No weight loss, no night sweats, no chills.  She continues to have episodic diarrhea with the current medication.    01/30/2024:  Patient had repeat CT scan of the chest earlier this month after about 3 months off antimicrobials.  Unfortunately, this has shown some progression of disease and new infiltrates especially at the lower lobes bilaterally.  This was compared to a CT scan done back in 05/2023.  In addition, the patient reports that she has starting to have sputum production which is greenish with some thick material it, and cough that has been more  persistent at this time.  No significant changes in her shortness of.  Denies any other respiratory symptoms.  No fevers, no chills, no weight loss, no night sweats.  Patient notes no change in her bowel habits, no changes in her urinary habits.    2024:  Patient presents today for follow-up, we repeated patient's sputum cultures at the previous visit, including 3 AFB cultures.  Typical respiratory cultures however were positive for moderate Pseudomonas aeruginosa that was pansensitive.  We prescribed her a week of ciprofloxacin.  She reports improvement in her symptoms after completion of the course.  Reports improved cough, improved shortness of breath.  However she does have ongoing tickle with repetitive clearing of her throat.  Otherwise, no new concerning symptoms.    2024:  She presents today for follow-up, she reports ongoing cough which has somewhat worsened, more sputum production, increased shortness of breath.  All tolerable however significantly worsened from prior.  Cultures resulted, noted to have MAC but also mixed cultures with other mycobacteria, mycobacterium avium complex which I suspect to be the major pathogen is susceptible to macrolide.    Of note, she started on Apixaban in 2024 after concerns for TIA and concern for SVT in the LLE.     Past Medical History:   Diagnosis Date    Anxiety     Arthritis     Depression     GERD (gastroesophageal reflux disease)     Hyperlipidemia     Hypertension         Past Surgical History:   Procedure Laterality Date    APPENDECTOMY       SECTION      ECTOPIC PREGNANCY SURGERY      FRACTURE SURGERY  2021    Wrist    FUNCTIONAL ENDOSCOPIC SINUS SURGERY (FESS) Bilateral     HERNIA REPAIR  1970s    MANDIBLE SURGERY Bilateral     PERIPHERALLY INSERTED CENTRAL CATHETER INSERTION N/A 2022    Procedure: INSERTION, PICC;  Surgeon: Ismael Stroud MD;  Location: [facility];  Service: Infectious Disease;  Laterality: N/A;    TUBAL LIGATION   "05-    WRIST FRACTURE SURGERY          Social History     Socioeconomic History    Marital status:    Tobacco Use    Smoking status: Former     Current packs/day: 1.00     Average packs/day: 1 pack/day for 18.0 years (18.0 ttl pk-yrs)     Types: Cigarettes    Smokeless tobacco: Never    Tobacco comments:     Quit in 1992   Substance and Sexual Activity    Alcohol use: Not Currently     Comment: Rarely drink alcohol    Drug use: Not Currently    Sexual activity: Not Currently        Family History   Problem Relation Age of Onset    Asbestos Mother     Cancer Mother         Mesothelioma    Suicide Father     Depression Father         Suicide    Depression Brother         Suicide        Review of patient's allergies indicates:  No Known Allergies       There is no immunization history on file for this patient.     Review of Systems   All other systems reviewed and are negative.         Objective:      /82   Pulse 86   Resp 20   Ht 5' 4" (1.626 m)   Wt 94.8 kg (209 lb)   SpO2 99%   BMI 35.87 kg/m²      Physical Exam  Constitutional:       Appearance: Normal appearance.   HENT:      Head: Normocephalic and atraumatic.      Mouth/Throat:      Pharynx: No oropharyngeal exudate or posterior oropharyngeal erythema.   Eyes:      Extraocular Movements: Extraocular movements intact.      Pupils: Pupils are equal, round, and reactive to light.   Cardiovascular:      Rate and Rhythm: Normal rate and regular rhythm.      Heart sounds: No murmur heard.  Pulmonary:      Effort: No respiratory distress.      Breath sounds: No wheezing, rhonchi or rales.   Abdominal:      General: Bowel sounds are normal. There is no distension.      Palpations: Abdomen is soft.      Tenderness: There is no abdominal tenderness. There is no right CVA tenderness or left CVA tenderness.   Musculoskeletal:         General: No swelling or tenderness.      Cervical back: Neck supple. No rigidity or tenderness.   Lymphadenopathy: "      Cervical: No cervical adenopathy.   Skin:     Findings: No lesion or rash.   Neurological:      General: No focal deficit present.      Mental Status: She is alert and oriented to person, place, and time. Mental status is at baseline.      Cranial Nerves: No cranial nerve deficit.      Motor: No weakness.   Psychiatric:         Mood and Affect: Mood normal.         Behavior: Behavior normal.            Assessment:       Problem List Items Addressed This Visit          Renal/    CKD (chronic kidney disease)    Relevant Orders    CBC Auto Differential    Comprehensive Metabolic Panel       ID    Pulmonary Mycobacterium avium complex (MAC) infection    Relevant Medications    rifabutin (MYCOBUTIN) 150 mg Cap    ethambutoL (MYAMBUTOL) 400 MG Tab    azithromycin (ZITHROMAX) 500 MG tablet    SAE (mycobacterium avium-intracellulare)    Relevant Medications    rifabutin (MYCOBUTIN) 150 mg Cap    ethambutoL (MYAMBUTOL) 400 MG Tab    azithromycin (ZITHROMAX) 500 MG tablet     Other Visit Diagnoses       Mycobacterium avium complex    -  Primary    Relevant Medications    rifabutin (MYCOBUTIN) 150 mg Cap    ethambutoL (MYAMBUTOL) 400 MG Tab    azithromycin (ZITHROMAX) 500 MG tablet    Other Relevant Orders    CBC Auto Differential    Comprehensive Metabolic Panel                         Plan:       -Sputum cultures from 04/2022 and 06/2022 remain positive for MAC despite daily dosing regimen  -Completed three-month course of amikacin  -Repeat cultures collected on 11/21/2022 did not have any mycobacterial growth  -Kidney function stabilized and on most recent check, has been fluctuating between normal and mild dysfunction   -Hearing aids have been successful in helping her hearing loss    -at completion therapy on 10/26/2023, the patient had significantly improved, her cough has resolved, no sputum production., patient completed at that time a year of azithromycin, ethambutol, rifampin after completing initial 3 months  of aminoglycosides on 10/2022  -antibiotics were discontinued at that time  -repeat a CT scan done on 01/04/2024 showed new consolidative opacities in the lower lobes compared to the 1 on 05/29/2023 which had stable findings of tree in bud and bronchiectasis compared to the CT prior  -at previous follow-up on 01/2024 she had recurrence of symptoms as well, she was treated or Pseudomonas aeruginosa in sputum cultures with a week of ciprofloxacin which yielded some improvement in the symptoms, only residual symptoms at this time is frequent clearing of the throat  -3 AFB cultures of the sputum repeated 02/08/2024, now with MAC growing as well as mixed colonies of other mycobacteria    -discussed this with these results with the patient, unfortunately it appears that we were not able to successfully eradicate her mycobacterium avium complex from the lungs, and she is again symptomatic.  -this has been a protracted course, the patient has had this infection for the past 5-6 years, despite aggressive therapy were not able to eradicated, however the patient was symptomatically doing much better and had noted some significant improvement during the course of treatment  -discussed the difficulty of eradication of the organism especially with a mixed colonies at this time, also discussed the downsides watchful waiting especially given worsening on imaging and with symptoms since discontinuation of antibiotics   -being started on apixaban, this will be challenging given interaction with antibiotics that we would have to use for MAC therapy namely rifampin   -therefore restart azithromycin 500 mg p.o. Q 24 hours   -rifabutin 100 mg p.o. Q 24 hours  -Ethambutol 15 milligrams/kilograms p.o. Q 24 hours, however given her BMI of 35, compromised renal function, will use adjusted body weight for calculations will start with 1000 mg Q 24 hours  -will also obtain baseline labs today and repeat 1 week after starting  antibiotics      Follow up in about 2 weeks (around 4/10/2024).    45 minutes of total time spent on the encounter, which includes face to face time and non-face to face time preparing to see the patient (eg, review of tests), Obtaining and/or reviewing separately obtained history, Documenting clinical information in the electronic or other health record, Independently interpreting results (not separately reported) and communicating results to the patient/family/caregiver, or Care coordination (not separately reported).

## 2024-03-28 RX ORDER — RIFABUTIN 150 MG/1
150 CAPSULE ORAL DAILY
Qty: 30 CAPSULE | Refills: 2 | Status: SHIPPED | OUTPATIENT
Start: 2024-03-28 | End: 2024-05-23

## 2024-03-28 RX ORDER — ETHAMBUTOL HYDROCHLORIDE 400 MG/1
1000 TABLET, FILM COATED ORAL DAILY
Qty: 90 TABLET | Refills: 2 | Status: SHIPPED | OUTPATIENT
Start: 2024-03-28 | End: 2024-06-26

## 2024-04-10 ENCOUNTER — OFFICE VISIT (OUTPATIENT)
Dept: INFECTIOUS DISEASES | Facility: CLINIC | Age: 69
End: 2024-04-10
Payer: MEDICARE

## 2024-04-10 VITALS
HEART RATE: 78 BPM | RESPIRATION RATE: 20 BRPM | BODY MASS INDEX: 34.82 KG/M2 | HEIGHT: 65 IN | OXYGEN SATURATION: 99 % | WEIGHT: 209 LBS | DIASTOLIC BLOOD PRESSURE: 73 MMHG | SYSTOLIC BLOOD PRESSURE: 125 MMHG

## 2024-04-10 DIAGNOSIS — J47.9 BRONCHIECTASIS WITHOUT COMPLICATION: ICD-10-CM

## 2024-04-10 DIAGNOSIS — N18.9 CHRONIC KIDNEY DISEASE, UNSPECIFIED CKD STAGE: ICD-10-CM

## 2024-04-10 DIAGNOSIS — H93.8X3 OTOTOXICITY OF BOTH EARS: ICD-10-CM

## 2024-04-10 DIAGNOSIS — A31.0 MAI (MYCOBACTERIUM AVIUM-INTRACELLULARE): ICD-10-CM

## 2024-04-10 DIAGNOSIS — A31.0 PULMONARY MYCOBACTERIUM AVIUM COMPLEX (MAC) INFECTION: Primary | ICD-10-CM

## 2024-04-10 PROCEDURE — 99999 PR PBB SHADOW E&M-EST. PATIENT-LVL III: CPT | Mod: PBBFAC,,, | Performed by: GENERAL PRACTICE

## 2024-04-10 PROCEDURE — 99213 OFFICE O/P EST LOW 20 MIN: CPT | Mod: PBBFAC | Performed by: GENERAL PRACTICE

## 2024-04-10 PROCEDURE — 99215 OFFICE O/P EST HI 40 MIN: CPT | Mod: S$PBB,,, | Performed by: GENERAL PRACTICE

## 2024-04-10 NOTE — PROGRESS NOTES
"Subjective:       Patient ID: Carmen Mckinley 69 y.o.     Chief Complaint:   Chief Complaint   Patient presents with    f/u        HPI:   12/08/2020 (initial evaluation by Dr. Brooks)  The patient is a 65 year old female who is referred to me from her PCP for evaluation of mycobacterial pulmonary infection. It appears that she has been suffering from chronic cough, her symptoms initially started about 3 years ago. She was then treated for "mycobacterial disease" with Azithromycin for about 6 months with improvement of her symptoms. however after stopping abx her symptoms recurred. she has since then been treated with a few regimens of abx (short courses, never combination Rx) without periodic and temporary improvement, only to recur. She denies any fevers, chills, sweats, chest pains (except when her cough is bad). She is here for evaluation and possible treatment.  She had imaging and cultures done at [facility] back in August (the latest), we didn't have the results of these tests with the referral package. She denies any other complaints today.  Will get the latest cultures and imaging studies from [facility], as the diagnosis and management will depend on those. specially in light of her prior treatment history.  would like to get new AFB smears and cultures prior to starting therapy  treatment will depend on the clinical, radiologic, and micro data    01/21/2021:  The patient is a pleasant 66-year-old female with chronic cough and Mycobacterium AVM disease, follow-up appointment, we have obtained cultures and lung imaging for her. Her cultures were positive for MAC and this was still clarithromycin sensitive which is good news. 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 She continues to have cough that is chronically productive, daily and nightly, she denies having any fevers or chills or sweats or other complaints today. She is ready to start treatment.  The " patient meets clinical, radiologic, and culture criteria for disease, and thus warrants treatment.  Rx for reticulonodular disease as follows:  Azithromycin 500mg  Ethambutol 2g  Rifampin 600mg  all orally, and three times a week  I discussed each of these meds possible common side effects with the patient and answered all her questions, she is in agreement with the plan.  Repeat imaging in 4 months, as well as cultures.    1/11/2022:  66-year-old female patient known to have past medical history of pulmonary SAE, diagnosed in January 2021 presents for follow-up.  The patient had been on 3 times weekly azithromycin, ethambutol, rifampin from January 2021 up until December 2021.  She had significant improvement in her imaging between January 2021 and April 2021.  However her sputum AFB cultures have remained positive up until most recently in September 2021.  Patient contacted our office again in December 2021 after noticing her cultures remained positive.  At that point, I had multiple discussions with the patient as documented in the chart prior to today's visit, and switched her medication regimen to daily administration of rifampin, ethambutol, azithromycin.  Most recent susceptibility testing shows isolate is still sensitive to macrolides.   She was also experiencing side effects from the rifampin intermittent dosing, and the shape of flulike symptoms.  Since switching to daily dosing of the medication, she reports that her symptoms have subsided.  She notes ongoing cough however no significant changes in its frequency and no increase in sputum production.  She denies any weight loss, no fevers.  No shortness of breath.  She also denies any abdominal pain, no diarrhea, no changes in the urine color or in the stool color.  She denies any fatigue and reports normal appetite.  Most recent blood work done 12/29/2021, showing mild hyperbilirubinemia.  Otherwise normal LFTs. [1]      03/03/2022:   Patient presents  today for follow-up.  She reports her cough is minimal and does not have any shortness of breath.  No fevers, no chills, no weight loss, no night sweats.  At our last visit we had paged her regimen from intermittent dosing to daily dosing of azithromycin, rifampin, ethambutol given that she has not cleared her sputum cultures yet despite about a year of therapy.  She reports good tolerability to the antimicrobials.    06/02/2022:  No fevers, no chills, continues to have a mild cough especially in the morning. Otherwise no weight loss and continues with activities of daily living. She is taking Rifampin, Azithromycin, Ethambutol daily at this time. Her most recent cultures collected 03/2022 remain positive after 3 months on daily regimen. Repeat CT scan stable from prior.    07/28/2022  Presents for follow up. Had COVID since last visit but only mild symptoms. Cx sputum from 06/2022 and 04/2022 remain positive for SAE despite 6 months of the daily dosing regimen. Susceptibilities from 04/2022 with no resistance to Macrolides and S to Aminoglycosides. Otherwise clinically feels well, no fevers, no chills.     11/16/2022:  Today for follow-up.  Unfortunately she has developed toxicity from her amikacin.  She reports that about 7-10 days prior to notifying us, she had started noticing some increased fatigue as well as decreased hearing acuity.  Initially did not think much of it as she was nearing the end of her therapy however these symptoms continued to worsen and she informed our office.  At that time around 10/20/2022, amikacin was discontinued.  Referral was made to audiology (after baseline audiology evaluation) and labs ordered by patient's primary care physician. Showing worsening anemia to 9.8 from 12 and ARNOL (creatinine of 1.8 from baseline 0.9). She does report some improvement since discontinuing amikacin.  Although her hearing continues to be affected, her energy is improved and her appetite is back.  She  reports no concerns with urination.  And all-in-all has been doing better since discontinuing Amkacin.  She continues to have the PICC line in place.    12/15/2022:  Since her last visit, she reports some improvement. She has more energy and is more active. She continues to have episodes of diarrhea which are not constant but are significant in terms of affecting her lifestyle. She followed up with audiology who reports her hearing loss is less likely related to Aminoglycoside however, given timing of worsening of hearing loss, it is likely that it contributed to that. She followed up with nephrology and her kidney function is recovering slowly, her GFR is at 41 most recently, she is due to repeat her labs in early January and follow up with nephrology as well as our office. Her repeat cultures collected 11/21/2022 have so far remained negative per my discussion with micro lab.     03/21/2023:  Kidney function improved with creatinine of 1.2 and GFR of 48. She is tolerating antimycobacterials better with no diarrhea since she started eating yogurt regularly. Currently without new concerns. She has negative AFB culture in 11/2022.      06/22/2023:  Kidney function appears to have stabilized with GFR 40-50 in the past 6 months. Patient has returned to her daily activity, she has no significant cough, no night sweats, no fevers, continues to have intermittent episodes of diarrhea but these have been better controlled. No new concerns, needs some refills on medication.     10/26/2023:  No new concerns.  Most recent kidney function appears to have returned to normal.  No fevers, no chills, cough is minimal, with minimal if any sputum production.  No weight loss, no night sweats, no chills.  She continues to have episodic diarrhea with the current medication.    01/30/2024:  Patient had repeat CT scan of the chest earlier this month after about 3 months off antimicrobials.  Unfortunately, this has shown some progression  of disease and new infiltrates especially at the lower lobes bilaterally.  This was compared to a CT scan done back in 05/2023.  In addition, the patient reports that she has starting to have sputum production which is greenish with some thick material it, and cough that has been more persistent at this time.  No significant changes in her shortness of.  Denies any other respiratory symptoms.  No fevers, no chills, no weight loss, no night sweats.  Patient notes no change in her bowel habits, no changes in her urinary habits.    02/28/2024:  Patient presents today for follow-up, we repeated patient's sputum cultures at the previous visit, including 3 AFB cultures.  Typical respiratory cultures however were positive for moderate Pseudomonas aeruginosa that was pansensitive.  We prescribed her a week of ciprofloxacin.  She reports improvement in her symptoms after completion of the course.  Reports improved cough, improved shortness of breath.  However she does have ongoing tickle with repetitive clearing of her throat.  Otherwise, no new concerning symptoms.    03/27/2024:  She presents today for follow-up, she reports ongoing cough which has somewhat worsened, more sputum production, increased shortness of breath.  All tolerable however significantly worsened from prior.  Cultures resulted, noted to have MAC but also mixed cultures with other mycobacteria, mycobacterium avium complex which I suspect to be the major pathogen is susceptible to macrolide.  Of note, she started on Apixaban in 02/2024 after concerns for TIA and concern for SVT in the LLE.     04/10/2024:  Today for follow-up.  Started rifabutin, azithromycin, ethambutol 03/28/2024.  Since then, she reports some intermittent episodes of diarrhea, eating yogurts instead of probiotics but generally tolerating antibiotics. No changes in urinary habits, she reports that the last time she got her labs done she had not drank or eaten anything in the morning  "which could account for the increase in her creatinine and drop in her renal function. Has some decreased cough but generally about the same. Still on Apixaban, tolerated Rifabutin well. No excessive bleeding episodes, no bruising.     Past Medical History:   Diagnosis Date    Anxiety     Arthritis     Depression     GERD (gastroesophageal reflux disease)     Hyperlipidemia     Hypertension         Past Surgical History:   Procedure Laterality Date    APPENDECTOMY       SECTION      ECTOPIC PREGNANCY SURGERY      FRACTURE SURGERY  2021    Wrist    FUNCTIONAL ENDOSCOPIC SINUS SURGERY (FESS) Bilateral     HERNIA REPAIR  1970s    MANDIBLE SURGERY Bilateral     PERIPHERALLY INSERTED CENTRAL CATHETER INSERTION N/A 2022    Procedure: INSERTION, PICC;  Surgeon: Ismael Stroud MD;  Location: [facility];  Service: Infectious Disease;  Laterality: N/A;    TUBAL LIGATION  1989    WRIST FRACTURE SURGERY          Social History     Socioeconomic History    Marital status:    Tobacco Use    Smoking status: Former     Current packs/day: 1.00     Average packs/day: 1 pack/day for 18.0 years (18.0 ttl pk-yrs)     Types: Cigarettes    Smokeless tobacco: Never    Tobacco comments:     Quit in    Substance and Sexual Activity    Alcohol use: Not Currently     Comment: Rarely drink alcohol    Drug use: Not Currently    Sexual activity: Not Currently        Family History   Problem Relation Age of Onset    Asbestos Mother     Cancer Mother         Mesothelioma    Suicide Father     Depression Father         Suicide    Depression Brother         Suicide        Review of patient's allergies indicates:  No Known Allergies       There is no immunization history on file for this patient.     Review of Systems   All other systems reviewed and are negative.         Objective:      /73   Pulse 78   Resp 20   Ht 5' 5" (1.651 m)   Wt 94.8 kg (209 lb)   SpO2 99%   BMI 34.78 kg/m²      Physical " Exam  Constitutional:       Appearance: Normal appearance.   HENT:      Head: Normocephalic and atraumatic.      Mouth/Throat:      Pharynx: No oropharyngeal exudate or posterior oropharyngeal erythema.   Eyes:      Extraocular Movements: Extraocular movements intact.      Pupils: Pupils are equal, round, and reactive to light.   Cardiovascular:      Rate and Rhythm: Normal rate and regular rhythm.      Heart sounds: No murmur heard.  Pulmonary:      Effort: No respiratory distress.      Breath sounds: No wheezing, rhonchi or rales.   Abdominal:      General: Bowel sounds are normal. There is no distension.      Palpations: Abdomen is soft.      Tenderness: There is no abdominal tenderness. There is no right CVA tenderness or left CVA tenderness.   Musculoskeletal:         General: No swelling or tenderness.      Cervical back: Neck supple. No rigidity or tenderness.   Lymphadenopathy:      Cervical: No cervical adenopathy.   Skin:     Findings: No lesion or rash.   Neurological:      General: No focal deficit present.      Mental Status: She is alert and oriented to person, place, and time. Mental status is at baseline.      Cranial Nerves: No cranial nerve deficit.      Motor: No weakness.   Psychiatric:         Mood and Affect: Mood normal.         Behavior: Behavior normal.            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 Reportedly received monotherapy with Azithromycin, ultimately referred to ID in 2020, CT done initially with scarring and bronchiectasis but no cavitation, started on intermittent dosing of Rifampin, Azithromycin and Ethambutol [facility], completed about 1 year of this and did not achieve microbiologic clearance. imaging improved, some symptoms remained. 10/2021 cultures remained positive, transitioned to daily dosing of Rifampin, Azithromycin and Ethambutol. improved clinically. Stable imaging.  -Repeated cultures in 06/2022 remained positive for MAC. decided to add Amikacin IV, she almost completed a 3 month course of amikacin but unfortunately developed ARNOL and some acute ototoxicity on chronic hearing impairment towards the end of the course.   -Discontinued Amikacin and continued triple therapy otherwise with renal recovery although not back to baseline. repeat sputum culture in 11/2022 did not grow with patient barely coughing but noted volume was inadequate.  -Continued triple therapy for an additional year until 10/2023 and stopped at that time given significant improvement clinically and minimal if any cough, with minimal if any sputum production.   -Kidney function stabilized and on most recent check, has been fluctuating between normal and mild dysfunction   -Hearing aids have been successful in helping her hearing loss    -at completion therapy on 10/26/2023, the patient had significantly improved, her cough has resolved, no sputum production., patient completed at that time a year of azithromycin, ethambutol, rifampin after completing initial 3 months of aminoglycosides on 10/2022  -antibiotics were discontinued at that time  -repeat a CT scan done on 01/04/2024 showed new consolidative opacities in the lower lobes compared to the 1 on 05/29/2023 which had stable findings of tree in bud and bronchiectasis compared to the CT prior  -at previous  follow-up on 01/2024 she had recurrence of symptoms as well, she was treated or Pseudomonas aeruginosa in sputum cultures with a week of ciprofloxacin which yielded some improvement in the symptoms, only residual symptoms at this time is frequent clearing of the throat  -3 AFB cultures of the sputum repeated 02/08/2024, now with MAC growing as well as mixed colonies of other mycobacteria    -discussed this with these results with the patient, unfortunately it appears that we were not able to successfully eradicate her mycobacterium avium complex from the lungs, and she is again symptomatic.  -this has been a protracted course, the patient has had this infection for the past 5-6 years, despite aggressive therapy were not able to eradicated, however the patient was symptomatically doing much better and had noted some significant improvement during the course of treatment  -discussed the difficulty of eradication of the organism especially with a mixed colonies at this time, also discussed the downsides watchful waiting especially given worsening on imaging and with symptoms since discontinuation of antibiotics   -being started on apixaban, this will be challenging given interaction with antibiotics that we would have to use for MAC therapy namely rifampin   -therefore restart azithromycin 500 mg p.o. Q 24 hours   -rifabutin 150 mg p.o. Q 24 hours (lower dose started due to impaired renal function)  -Will repeat CMP today and increase to 300mg PO q24h should the renal function be improved  -Ethambutol 15 milligrams/kilograms p.o. Q 24 hours, however given her BMI of 35, compromised renal function, will use adjusted body weight for calculations will start with 1000 mg Q 24 hours    -have also submitted a consultation request to [facility] in [place] and await call back, we will likely need to add her modify therapies, I suspect potentially adding inhaled amikacin could be an option however I am concerned  about ototoxicity or nephrotoxicity of this medication given the patient's history even with minimal absorption through the inhaled route  -requested addition of Omadacycline and Tedizolid susceptibilities to current organisms    Follow up in about 6 weeks (around 5/22/2024).    75 minutes of total time spent on the encounter, which includes face to face time and non-face to face time preparing to see the patient (eg, review of tests), Obtaining and/or reviewing separately obtained history, Documenting clinical information in the electronic or other health record, Independently interpreting results (not separately reported) and communicating results to the patient/family/caregiver, or Care coordination (not separately reported).

## 2024-04-11 ENCOUNTER — LAB VISIT (OUTPATIENT)
Dept: LAB | Facility: HOSPITAL | Age: 69
End: 2024-04-11
Attending: GENERAL PRACTICE
Payer: MEDICARE

## 2024-04-11 DIAGNOSIS — A31.0 MYCOBACTERIUM AVIUM COMPLEX: ICD-10-CM

## 2024-04-11 DIAGNOSIS — J47.9 BRONCHIECTASIS WITHOUT COMPLICATION: ICD-10-CM

## 2024-04-11 DIAGNOSIS — H93.8X3 OTOTOXICITY OF BOTH EARS: ICD-10-CM

## 2024-04-11 DIAGNOSIS — A31.0 MAI (MYCOBACTERIUM AVIUM-INTRACELLULARE): ICD-10-CM

## 2024-04-11 DIAGNOSIS — N18.9 CHRONIC KIDNEY DISEASE, UNSPECIFIED CKD STAGE: ICD-10-CM

## 2024-04-11 DIAGNOSIS — A31.0 PULMONARY MYCOBACTERIUM AVIUM COMPLEX (MAC) INFECTION: ICD-10-CM

## 2024-04-11 LAB
ALBUMIN SERPL-MCNC: 4 G/DL (ref 3.4–4.8)
ALBUMIN/GLOB SERPL: 1.2 RATIO (ref 1.1–2)
ALP SERPL-CCNC: 30 UNIT/L (ref 40–150)
ALT SERPL-CCNC: 13 UNIT/L (ref 0–55)
AST SERPL-CCNC: 21 UNIT/L (ref 5–34)
BASOPHILS # BLD AUTO: 0.02 X10(3)/MCL
BASOPHILS NFR BLD AUTO: 0.5 %
BILIRUB SERPL-MCNC: 0.3 MG/DL
BUN SERPL-MCNC: 23.2 MG/DL (ref 9.8–20.1)
CALCIUM SERPL-MCNC: 9.9 MG/DL (ref 8.4–10.2)
CHLORIDE SERPL-SCNC: 103 MMOL/L (ref 98–107)
CO2 SERPL-SCNC: 26 MMOL/L (ref 23–31)
CREAT SERPL-MCNC: 1.39 MG/DL (ref 0.55–1.02)
EOSINOPHIL # BLD AUTO: 0.16 X10(3)/MCL (ref 0–0.9)
EOSINOPHIL NFR BLD AUTO: 4.3 %
ERYTHROCYTE [DISTWIDTH] IN BLOOD BY AUTOMATED COUNT: 13.3 % (ref 11.5–17)
GFR SERPLBLD CREATININE-BSD FMLA CKD-EPI: 41 MLS/MIN/1.73/M2
GLOBULIN SER-MCNC: 3.4 GM/DL (ref 2.4–3.5)
GLUCOSE SERPL-MCNC: 87 MG/DL (ref 82–115)
HCT VFR BLD AUTO: 36.7 % (ref 37–47)
HGB BLD-MCNC: 11.9 G/DL (ref 12–16)
IMM GRANULOCYTES # BLD AUTO: 0.02 X10(3)/MCL (ref 0–0.04)
IMM GRANULOCYTES NFR BLD AUTO: 0.5 %
LYMPHOCYTES # BLD AUTO: 0.86 X10(3)/MCL (ref 0.6–4.6)
LYMPHOCYTES NFR BLD AUTO: 23.1 %
MCH RBC QN AUTO: 30.2 PG (ref 27–31)
MCHC RBC AUTO-ENTMCNC: 32.4 G/DL (ref 33–36)
MCV RBC AUTO: 93.1 FL (ref 80–94)
MONOCYTES # BLD AUTO: 0.5 X10(3)/MCL (ref 0.1–1.3)
MONOCYTES NFR BLD AUTO: 13.4 %
NEUTROPHILS # BLD AUTO: 2.17 X10(3)/MCL (ref 2.1–9.2)
NEUTROPHILS NFR BLD AUTO: 58.2 %
NRBC BLD AUTO-RTO: 0 %
PLATELET # BLD AUTO: 252 X10(3)/MCL (ref 130–400)
PMV BLD AUTO: 9.8 FL (ref 7.4–10.4)
POTASSIUM SERPL-SCNC: 4.1 MMOL/L (ref 3.5–5.1)
PROT SERPL-MCNC: 7.4 GM/DL (ref 5.8–7.6)
RBC # BLD AUTO: 3.94 X10(6)/MCL (ref 4.2–5.4)
SODIUM SERPL-SCNC: 136 MMOL/L (ref 136–145)
WBC # SPEC AUTO: 3.73 X10(3)/MCL (ref 4.5–11.5)

## 2024-04-11 PROCEDURE — 85025 COMPLETE CBC W/AUTO DIFF WBC: CPT

## 2024-04-11 PROCEDURE — 36415 COLL VENOUS BLD VENIPUNCTURE: CPT

## 2024-04-11 PROCEDURE — 80053 COMPREHEN METABOLIC PANEL: CPT

## 2024-05-01 ENCOUNTER — OFFICE VISIT (OUTPATIENT)
Dept: INFECTIOUS DISEASES | Facility: CLINIC | Age: 69
End: 2024-05-01
Payer: MEDICARE

## 2024-05-01 VITALS
SYSTOLIC BLOOD PRESSURE: 130 MMHG | HEART RATE: 75 BPM | DIASTOLIC BLOOD PRESSURE: 70 MMHG | BODY MASS INDEX: 34.16 KG/M2 | RESPIRATION RATE: 20 BRPM | WEIGHT: 205 LBS | HEIGHT: 65 IN

## 2024-05-01 DIAGNOSIS — A31.0 MYCOBACTERIUM AVIUM COMPLEX: Primary | ICD-10-CM

## 2024-05-01 DIAGNOSIS — H93.8X3 OTOTOXICITY OF BOTH EARS: ICD-10-CM

## 2024-05-01 DIAGNOSIS — J43.9 PULMONARY EMPHYSEMA, UNSPECIFIED EMPHYSEMA TYPE: ICD-10-CM

## 2024-05-01 DIAGNOSIS — N18.9 CHRONIC KIDNEY DISEASE, UNSPECIFIED CKD STAGE: ICD-10-CM

## 2024-05-01 DIAGNOSIS — R05.3 CHRONIC COUGH: ICD-10-CM

## 2024-05-01 DIAGNOSIS — J47.9 BRONCHIECTASIS WITHOUT COMPLICATION: ICD-10-CM

## 2024-05-01 DIAGNOSIS — H91.90 HEARING LOSS, UNSPECIFIED HEARING LOSS TYPE, UNSPECIFIED LATERALITY: ICD-10-CM

## 2024-05-01 PROCEDURE — 99215 OFFICE O/P EST HI 40 MIN: CPT | Mod: S$PBB,,, | Performed by: GENERAL PRACTICE

## 2024-05-01 PROCEDURE — 99999 PR PBB SHADOW E&M-EST. PATIENT-LVL V: CPT | Mod: PBBFAC,,, | Performed by: GENERAL PRACTICE

## 2024-05-01 PROCEDURE — 99215 OFFICE O/P EST HI 40 MIN: CPT | Mod: PBBFAC | Performed by: GENERAL PRACTICE

## 2024-05-01 NOTE — PROGRESS NOTES
"Subjective:       Patient ID: Carmen Mckinley 69 y.o.     Chief Complaint:   Chief Complaint   Patient presents with    Pulmonary Mycobacterium avium complex    Follow-up        HPI:   12/08/2020 (initial evaluation by Dr. Brooks)  The patient is a 65 year old female who is referred to me from her PCP for evaluation of mycobacterial pulmonary infection. It appears that she has been suffering from chronic cough, her symptoms initially started about 3 years ago. She was then treated for "mycobacterial disease" with Azithromycin for about 6 months with improvement of her symptoms. however after stopping abx her symptoms recurred. she has since then been treated with a few regimens of abx (short courses, never combination Rx) without periodic and temporary improvement, only to recur. She denies any fevers, chills, sweats, chest pains (except when her cough is bad). She is here for evaluation and possible treatment.  She had imaging and cultures done at [facility] back in August (the latest), we didn't have the results of these tests with the referral package. She denies any other complaints today.  Will get the latest cultures and imaging studies from [facility], as the diagnosis and management will depend on those. specially in light of her prior treatment history.  would like to get new AFB smears and cultures prior to starting therapy  treatment will depend on the clinical, radiologic, and micro data    01/21/2021:  The patient is a pleasant 66-year-old female with chronic cough and Mycobacterium AVM disease, follow-up appointment, we have obtained cultures and lung imaging for her. Her cultures were positive for MAC and this was still clarithromycin sensitive which is good news. 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 She continues to have cough that is chronically productive, daily and nightly, she denies having any fevers or chills or sweats or other complaints " today. She is ready to start treatment.  The patient meets clinical, radiologic, and culture criteria for disease, and thus warrants treatment.  Rx for reticulonodular disease as follows:  Azithromycin 500mg  Ethambutol 2g  Rifampin 600mg  all orally, and three times a week  I discussed each of these meds possible common side effects with the patient and answered all her questions, she is in agreement with the plan.  Repeat imaging in 4 months, as well as cultures.    1/11/2022:  66-year-old female patient known to have past medical history of pulmonary SAE, diagnosed in January 2021 presents for follow-up.  The patient had been on 3 times weekly azithromycin, ethambutol, rifampin from January 2021 up until December 2021.  She had significant improvement in her imaging between January 2021 and April 2021.  However her sputum AFB cultures have remained positive up until most recently in September 2021.  Patient contacted our office again in December 2021 after noticing her cultures remained positive.  At that point, I had multiple discussions with the patient as documented in the chart prior to today's visit, and switched her medication regimen to daily administration of rifampin, ethambutol, azithromycin.  Most recent susceptibility testing shows isolate is still sensitive to macrolides.   She was also experiencing side effects from the rifampin intermittent dosing, and the shape of flulike symptoms.  Since switching to daily dosing of the medication, she reports that her symptoms have subsided.  She notes ongoing cough however no significant changes in its frequency and no increase in sputum production.  She denies any weight loss, no fevers.  No shortness of breath.  She also denies any abdominal pain, no diarrhea, no changes in the urine color or in the stool color.  She denies any fatigue and reports normal appetite.  Most recent blood work done 12/29/2021, showing mild hyperbilirubinemia.  Otherwise normal  LFTs. [1]      03/03/2022:   Patient presents today for follow-up.  She reports her cough is minimal and does not have any shortness of breath.  No fevers, no chills, no weight loss, no night sweats.  At our last visit we had paged her regimen from intermittent dosing to daily dosing of azithromycin, rifampin, ethambutol given that she has not cleared her sputum cultures yet despite about a year of therapy.  She reports good tolerability to the antimicrobials.    06/02/2022:  No fevers, no chills, continues to have a mild cough especially in the morning. Otherwise no weight loss and continues with activities of daily living. She is taking Rifampin, Azithromycin, Ethambutol daily at this time. Her most recent cultures collected 03/2022 remain positive after 3 months on daily regimen. Repeat CT scan stable from prior.    07/28/2022  Presents for follow up. Had COVID since last visit but only mild symptoms. Cx sputum from 06/2022 and 04/2022 remain positive for SAE despite 6 months of the daily dosing regimen. Susceptibilities from 04/2022 with no resistance to Macrolides and S to Aminoglycosides. Otherwise clinically feels well, no fevers, no chills.     11/16/2022:  Today for follow-up.  Unfortunately she has developed toxicity from her amikacin.  She reports that about 7-10 days prior to notifying us, she had started noticing some increased fatigue as well as decreased hearing acuity.  Initially did not think much of it as she was nearing the end of her therapy however these symptoms continued to worsen and she informed our office.  At that time around 10/20/2022, amikacin was discontinued.  Referral was made to audiology (after baseline audiology evaluation) and labs ordered by patient's primary care physician. Showing worsening anemia to 9.8 from 12 and ARNOL (creatinine of 1.8 from baseline 0.9). She does report some improvement since discontinuing amikacin.  Although her hearing continues to be affected, her  energy is improved and her appetite is back.  She reports no concerns with urination.  And all-in-all has been doing better since discontinuing Amkacin.  She continues to have the PICC line in place.    12/15/2022:  Since her last visit, she reports some improvement. She has more energy and is more active. She continues to have episodes of diarrhea which are not constant but are significant in terms of affecting her lifestyle. She followed up with audiology who reports her hearing loss is less likely related to Aminoglycoside however, given timing of worsening of hearing loss, it is likely that it contributed to that. She followed up with nephrology and her kidney function is recovering slowly, her GFR is at 41 most recently, she is due to repeat her labs in early January and follow up with nephrology as well as our office. Her repeat cultures collected 11/21/2022 have so far remained negative per my discussion with micro lab.     03/21/2023:  Kidney function improved with creatinine of 1.2 and GFR of 48. She is tolerating antimycobacterials better with no diarrhea since she started eating yogurt regularly. Currently without new concerns. She has negative AFB culture in 11/2022.      06/22/2023:  Kidney function appears to have stabilized with GFR 40-50 in the past 6 months. Patient has returned to her daily activity, she has no significant cough, no night sweats, no fevers, continues to have intermittent episodes of diarrhea but these have been better controlled. No new concerns, needs some refills on medication.     10/26/2023:  No new concerns.  Most recent kidney function appears to have returned to normal.  No fevers, no chills, cough is minimal, with minimal if any sputum production.  No weight loss, no night sweats, no chills.  She continues to have episodic diarrhea with the current medication.    01/30/2024:  Patient had repeat CT scan of the chest earlier this month after about 3 months off antimicrobials.   Unfortunately, this has shown some progression of disease and new infiltrates especially at the lower lobes bilaterally.  This was compared to a CT scan done back in 05/2023.  In addition, the patient reports that she has starting to have sputum production which is greenish with some thick material it, and cough that has been more persistent at this time.  No significant changes in her shortness of.  Denies any other respiratory symptoms.  No fevers, no chills, no weight loss, no night sweats.  Patient notes no change in her bowel habits, no changes in her urinary habits.    02/28/2024:  Patient presents today for follow-up, we repeated patient's sputum cultures at the previous visit, including 3 AFB cultures.  Typical respiratory cultures however were positive for moderate Pseudomonas aeruginosa that was pansensitive.  We prescribed her a week of ciprofloxacin.  She reports improvement in her symptoms after completion of the course.  Reports improved cough, improved shortness of breath.  However she does have ongoing tickle with repetitive clearing of her throat.  Otherwise, no new concerning symptoms.    03/27/2024:  She presents today for follow-up, she reports ongoing cough which has somewhat worsened, more sputum production, increased shortness of breath.  All tolerable however significantly worsened from prior.  Cultures resulted, noted to have MAC but also mixed cultures with other mycobacteria, mycobacterium avium complex which I suspect to be the major pathogen is susceptible to macrolide.  Of note, she started on Apixaban in 02/2024 after concerns for TIA and concern for SVT in the LLE.     04/10/2024:  Today for follow-up.  Started rifabutin, azithromycin, ethambutol 03/28/2024.  Since then, she reports some intermittent episodes of diarrhea, eating yogurts instead of probiotics but generally tolerating antibiotics. No changes in urinary habits, she reports that the last time she got her labs done she  had not drank or eaten anything in the morning which could account for the increase in her creatinine and drop in her renal function. Has some decreased cough but generally about the same. Still on Apixaban, tolerated Rifabutin well. No excessive bleeding episodes, no bruising.     2024:  Has been on one month of current treatment. Most noticeable side effect is diarrhea, seems to be more significant now on rifabutin than on Rifampin. No issues with her vision and will resume regular ocular checks for ethambutol toxicity. Cough remains, sputum production slightly improved but overall stable from prior. No significant shortness of breath.     Past Medical History:   Diagnosis Date    Anxiety     Arthritis     Depression     GERD (gastroesophageal reflux disease)     Hyperlipidemia     Hypertension         Past Surgical History:   Procedure Laterality Date    APPENDECTOMY       SECTION      ECTOPIC PREGNANCY SURGERY      FRACTURE SURGERY  2021    Wrist    FUNCTIONAL ENDOSCOPIC SINUS SURGERY (FESS) Bilateral     HERNIA REPAIR  1970s    MANDIBLE SURGERY Bilateral     PERIPHERALLY INSERTED CENTRAL CATHETER INSERTION N/A 2022    Procedure: INSERTION, PICC;  Surgeon: Ismael Stroud MD;  Location: [facility] OR;  Service: Infectious Disease;  Laterality: N/A;    TUBAL LIGATION  1989    WRIST FRACTURE SURGERY          Social History     Socioeconomic History    Marital status:    Tobacco Use    Smoking status: Former     Current packs/day: 1.00     Average packs/day: 1 pack/day for 18.0 years (18.0 ttl pk-yrs)     Types: Cigarettes    Smokeless tobacco: Never    Tobacco comments:     Quit in    Substance and Sexual Activity    Alcohol use: Not Currently     Comment: Rarely drink alcohol    Drug use: Not Currently    Sexual activity: Not Currently        Family History   Problem Relation Name Age of Onset    Asbestos Mother Jonna Agosto     Cancer Mother Jonna Agosto         Mesothelioma     "Suicide Father Abhishek Agosto     Depression Father Abhishek Agosto         Suicide    Depression Brother Evgeny Agosto         Suicide        Review of patient's allergies indicates:  No Known Allergies       There is no immunization history on file for this patient.     Review of Systems   All other systems reviewed and are negative.         Objective:      /70 (BP Location: Left arm, Patient Position: Sitting, BP Method: Medium (Automatic))   Pulse 75   Resp 20   Ht 5' 5" (1.651 m)   Wt 93 kg (205 lb)   BMI 34.11 kg/m²      Physical Exam  Constitutional:       Appearance: Normal appearance.   HENT:      Head: Normocephalic and atraumatic.      Mouth/Throat:      Pharynx: No oropharyngeal exudate or posterior oropharyngeal erythema.   Eyes:      Extraocular Movements: Extraocular movements intact.      Pupils: Pupils are equal, round, and reactive to light.   Cardiovascular:      Rate and Rhythm: Normal rate and regular rhythm.      Heart sounds: No murmur heard.  Pulmonary:      Effort: No respiratory distress.      Breath sounds: No wheezing, rhonchi or rales.   Abdominal:      General: Bowel sounds are normal. There is no distension.      Palpations: Abdomen is soft.      Tenderness: There is no abdominal tenderness. There is no right CVA tenderness or left CVA tenderness.   Musculoskeletal:         General: No swelling or tenderness.      Cervical back: Neck supple. No rigidity or tenderness.   Lymphadenopathy:      Cervical: No cervical adenopathy.   Skin:     Findings: No lesion or rash.   Neurological:      General: No focal deficit present.      Mental Status: She is alert and oriented to person, place, and time. Mental status is at baseline.      Cranial Nerves: No cranial nerve deficit.      Motor: No weakness.   Psychiatric:         Mood and Affect: Mood normal.         Behavior: Behavior normal.            Assessment:       Problem List Items Addressed This Visit          ENT    " Ototoxicity of both ears    Hearing loss    Relevant Orders    Ambulatory referral/consult to Audiology       Pulmonary    Chronic cough    Pulmonary emphysema    Bronchiectasis without complication       Renal/    CKD (chronic kidney disease)       ID    Mycobacterium avium complex - Primary    Relevant Medications    amikacin liposomal-neb.accessr (ARIKAYCE) 590 mg/8.4 mL NbSp    Other Relevant Orders    Ambulatory referral/consult to Audiology    CBC Auto Differential    Comprehensive Metabolic Panel                        Plan:     -Fairly healthy patient with obesity initially diagnosed with MAC by PCP in 2017. Reportedly received monotherapy with Azithromycin, ultimately referred to ID in 2020, CT done initially with scarring and bronchiectasis but no cavitation, started on intermittent dosing of Rifampin, Azithromycin and Ethambutol MW, completed about 1 year of this and did not achieve microbiologic clearance. imaging improved, some symptoms remained. 10/2021 cultures remained positive, transitioned to daily dosing of Rifampin, Azithromycin and Ethambutol. improved clinically. Stable imaging.  -Repeated cultures in 06/2022 remained positive for MAC. decided to add Amikacin IV, she almost completed a 3 month course of amikacin but unfortunately developed ARNOL and some acute ototoxicity on chronic hearing impairment towards the end of the course.   -Discontinued Amikacin and continued triple therapy otherwise with renal recovery although not back to baseline. repeat sputum culture in 11/2022 did not grow with patient barely coughing but noted volume was inadequate.  -Continued triple therapy for an additional year until 10/2023 and stopped at that time given significant improvement clinically and minimal if any cough, with minimal if any sputum production.   -Kidney function stabilized and on most recent check, has been fluctuating between normal and mild dysfunction   -Hearing aids have been successful in  helping her hearing loss    -at completion therapy on 10/26/2023, the patient had significantly improved, her cough has resolved, no sputum production., patient completed at that time a year of azithromycin, ethambutol, rifampin after completing initial 3 months of aminoglycosides on 10/2022  -antibiotics were discontinued at that time  -repeat a CT scan done on 01/04/2024 showed new consolidative opacities in the lower lobes compared to the 1 on 05/29/2023 which had stable findings of tree in bud and bronchiectasis compared to the CT prior  -at previous follow-up on 01/2024 she had recurrence of symptoms as well, she was treated or Pseudomonas aeruginosa in sputum cultures with a week of ciprofloxacin which yielded some improvement in the symptoms, only residual symptoms at this time is frequent clearing of the throat  -3 AFB cultures of the sputum repeated 02/08/2024, now with MAC growing as well as mixed colonies of other mycobacteria    -discussed this with these results with the patient, unfortunately it appears that we were not able to successfully eradicate her mycobacterium avium complex from the lungs, and she is again symptomatic.  -this has been a protracted course, the patient has had this infection for the past 5-6 years, despite aggressive therapy were not able to eradicated, however the patient was symptomatically doing much better and had noted some significant improvement during the course of treatment  -discussed the difficulty of eradication of the organism especially with a mixed colonies at this time, also discussed the downsides watchful waiting especially given worsening on imaging and with symptoms since discontinuation of antibiotics   -being started on apixaban, this will be challenging given interaction with antibiotics that we would have to use for MAC therapy namely rifampin   -therefore restart azithromycin 500 mg p.o. Q 24 hours   -rifabutin 150 mg p.o. Q 24 hours (lower dose started  due to impaired renal function)  -Will repeat CMP today and increase to 300mg PO q24h should the renal function be improved  -Ethambutol 15 milligrams/kilograms p.o. Q 24 hours, however given her BMI of 35, compromised renal function, will use adjusted body weight for calculations will start with 1000 mg Q 24 hours    -Seems like she tolerated Rifampin better than Rifabutin  -Will be stopping Apixaban in 2.5 weeks  -Would switch back to Rifampin 600mg PO q24h  given diarrhea currently more significant and likely related to Rifabutin once Apixaban therapy is completed  -given refractory in nature of this infection despite multiple treatment courses and prolonged period of time of the treatments, it is reasonable at this time to consider inhaled Arikayce  -although this medicine can be associated with some nephrotoxicity and ototoxicity, these are minimal and much less significant than any other form of amikacin  -we set up the patient for follow-up with audiology once a case treatments are started and we will continue monitoring labs very closely once air cases started as well  -it is to note that the patient does have mixed colonies in her lungs, 1 of which is very resistant mycobacterium, I do suspect the mycobacterium avium complex to be the 1 causing most of the damage, patient had good stability when we were treating it, only started to progress when treatment was discontinued  -we will continue to consider referral in person to [facility] given the refractory nature of her disease, we have obtained recommendations after initial virtual consultation which are in line with the above the patient may benefit from in person referral, will discuss this further at the next visit      Follow up in about 4 weeks (around 5/29/2024).    60 minutes of total time spent on the encounter, which includes face to face time and non-face to face time preparing to see the patient (eg, review of tests), Obtaining and/or  reviewing separately obtained history, Documenting clinical information in the electronic or other health record, Independently interpreting results (not separately reported) and communicating results to the patient/family/caregiver, or Care coordination (not separately reported).

## 2024-05-06 ENCOUNTER — TELEPHONE (OUTPATIENT)
Dept: INFECTIOUS DISEASES | Facility: CLINIC | Age: 69
End: 2024-05-06
Payer: MEDICARE

## 2024-05-06 RX ORDER — AMIKACIN 590 MG/8.4ML
1 SUSPENSION RESPIRATORY (INHALATION) DAILY
Qty: 30 EACH | Refills: 0 | Status: SHIPPED | OUTPATIENT
Start: 2024-05-06

## 2024-05-06 NOTE — TELEPHONE ENCOUNTER
Audiology referral sent to [facility].. fax [phone]  Phone number is [phone]

## 2024-05-08 ENCOUNTER — LAB VISIT (OUTPATIENT)
Dept: LAB | Facility: HOSPITAL | Age: 69
End: 2024-05-08
Attending: GENERAL PRACTICE
Payer: MEDICARE

## 2024-05-08 DIAGNOSIS — A31.0 MYCOBACTERIUM AVIUM COMPLEX: ICD-10-CM

## 2024-05-08 LAB
ALBUMIN SERPL-MCNC: 3.9 G/DL (ref 3.4–4.8)
ALBUMIN/GLOB SERPL: 1.1 RATIO (ref 1.1–2)
ALP SERPL-CCNC: 29 UNIT/L (ref 40–150)
ALT SERPL-CCNC: 14 UNIT/L (ref 0–55)
AST SERPL-CCNC: 22 UNIT/L (ref 5–34)
BASOPHILS # BLD AUTO: 0.02 X10(3)/MCL
BASOPHILS NFR BLD AUTO: 0.4 %
BILIRUB SERPL-MCNC: 0.4 MG/DL
BUN SERPL-MCNC: 20.3 MG/DL (ref 9.8–20.1)
CALCIUM SERPL-MCNC: 9.7 MG/DL (ref 8.4–10.2)
CHLORIDE SERPL-SCNC: 101 MMOL/L (ref 98–107)
CO2 SERPL-SCNC: 26 MMOL/L (ref 23–31)
CREAT SERPL-MCNC: 1.21 MG/DL (ref 0.55–1.02)
EOSINOPHIL # BLD AUTO: 0.24 X10(3)/MCL (ref 0–0.9)
EOSINOPHIL NFR BLD AUTO: 5.1 %
ERYTHROCYTE [DISTWIDTH] IN BLOOD BY AUTOMATED COUNT: 13.4 % (ref 11.5–17)
GFR SERPLBLD CREATININE-BSD FMLA CKD-EPI: 48 ML/MIN/1.73/M2
GLOBULIN SER-MCNC: 3.5 GM/DL (ref 2.4–3.5)
GLUCOSE SERPL-MCNC: 129 MG/DL (ref 82–115)
HCT VFR BLD AUTO: 35.2 % (ref 37–47)
HGB BLD-MCNC: 11.5 G/DL (ref 12–16)
IMM GRANULOCYTES # BLD AUTO: 0.03 X10(3)/MCL (ref 0–0.04)
IMM GRANULOCYTES NFR BLD AUTO: 0.6 %
LYMPHOCYTES # BLD AUTO: 0.82 X10(3)/MCL (ref 0.6–4.6)
LYMPHOCYTES NFR BLD AUTO: 17.6 %
MCH RBC QN AUTO: 29.9 PG (ref 27–31)
MCHC RBC AUTO-ENTMCNC: 32.7 G/DL (ref 33–36)
MCV RBC AUTO: 91.4 FL (ref 80–94)
MONOCYTES # BLD AUTO: 0.56 X10(3)/MCL (ref 0.1–1.3)
MONOCYTES NFR BLD AUTO: 12 %
NEUTROPHILS # BLD AUTO: 3 X10(3)/MCL (ref 2.1–9.2)
NEUTROPHILS NFR BLD AUTO: 64.3 %
NRBC BLD AUTO-RTO: 0 %
PLATELET # BLD AUTO: 273 X10(3)/MCL (ref 130–400)
PMV BLD AUTO: 10.1 FL (ref 7.4–10.4)
POTASSIUM SERPL-SCNC: 4.3 MMOL/L (ref 3.5–5.1)
PROT SERPL-MCNC: 7.4 GM/DL (ref 5.8–7.6)
RBC # BLD AUTO: 3.85 X10(6)/MCL (ref 4.2–5.4)
SODIUM SERPL-SCNC: 135 MMOL/L (ref 136–145)
WBC # SPEC AUTO: 4.67 X10(3)/MCL (ref 4.5–11.5)

## 2024-05-08 PROCEDURE — 80053 COMPREHEN METABOLIC PANEL: CPT

## 2024-05-08 PROCEDURE — 85025 COMPLETE CBC W/AUTO DIFF WBC: CPT

## 2024-05-08 PROCEDURE — 36415 COLL VENOUS BLD VENIPUNCTURE: CPT

## 2024-05-09 ENCOUNTER — TELEPHONE (OUTPATIENT)
Dept: INFECTIOUS DISEASES | Facility: CLINIC | Age: 69
End: 2024-05-09
Payer: MEDICARE

## 2024-05-10 PROBLEM — H91.90 HEARING LOSS: Status: ACTIVE | Noted: 2024-05-10

## 2024-05-15 ENCOUNTER — LAB VISIT (OUTPATIENT)
Dept: LAB | Facility: HOSPITAL | Age: 69
End: 2024-05-15
Attending: GENERAL PRACTICE
Payer: MEDICARE

## 2024-05-15 DIAGNOSIS — A31.0 MYCOBACTERIUM AVIUM COMPLEX: ICD-10-CM

## 2024-05-15 LAB
ALBUMIN SERPL-MCNC: 3.8 G/DL (ref 3.4–4.8)
ALBUMIN/GLOB SERPL: 1.1 RATIO (ref 1.1–2)
ALP SERPL-CCNC: 31 UNIT/L (ref 40–150)
ALT SERPL-CCNC: 16 UNIT/L (ref 0–55)
AST SERPL-CCNC: 22 UNIT/L (ref 5–34)
BASOPHILS # BLD AUTO: 0.02 X10(3)/MCL
BASOPHILS NFR BLD AUTO: 0.5 %
BILIRUB SERPL-MCNC: 0.3 MG/DL
BUN SERPL-MCNC: 23.5 MG/DL (ref 9.8–20.1)
CALCIUM SERPL-MCNC: 9 MG/DL (ref 8.4–10.2)
CHLORIDE SERPL-SCNC: 103 MMOL/L (ref 98–107)
CO2 SERPL-SCNC: 26 MMOL/L (ref 23–31)
CREAT SERPL-MCNC: 1.2 MG/DL (ref 0.55–1.02)
EOSINOPHIL # BLD AUTO: 0.25 X10(3)/MCL (ref 0–0.9)
EOSINOPHIL NFR BLD AUTO: 5.8 %
ERYTHROCYTE [DISTWIDTH] IN BLOOD BY AUTOMATED COUNT: 13.5 % (ref 11.5–17)
GFR SERPLBLD CREATININE-BSD FMLA CKD-EPI: 48 ML/MIN/1.73/M2
GLOBULIN SER-MCNC: 3.4 GM/DL (ref 2.4–3.5)
GLUCOSE SERPL-MCNC: 122 MG/DL (ref 82–115)
HCT VFR BLD AUTO: 36.4 % (ref 37–47)
HGB BLD-MCNC: 11.5 G/DL (ref 12–16)
IMM GRANULOCYTES # BLD AUTO: 0.02 X10(3)/MCL (ref 0–0.04)
IMM GRANULOCYTES NFR BLD AUTO: 0.5 %
LYMPHOCYTES # BLD AUTO: 0.94 X10(3)/MCL (ref 0.6–4.6)
LYMPHOCYTES NFR BLD AUTO: 21.9 %
MCH RBC QN AUTO: 29.5 PG (ref 27–31)
MCHC RBC AUTO-ENTMCNC: 31.6 G/DL (ref 33–36)
MCV RBC AUTO: 93.3 FL (ref 80–94)
MONOCYTES # BLD AUTO: 0.5 X10(3)/MCL (ref 0.1–1.3)
MONOCYTES NFR BLD AUTO: 11.6 %
NEUTROPHILS # BLD AUTO: 2.57 X10(3)/MCL (ref 2.1–9.2)
NEUTROPHILS NFR BLD AUTO: 59.7 %
NRBC BLD AUTO-RTO: 0 %
PLATELET # BLD AUTO: 237 X10(3)/MCL (ref 130–400)
PMV BLD AUTO: 10.4 FL (ref 7.4–10.4)
POTASSIUM SERPL-SCNC: 4.3 MMOL/L (ref 3.5–5.1)
PROT SERPL-MCNC: 7.2 GM/DL (ref 5.8–7.6)
RBC # BLD AUTO: 3.9 X10(6)/MCL (ref 4.2–5.4)
SODIUM SERPL-SCNC: 135 MMOL/L (ref 136–145)
WBC # SPEC AUTO: 4.3 X10(3)/MCL (ref 4.5–11.5)

## 2024-05-15 PROCEDURE — 85025 COMPLETE CBC W/AUTO DIFF WBC: CPT

## 2024-05-15 PROCEDURE — 36415 COLL VENOUS BLD VENIPUNCTURE: CPT

## 2024-05-15 PROCEDURE — 80053 COMPREHEN METABOLIC PANEL: CPT

## 2024-05-22 ENCOUNTER — LAB VISIT (OUTPATIENT)
Dept: LAB | Facility: HOSPITAL | Age: 69
End: 2024-05-22
Attending: GENERAL PRACTICE
Payer: MEDICARE

## 2024-05-22 DIAGNOSIS — A31.0 MYCOBACTERIUM AVIUM COMPLEX: ICD-10-CM

## 2024-05-22 LAB
ALBUMIN SERPL-MCNC: 3.8 G/DL (ref 3.4–4.8)
ALBUMIN/GLOB SERPL: 1.1 RATIO (ref 1.1–2)
ALP SERPL-CCNC: 27 UNIT/L (ref 40–150)
ALT SERPL-CCNC: 21 UNIT/L (ref 0–55)
ANION GAP SERPL CALC-SCNC: 9 MEQ/L
AST SERPL-CCNC: 29 UNIT/L (ref 5–34)
BASOPHILS # BLD AUTO: 0.02 X10(3)/MCL
BASOPHILS NFR BLD AUTO: 0.5 %
BILIRUB SERPL-MCNC: 0.4 MG/DL
BUN SERPL-MCNC: 19.2 MG/DL (ref 9.8–20.1)
CALCIUM SERPL-MCNC: 9 MG/DL (ref 8.4–10.2)
CHLORIDE SERPL-SCNC: 103 MMOL/L (ref 98–107)
CO2 SERPL-SCNC: 24 MMOL/L (ref 23–31)
CREAT SERPL-MCNC: 1.24 MG/DL (ref 0.55–1.02)
CREAT/UREA NIT SERPL: 15
EOSINOPHIL # BLD AUTO: 0.14 X10(3)/MCL (ref 0–0.9)
EOSINOPHIL NFR BLD AUTO: 3.4 %
ERYTHROCYTE [DISTWIDTH] IN BLOOD BY AUTOMATED COUNT: 13.2 % (ref 11.5–17)
GFR SERPLBLD CREATININE-BSD FMLA CKD-EPI: 47 ML/MIN/1.73/M2
GLOBULIN SER-MCNC: 3.4 GM/DL (ref 2.4–3.5)
GLUCOSE SERPL-MCNC: 127 MG/DL (ref 82–115)
HCT VFR BLD AUTO: 37.1 % (ref 37–47)
HGB BLD-MCNC: 11.7 G/DL (ref 12–16)
IMM GRANULOCYTES # BLD AUTO: 0.02 X10(3)/MCL (ref 0–0.04)
IMM GRANULOCYTES NFR BLD AUTO: 0.5 %
LYMPHOCYTES # BLD AUTO: 0.91 X10(3)/MCL (ref 0.6–4.6)
LYMPHOCYTES NFR BLD AUTO: 21.9 %
MCH RBC QN AUTO: 29.3 PG (ref 27–31)
MCHC RBC AUTO-ENTMCNC: 31.5 G/DL (ref 33–36)
MCV RBC AUTO: 92.8 FL (ref 80–94)
MONOCYTES # BLD AUTO: 0.45 X10(3)/MCL (ref 0.1–1.3)
MONOCYTES NFR BLD AUTO: 10.8 %
NEUTROPHILS # BLD AUTO: 2.62 X10(3)/MCL (ref 2.1–9.2)
NEUTROPHILS NFR BLD AUTO: 62.9 %
NRBC BLD AUTO-RTO: 0 %
PLATELET # BLD AUTO: 231 X10(3)/MCL (ref 130–400)
PMV BLD AUTO: 10.5 FL (ref 7.4–10.4)
POTASSIUM SERPL-SCNC: 4.1 MMOL/L (ref 3.5–5.1)
PROT SERPL-MCNC: 7.2 GM/DL (ref 5.8–7.6)
RBC # BLD AUTO: 4 X10(6)/MCL (ref 4.2–5.4)
SODIUM SERPL-SCNC: 136 MMOL/L (ref 136–145)
WBC # SPEC AUTO: 4.16 X10(3)/MCL (ref 4.5–11.5)

## 2024-05-22 PROCEDURE — 80053 COMPREHEN METABOLIC PANEL: CPT

## 2024-05-22 PROCEDURE — 36415 COLL VENOUS BLD VENIPUNCTURE: CPT

## 2024-05-22 PROCEDURE — 85025 COMPLETE CBC W/AUTO DIFF WBC: CPT

## 2024-05-23 DIAGNOSIS — A31.0 MYCOBACTERIUM AVIUM COMPLEX: Primary | ICD-10-CM

## 2024-05-23 RX ORDER — RIFAMPIN 300 MG/1
600 CAPSULE ORAL DAILY
Qty: 60 CAPSULE | Refills: 5 | Status: SHIPPED | OUTPATIENT
Start: 2024-05-23

## 2024-05-23 NOTE — PROGRESS NOTES
Completed Apixaban, will start Rifampin and discontinue Rifabutin. Continues to cough and having diarrhea. Did not receive a call from Bhavani, will follow up. She is scheduled to see me next week.     Ismael Stroud MD  Infectious Disease  [facility]

## 2024-05-29 ENCOUNTER — OFFICE VISIT (OUTPATIENT)
Dept: INFECTIOUS DISEASES | Facility: CLINIC | Age: 69
End: 2024-05-29
Payer: MEDICARE

## 2024-05-29 VITALS
DIASTOLIC BLOOD PRESSURE: 74 MMHG | HEIGHT: 65 IN | WEIGHT: 204 LBS | SYSTOLIC BLOOD PRESSURE: 133 MMHG | HEART RATE: 66 BPM | BODY MASS INDEX: 33.99 KG/M2 | RESPIRATION RATE: 18 BRPM | OXYGEN SATURATION: 96 %

## 2024-05-29 DIAGNOSIS — R05.3 CHRONIC COUGH: ICD-10-CM

## 2024-05-29 DIAGNOSIS — A31.0 MYCOBACTERIUM AVIUM COMPLEX: Primary | ICD-10-CM

## 2024-05-29 DIAGNOSIS — N18.9 CHRONIC KIDNEY DISEASE, UNSPECIFIED CKD STAGE: ICD-10-CM

## 2024-05-29 DIAGNOSIS — A31.0 PULMONARY MYCOBACTERIUM AVIUM COMPLEX (MAC) INFECTION: ICD-10-CM

## 2024-05-29 DIAGNOSIS — J47.9 BRONCHIECTASIS WITHOUT COMPLICATION: ICD-10-CM

## 2024-05-29 DIAGNOSIS — H91.90 HEARING LOSS, UNSPECIFIED HEARING LOSS TYPE, UNSPECIFIED LATERALITY: ICD-10-CM

## 2024-05-29 PROCEDURE — 99999 PR PBB SHADOW E&M-EST. PATIENT-LVL IV: CPT | Mod: PBBFAC,,, | Performed by: GENERAL PRACTICE

## 2024-05-29 PROCEDURE — 99215 OFFICE O/P EST HI 40 MIN: CPT | Mod: S$PBB,,, | Performed by: GENERAL PRACTICE

## 2024-05-29 PROCEDURE — 99214 OFFICE O/P EST MOD 30 MIN: CPT | Mod: PBBFAC | Performed by: GENERAL PRACTICE

## 2024-05-29 RX ORDER — RIFABUTIN 150 MG/1
1 CAPSULE ORAL DAILY
COMMUNITY

## 2024-05-29 NOTE — PATIENT INSTRUCTIONS
Aerobika OPEP Oscillating Positive Expiratory Pressure Therapy System    Airway Clearance Techniques and Devices  Medications ([email or web address])     This information was reviewed and approved by Ellen Lopez RRT (11/1/2019).  People with some lung conditions such as cystic fibrosis, bronchiectasis with nontuberculous mycobacterial (NTM) disease and COPD often produce a large amount of mucus. If the mucus is allowed to collect in the airways, breathing may become difficult and infection may occur. The Aerobika airway clearance device is a technique to help mobilize mucus from the airways.     How to Use the Aerobika    Assure proper setting of the resistance indicator on the Aerobika. This is on the same side as  the mouthpiece. Your health care provider will set the indicator when you get your Aerobika. Move the resistance indicator toward the + to increase resistance. Shift the indicator toward - to decrease resistance.  Sit up with good posture to use the Aerobika. Sometimes we may have you lie in a postural drainage position to use the Aerobika. Postural drainage positions include lying flat on your back, and alternating right and left sides. Your health care provider will show you postural drainage positions to use if these are recommended.  Place the Aerobika mouthpiece in your mouth. Seal your lips tightly around the mouthpiece.  Take in a fairly deep breath and hold it for about 2-3 seconds.  Exhale actively (but not too forcefully) through the mouthpiece until you no longer hear the flutter. Keep your cheeks as firm as possible when you exhale.  Repeat this maneuver for 10-20 breaths. Try to resist coughing during this phase.  Follow this cycle:  10-20 blows  2-3 huffs  One big cough to bring the sputum up and out. Try not to swallow the mucus.  Carrizales coughing is a type of coughing if you have trouble clearing your mucus. Take a breath that is slightly deeper than normal. Use your stomach muscles to make  a series of 3 exhalations with the airway open, making a ha, ha, ha sound. Follow this by normal breathing and a deep cough if you feel mucus moving.  Repeat cycle for 10-15 minutes 2 to 4 times a day (or as prescribed by your health care provider).  Your health care provider will tell you how many times a day to use the Aerobika.  If you use a short acting inhaled bronchodilator, use the Aerobika 15 minutes after you inhale the medicine.  Watch a YouTube video on how to use the device:        How to Use Your Aerobika Oscillating PEP with Your Nebulizer    The AerobiKA Oscillating PEP device may also be used with a small volume nebulizer. Your healthcare provider will advise which medicine to use for combined treatments.  Medicines which open your airways (e.g. albuterol) or help to thin mucus (e.g. hypertonic saline) may be used with your Aerobika Oscillating PEP because they help remove or thin the mucus in your lungs.  Medicines you want to stay in your lungs, like antibiotics, are not recommended to be used with your Aerobika/ Nebulizer.  Remove the mouthpiece (or mask) from the nebulizer.  Attach the nebulizer to the nebulizer port of the Aerobika Oscillating PEP.  Place the medicine or hypertonic saline in the nebulizer cup.  Ensure your nebulizer is set-up correctly and medication has been added to the nebulizer cup.  Switch on the compressed air source, and complete your combined treatment using the breathing technique outlined above.  Make sure you have read and understood instructions provided prior to your treatment.     Daily Cleaning    Wash your device with warm soap and water at the end of each day.  Soak the four disassembled parts of the device for 15 minutes. Agitate gently.  Rinse the parts in warm water.  To dry, shake the parts to remove any excess water.   (cleaning with overly dirty dishes is not recommended)  The four disassembled parts may be washed in a  by securing  the parts in a basket on the top rack. Wash them in a normal wash cycle with  detergent and rinse aid.  Always allow parts to air dry thoroughly before you reassemble the device.      Reassembling the Device    Insert the valve cartridge into the bottom case. If it does not slip easily into place, rotate 180° and try again.  Connect the top and bottom cases until you hear both Attachment Tabs click (this may occur simultaneously).  Ensure the tabs are engaged by firmly pressing the two halves together.  Reattach the mouthpiece.     Disinfecting the Device    The Aerobika Oscillating PEP device should be disinfected weekly by using any of the methods listed below.  If considered clinically necessary for the patients disease state, the device may be sterilized after each use.  Prior to disinfection, follow cleaning instructions. Distilled water can be used to avoid build-up from mineral rich tap water.      Boiling  Place the four disassembled parts in a colander to prevent plastic parts from coming in contact with the hot bottom and gently place into boiling water for 5 minutes.  Carefully remove parts from the water and allow to cool.     Microwave Steam Bag  The four disassembled parts may be disinfected in the microwave in a steam cleaning bag (i.e. Quick-Clean MicroSteam bag) following the s instructions using distilled water.  Carefully remove from bag and allow parts to cool.     Isopropyl Alcohol (70%)  The four disassembled parts may be soaked in 70% isopropyl alcohol for 5 minutes, rinse well.     Hydrogen Peroxide (3%)  The four disassembled parts may be soaked in 3% Hydrogen Peroxide for 30 minutes, rinse well.     Always allow parts to air dry thoroughly before you reassemble the device.   CAUTION: Failure to follow the cleaning or disinfecting instructions may have an adverse effect on device performance.  NOTES:  This is a SINGLE PATIENT USE device.  Do not share this medical  "device.  This product should be replaced after 6 months of use or immediately if damaged.     How to Replace the AerobiKa    You have options to replace the Aerobika: To replace the Aerobika you need a prescription from your local health care provider. The prescription should read: "Aerobika PEP Device for airway clearance."  Prescriptions can be sent to:   [facility]  Toll Free #: [phone]  Fax #: [phone]  [email or web address](Opens in a new window)  Aerobika® Oscillating Positive Expiratory Pressure Therapy System  AeroEclipse® XL Reusable Breath Actuated Nebulizer (R BAN)  Ombra e®  Table Top Compressor     [facility] Customer Service    [phone]  [employer]  Acapella e®  DH, Acapella e®  DM  "

## 2024-05-29 NOTE — Clinical Note
Patient was referred to [facility] in [place]. Please fax copy of most recent office note to [phone]. Thank you

## 2024-05-29 NOTE — PROGRESS NOTES
"Subjective:       Patient ID: Carmen Mckinley 69 y.o.     Chief Complaint:   Chief Complaint   Patient presents with    4 week f/u Mycobacterium avium complex        HPI:   12/08/2020 (initial evaluation by Dr. Brooks)  The patient is a 65 year old female who is referred to me from her PCP for evaluation of mycobacterial pulmonary infection. It appears that she has been suffering from chronic cough, her symptoms initially started about 3 years ago. She was then treated for "mycobacterial disease" with Azithromycin for about 6 months with improvement of her symptoms. however after stopping abx her symptoms recurred. she has since then been treated with a few regimens of abx (short courses, never combination Rx) without periodic and temporary improvement, only to recur. She denies any fevers, chills, sweats, chest pains (except when her cough is bad). She is here for evaluation and possible treatment.  She had imaging and cultures done at [facility] back in August (the latest), we didn't have the results of these tests with the referral package. She denies any other complaints today.  Will get the latest cultures and imaging studies from [facility], as the diagnosis and management will depend on those. specially in light of her prior treatment history.  would like to get new AFB smears and cultures prior to starting therapy  treatment will depend on the clinical, radiologic, and micro data    01/21/2021:  The patient is a pleasant 66-year-old female with chronic cough and Mycobacterium AVM disease, follow-up appointment, we have obtained cultures and lung imaging for her. Her cultures were positive for MAC and this was still clarithromycin sensitive which is good news. 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 She continues to have cough that is chronically productive, daily and nightly, she denies having any fevers or chills or sweats or other complaints today. She is " ready to start treatment.  The patient meets clinical, radiologic, and culture criteria for disease, and thus warrants treatment.  Rx for reticulonodular disease as follows:  Azithromycin 500mg  Ethambutol 2g  Rifampin 600mg  all orally, and three times a week  I discussed each of these meds possible common side effects with the patient and answered all her questions, she is in agreement with the plan.  Repeat imaging in 4 months, as well as cultures.    1/11/2022:  66-year-old female patient known to have past medical history of pulmonary SAE, diagnosed in January 2021 presents for follow-up.  The patient had been on 3 times weekly azithromycin, ethambutol, rifampin from January 2021 up until December 2021.  She had significant improvement in her imaging between January 2021 and April 2021.  However her sputum AFB cultures have remained positive up until most recently in September 2021.  Patient contacted our office again in December 2021 after noticing her cultures remained positive.  At that point, I had multiple discussions with the patient as documented in the chart prior to today's visit, and switched her medication regimen to daily administration of rifampin, ethambutol, azithromycin.  Most recent susceptibility testing shows isolate is still sensitive to macrolides.   She was also experiencing side effects from the rifampin intermittent dosing, and the shape of flulike symptoms.  Since switching to daily dosing of the medication, she reports that her symptoms have subsided.  She notes ongoing cough however no significant changes in its frequency and no increase in sputum production.  She denies any weight loss, no fevers.  No shortness of breath.  She also denies any abdominal pain, no diarrhea, no changes in the urine color or in the stool color.  She denies any fatigue and reports normal appetite.  Most recent blood work done 12/29/2021, showing mild hyperbilirubinemia.  Otherwise normal LFTs.  [1]      03/03/2022:   Patient presents today for follow-up.  She reports her cough is minimal and does not have any shortness of breath.  No fevers, no chills, no weight loss, no night sweats.  At our last visit we had paged her regimen from intermittent dosing to daily dosing of azithromycin, rifampin, ethambutol given that she has not cleared her sputum cultures yet despite about a year of therapy.  She reports good tolerability to the antimicrobials.    06/02/2022:  No fevers, no chills, continues to have a mild cough especially in the morning. Otherwise no weight loss and continues with activities of daily living. She is taking Rifampin, Azithromycin, Ethambutol daily at this time. Her most recent cultures collected 03/2022 remain positive after 3 months on daily regimen. Repeat CT scan stable from prior.    07/28/2022  Presents for follow up. Had COVID since last visit but only mild symptoms. Cx sputum from 06/2022 and 04/2022 remain positive for SAE despite 6 months of the daily dosing regimen. Susceptibilities from 04/2022 with no resistance to Macrolides and S to Aminoglycosides. Otherwise clinically feels well, no fevers, no chills.     11/16/2022:  Today for follow-up.  Unfortunately she has developed toxicity from her amikacin.  She reports that about 7-10 days prior to notifying us, she had started noticing some increased fatigue as well as decreased hearing acuity.  Initially did not think much of it as she was nearing the end of her therapy however these symptoms continued to worsen and she informed our office.  At that time around 10/20/2022, amikacin was discontinued.  Referral was made to audiology (after baseline audiology evaluation) and labs ordered by patient's primary care physician. Showing worsening anemia to 9.8 from 12 and ARNOL (creatinine of 1.8 from baseline 0.9). She does report some improvement since discontinuing amikacin.  Although her hearing continues to be affected, her energy is  improved and her appetite is back.  She reports no concerns with urination.  And all-in-all has been doing better since discontinuing Amkacin.  She continues to have the PICC line in place.    12/15/2022:  Since her last visit, she reports some improvement. She has more energy and is more active. She continues to have episodes of diarrhea which are not constant but are significant in terms of affecting her lifestyle. She followed up with audiology who reports her hearing loss is less likely related to Aminoglycoside however, given timing of worsening of hearing loss, it is likely that it contributed to that. She followed up with nephrology and her kidney function is recovering slowly, her GFR is at 41 most recently, she is due to repeat her labs in early January and follow up with nephrology as well as our office. Her repeat cultures collected 11/21/2022 have so far remained negative per my discussion with micro lab.     03/21/2023:  Kidney function improved with creatinine of 1.2 and GFR of 48. She is tolerating antimycobacterials better with no diarrhea since she started eating yogurt regularly. Currently without new concerns. She has negative AFB culture in 11/2022.      06/22/2023:  Kidney function appears to have stabilized with GFR 40-50 in the past 6 months. Patient has returned to her daily activity, she has no significant cough, no night sweats, no fevers, continues to have intermittent episodes of diarrhea but these have been better controlled. No new concerns, needs some refills on medication.     10/26/2023:  No new concerns.  Most recent kidney function appears to have returned to normal.  No fevers, no chills, cough is minimal, with minimal if any sputum production.  No weight loss, no night sweats, no chills.  She continues to have episodic diarrhea with the current medication.    01/30/2024:  Patient had repeat CT scan of the chest earlier this month after about 3 months off antimicrobials.   Unfortunately, this has shown some progression of disease and new infiltrates especially at the lower lobes bilaterally.  This was compared to a CT scan done back in 05/2023.  In addition, the patient reports that she has starting to have sputum production which is greenish with some thick material it, and cough that has been more persistent at this time.  No significant changes in her shortness of.  Denies any other respiratory symptoms.  No fevers, no chills, no weight loss, no night sweats.  Patient notes no change in her bowel habits, no changes in her urinary habits.    02/28/2024:  Patient presents today for follow-up, we repeated patient's sputum cultures at the previous visit, including 3 AFB cultures.  Typical respiratory cultures however were positive for moderate Pseudomonas aeruginosa that was pansensitive.  We prescribed her a week of ciprofloxacin.  She reports improvement in her symptoms after completion of the course.  Reports improved cough, improved shortness of breath.  However she does have ongoing tickle with repetitive clearing of her throat.  Otherwise, no new concerning symptoms.    03/27/2024:  She presents today for follow-up, she reports ongoing cough which has somewhat worsened, more sputum production, increased shortness of breath.  All tolerable however significantly worsened from prior.  Cultures resulted, noted to have MAC but also mixed cultures with other mycobacteria, mycobacterium avium complex which I suspect to be the major pathogen is susceptible to macrolide.  Of note, she started on Apixaban in 02/2024 after concerns for TIA and concern for SVT in the LLE.     04/10/2024:  Today for follow-up.  Started rifabutin, azithromycin, ethambutol 03/28/2024.  Since then, she reports some intermittent episodes of diarrhea, eating yogurts instead of probiotics but generally tolerating antibiotics. No changes in urinary habits, she reports that the last time she got her labs done she  had not drank or eaten anything in the morning which could account for the increase in her creatinine and drop in her renal function. Has some decreased cough but generally about the same. Still on Apixaban, tolerated Rifabutin well. No excessive bleeding episodes, no bruising.     2024:  Has been on one month of current treatment. Most noticeable side effect is diarrhea, seems to be more significant now on rifabutin than on Rifampin. No issues with her vision and will resume regular ocular checks for ethambutol toxicity. Cough remains, sputum production slightly improved but overall stable from prior. No significant shortness of breath.     2024:  Has been on 2 months of therapy with Rifabutin, Ethambutol and Azithromycin. continues to have yellow sputum production and a lot of cough. She has also been having diarrhea 2-3 episodes a few hours after medication but then resolves throughout the day. She did not have this with Rifampin and would like to try switching back to Rifampin now that she is off Apixaban. She is otherwise stable. We continue working on obtaining Arikayce for her. Discussed with Ms. Mckinley possibility of referral to [facility] given the refractory nature of her disease ans she has been on therapy for about 3-4 years and has not had any significant improvement. She would like us to proceed with referral.      Past Medical History:   Diagnosis Date    Anxiety     Arthritis     Depression     GERD (gastroesophageal reflux disease)     Hyperlipidemia     Hypertension         Past Surgical History:   Procedure Laterality Date    APPENDECTOMY       SECTION      ECTOPIC PREGNANCY SURGERY      FRACTURE SURGERY  2021    Wrist    FUNCTIONAL ENDOSCOPIC SINUS SURGERY (FESS) Bilateral     HERNIA REPAIR  1970s    MANDIBLE SURGERY Bilateral     PERIPHERALLY INSERTED CENTRAL CATHETER INSERTION N/A 2022    Procedure: INSERTION, PICC;  Surgeon: Ismael Stroud MD;  Location:  "[facility] OR;  Service: Infectious Disease;  Laterality: N/A;    TUBAL LIGATION  05-    WRIST FRACTURE SURGERY          Social History     Socioeconomic History    Marital status:    Tobacco Use    Smoking status: Former     Current packs/day: 1.00     Average packs/day: 1 pack/day for 18.0 years (18.0 ttl pk-yrs)     Types: Cigarettes    Smokeless tobacco: Never    Tobacco comments:     Quit in 1992   Substance and Sexual Activity    Alcohol use: Not Currently     Comment: Rarely drink alcohol    Drug use: Not Currently    Sexual activity: Not Currently        Family History   Problem Relation Name Age of Onset    Asbestos Mother Jonna Agosto     Cancer Mother Jonna Agosto         Mesothelioma    Suicide Father bAhishek Agosto     Depression Father Abhishek Agosto         Suicide    Depression Brother Evgeny Agosto         Suicide        Review of patient's allergies indicates:  No Known Allergies       There is no immunization history on file for this patient.     Review of Systems   All other systems reviewed and are negative.         Objective:      /74 (BP Location: Left arm)   Pulse 66   Resp 18   Ht 5' 5" (1.651 m)   Wt 92.5 kg (204 lb)   SpO2 96%   BMI 33.95 kg/m²      Physical Exam  Constitutional:       Appearance: Normal appearance.   HENT:      Head: Normocephalic and atraumatic.      Mouth/Throat:      Pharynx: No oropharyngeal exudate or posterior oropharyngeal erythema.   Eyes:      Extraocular Movements: Extraocular movements intact.      Pupils: Pupils are equal, round, and reactive to light.   Cardiovascular:      Rate and Rhythm: Normal rate and regular rhythm.      Heart sounds: No murmur heard.  Pulmonary:      Effort: No respiratory distress.      Breath sounds: No wheezing, rhonchi or rales.   Abdominal:      General: Bowel sounds are normal. There is no distension.      Palpations: Abdomen is soft.      Tenderness: There is no abdominal tenderness. There is no " right CVA tenderness or left CVA tenderness.   Musculoskeletal:         General: No swelling or tenderness.      Cervical back: Neck supple. No rigidity or tenderness.   Lymphadenopathy:      Cervical: No cervical adenopathy.   Skin:     Findings: No lesion or rash.   Neurological:      General: No focal deficit present.      Mental Status: She is alert and oriented to person, place, and time. Mental status is at baseline.      Cranial Nerves: No cranial nerve deficit.      Motor: No weakness.   Psychiatric:         Mood and Affect: Mood normal.         Behavior: Behavior normal.            Assessment:       Problem List Items Addressed This Visit          ENT    Hearing loss       Pulmonary    Chronic cough    Bronchiectasis without complication       Renal/    CKD (chronic kidney disease)       ID    Pulmonary Mycobacterium avium complex (MAC) infection    Mycobacterium avium complex - Primary            Plan:     -Fairly healthy patient with obesity initially diagnosed with MAC by PCP in 2017. Reportedly received monotherapy with Azithromycin, ultimately referred to ID in 2020, CT done initially with scarring and bronchiectasis but no cavitation, started on intermittent dosing of Rifampin, Azithromycin and Ethambutol MW, completed about 1 year of this and did not achieve microbiologic clearance. imaging improved, some symptoms remained. 10/2021 cultures remained positive, transitioned to daily dosing of Rifampin, Azithromycin and Ethambutol. improved clinically. Stable imaging.  -Repeated cultures in 06/2022 remained positive for MAC. decided to add Amikacin IV, she almost completed a 3 month course of amikacin but unfortunately developed ARNOL and some acute ototoxicity on chronic hearing impairment towards the end of the course.   -Discontinued Amikacin and continued triple therapy otherwise with renal recovery although not back to baseline. repeat sputum culture in 11/2022 did not grow with patient barely  coughing but noted volume was inadequate.  -Continued triple therapy for an additional year until 10/2023 and stopped at that time given significant improvement clinically and minimal if any cough, with minimal if any sputum production.   -Kidney function stabilized and on most recent check, has been fluctuating between normal and mild dysfunction   -Hearing aids have been successful in helping her hearing loss    -at completion therapy on 10/26/2023, the patient had significantly improved, her cough has resolved, no sputum production., patient completed at that time a year of azithromycin, ethambutol, rifampin after completing initial 3 months of aminoglycosides on 10/2022  -antibiotics were discontinued at that time  -repeat a CT scan done on 01/04/2024 showed new consolidative opacities in the lower lobes compared to the 1 on 05/29/2023 which had stable findings of tree in bud and bronchiectasis compared to the CT prior  -at previous follow-up on 01/2024 she had recurrence of symptoms as well, she was treated or Pseudomonas aeruginosa in sputum cultures with a week of ciprofloxacin which yielded some improvement in the symptoms, only residual symptoms at this time is frequent clearing of the throat  -3 AFB cultures of the sputum repeated 02/08/2024, now with MAC growing as well as mixed colonies of other mycobacteria    -unfortunately it appears that we were not able to successfully eradicate her mycobacterium avium complex from the lungs, and she is again symptomatic.  -this has been a protracted course, the patient has had this infection for the past 5-6 years, despite aggressive therapy were not able to eradicated, however the patient was symptomatically doing much better and had noted some significant improvement during the course of treatment  -discussed the difficulty of eradication of the organism especially with a mixed colonies at this time, also discussed the downsides watchful waiting especially given  worsening on imaging and with symptoms since discontinuation of antibiotics   -azithromycin 500 mg p.o. Q 24 hours   -rifabutin will be transitioned back to Rifampin 600mg PO q24h given diarrhea and her being off Apixaban at this time   -Repeat CMP in 1 week after re-starting Rifampin  -Ethambutol 15 milligrams/kilograms p.o. Q 24 hours, however given her BMI of 35, compromised renal function, will use adjusted body weight for calculations will start with 1000 mg Q 24 hours  -Started regimen again 03/28/2024    -given refractory in nature of this infection despite multiple treatment courses and prolonged period of time of the treatments, it is reasonable at this time to consider inhaled Arikayce  -although this medicine can be associated with some nephrotoxicity and ototoxicity, these are minimal and much less significant than any other form of amikacin  -we set up the patient for follow-up with audiology once Arikayce treatments are started and we will continue monitoring labs very closely after initiation started as well  -it is to note that the patient does have mixed colonies in her lungs, 1 of which is very resistant mycobacterium, I do suspect the mycobacterium avium complex to be the 1 causing most of the damage, patient had good stability when we were treating it, only started to progress when treatment was discontinued  -again, today discussed referral to [facility] given the refractory nature of her disease, which patient would like to pursue at this time. -Referral requested  -Given recommendations for pulmonary toileting and Aerobika valve   -Arikayce process started, approval received with very high copay, we are working with company to make it more accessible for Ms. Mckinley      Follow up in about 4 weeks (around 6/26/2024).    60 minutes of total time spent on the encounter, which includes face to face time and non-face to face time preparing to see the patient (eg, review of tests), Obtaining  and/or reviewing separately obtained history, Documenting clinical information in the electronic or other health record, Independently interpreting results (not separately reported) and communicating results to the patient/family/caregiver, or Care coordination (not separately reported).

## 2024-05-30 RX ORDER — ALBUTEROL SULFATE 0.83 MG/ML
2.5 SOLUTION RESPIRATORY (INHALATION) EVERY 6 HOURS PRN
Qty: 112 EACH | Refills: 2 | Status: SHIPPED | OUTPATIENT
Start: 2024-05-30 | End: 2024-08-28

## 2024-06-03 LAB
MYCOBACTERIAL SUSC PNL ISLT SLOWMYCO: NORMAL
MYCOBACTERIAL SUSC PNL ISLT SLOWMYCO: NORMAL

## 2024-06-04 ENCOUNTER — LAB VISIT (OUTPATIENT)
Dept: LAB | Facility: HOSPITAL | Age: 69
End: 2024-06-04
Attending: GENERAL PRACTICE
Payer: MEDICARE

## 2024-06-04 ENCOUNTER — TELEPHONE (OUTPATIENT)
Dept: INFECTIOUS DISEASES | Facility: CLINIC | Age: 69
End: 2024-06-04
Payer: MEDICARE

## 2024-06-04 DIAGNOSIS — A31.0 MYCOBACTERIUM AVIUM COMPLEX: Primary | ICD-10-CM

## 2024-06-04 DIAGNOSIS — A31.0 MYCOBACTERIUM AVIUM COMPLEX: ICD-10-CM

## 2024-06-04 LAB
ALBUMIN SERPL-MCNC: 3.8 G/DL (ref 3.4–4.8)
ALBUMIN/GLOB SERPL: 1.2 RATIO (ref 1.1–2)
ALP SERPL-CCNC: 31 UNIT/L (ref 40–150)
ALT SERPL-CCNC: 18 UNIT/L (ref 0–55)
ANION GAP SERPL CALC-SCNC: 10 MEQ/L
AST SERPL-CCNC: 19 UNIT/L (ref 5–34)
BASOPHILS # BLD AUTO: 0.03 X10(3)/MCL
BASOPHILS NFR BLD AUTO: 0.6 %
BILIRUB SERPL-MCNC: 0.6 MG/DL
BUN SERPL-MCNC: 18.4 MG/DL (ref 9.8–20.1)
CALCIUM SERPL-MCNC: 9.6 MG/DL (ref 8.4–10.2)
CHLORIDE SERPL-SCNC: 101 MMOL/L (ref 98–107)
CO2 SERPL-SCNC: 26 MMOL/L (ref 23–31)
CREAT SERPL-MCNC: 1.24 MG/DL (ref 0.55–1.02)
CREAT/UREA NIT SERPL: 15
EOSINOPHIL # BLD AUTO: 0.26 X10(3)/MCL (ref 0–0.9)
EOSINOPHIL NFR BLD AUTO: 5.1 %
ERYTHROCYTE [DISTWIDTH] IN BLOOD BY AUTOMATED COUNT: 13.4 % (ref 11.5–17)
GFR SERPLBLD CREATININE-BSD FMLA CKD-EPI: 47 ML/MIN/1.73/M2
GLOBULIN SER-MCNC: 3.2 GM/DL (ref 2.4–3.5)
GLUCOSE SERPL-MCNC: 136 MG/DL (ref 82–115)
HCT VFR BLD AUTO: 35.4 % (ref 37–47)
HGB BLD-MCNC: 11.4 G/DL (ref 12–16)
IMM GRANULOCYTES # BLD AUTO: 0.01 X10(3)/MCL (ref 0–0.04)
IMM GRANULOCYTES NFR BLD AUTO: 0.2 %
LYMPHOCYTES # BLD AUTO: 0.81 X10(3)/MCL (ref 0.6–4.6)
LYMPHOCYTES NFR BLD AUTO: 15.8 %
MCH RBC QN AUTO: 29.6 PG (ref 27–31)
MCHC RBC AUTO-ENTMCNC: 32.2 G/DL (ref 33–36)
MCV RBC AUTO: 91.9 FL (ref 80–94)
MONOCYTES # BLD AUTO: 0.55 X10(3)/MCL (ref 0.1–1.3)
MONOCYTES NFR BLD AUTO: 10.7 %
NEUTROPHILS # BLD AUTO: 3.47 X10(3)/MCL (ref 2.1–9.2)
NEUTROPHILS NFR BLD AUTO: 67.6 %
NRBC BLD AUTO-RTO: 0 %
PLATELET # BLD AUTO: 246 X10(3)/MCL (ref 130–400)
PMV BLD AUTO: 10.7 FL (ref 7.4–10.4)
POTASSIUM SERPL-SCNC: 4 MMOL/L (ref 3.5–5.1)
PROT SERPL-MCNC: 7 GM/DL (ref 5.8–7.6)
RBC # BLD AUTO: 3.85 X10(6)/MCL (ref 4.2–5.4)
SODIUM SERPL-SCNC: 137 MMOL/L (ref 136–145)
WBC # SPEC AUTO: 5.13 X10(3)/MCL (ref 4.5–11.5)

## 2024-06-04 PROCEDURE — 36415 COLL VENOUS BLD VENIPUNCTURE: CPT

## 2024-06-04 PROCEDURE — 85025 COMPLETE CBC W/AUTO DIFF WBC: CPT

## 2024-06-04 PROCEDURE — 80053 COMPREHEN METABOLIC PANEL: CPT

## 2024-06-04 RX ORDER — NEBULIZER AND COMPRESSOR
EACH MISCELLANEOUS
COMMUNITY
End: 2024-06-04 | Stop reason: SDUPTHER

## 2024-06-04 RX ORDER — NEBULIZER AND COMPRESSOR
EACH MISCELLANEOUS
Qty: 1 EACH | Refills: 0 | Status: SHIPPED | OUTPATIENT
Start: 2024-06-04

## 2024-06-10 ENCOUNTER — TELEPHONE (OUTPATIENT)
Dept: INFECTIOUS DISEASES | Facility: CLINIC | Age: 69
End: 2024-06-10
Payer: MEDICARE

## 2024-06-10 NOTE — TELEPHONE ENCOUNTER
Spoke with pt.. she said that she has not experienced any side effects with taking Arikayce.. pt states she is doing well.. I told her please let us know if anything changes.. slsl

## 2024-06-12 ENCOUNTER — LAB VISIT (OUTPATIENT)
Dept: LAB | Facility: HOSPITAL | Age: 69
End: 2024-06-12
Attending: GENERAL PRACTICE
Payer: MEDICARE

## 2024-06-12 DIAGNOSIS — A31.0 PULMONARY MYCOBACTERIUM AVIUM COMPLEX (MAC) INFECTION: ICD-10-CM

## 2024-06-12 LAB
ALBUMIN SERPL-MCNC: 3.7 G/DL (ref 3.4–4.8)
ALBUMIN/GLOB SERPL: 1.2 RATIO (ref 1.1–2)
ALP SERPL-CCNC: 29 UNIT/L (ref 40–150)
ALT SERPL-CCNC: 12 UNIT/L (ref 0–55)
ANION GAP SERPL CALC-SCNC: 7 MEQ/L
AST SERPL-CCNC: 15 UNIT/L (ref 5–34)
BASOPHILS # BLD AUTO: 0.03 X10(3)/MCL
BASOPHILS NFR BLD AUTO: 0.4 %
BILIRUB SERPL-MCNC: 0.7 MG/DL
BUN SERPL-MCNC: 18.8 MG/DL (ref 9.8–20.1)
CALCIUM SERPL-MCNC: 9.5 MG/DL (ref 8.4–10.2)
CHLORIDE SERPL-SCNC: 99 MMOL/L (ref 98–107)
CO2 SERPL-SCNC: 27 MMOL/L (ref 23–31)
CREAT SERPL-MCNC: 1.18 MG/DL (ref 0.55–1.02)
CREAT/UREA NIT SERPL: 16
EOSINOPHIL # BLD AUTO: 0.21 X10(3)/MCL (ref 0–0.9)
EOSINOPHIL NFR BLD AUTO: 3.1 %
ERYTHROCYTE [DISTWIDTH] IN BLOOD BY AUTOMATED COUNT: 13.2 % (ref 11.5–17)
GFR SERPLBLD CREATININE-BSD FMLA CKD-EPI: 49 ML/MIN/1.73/M2
GLOBULIN SER-MCNC: 3.2 GM/DL (ref 2.4–3.5)
GLUCOSE SERPL-MCNC: 137 MG/DL (ref 82–115)
HCT VFR BLD AUTO: 32.9 % (ref 37–47)
HGB BLD-MCNC: 11 G/DL (ref 12–16)
IMM GRANULOCYTES # BLD AUTO: 0.02 X10(3)/MCL (ref 0–0.04)
IMM GRANULOCYTES NFR BLD AUTO: 0.3 %
LYMPHOCYTES # BLD AUTO: 0.74 X10(3)/MCL (ref 0.6–4.6)
LYMPHOCYTES NFR BLD AUTO: 11 %
MCH RBC QN AUTO: 30.5 PG (ref 27–31)
MCHC RBC AUTO-ENTMCNC: 33.4 G/DL (ref 33–36)
MCV RBC AUTO: 91.1 FL (ref 80–94)
MONOCYTES # BLD AUTO: 0.64 X10(3)/MCL (ref 0.1–1.3)
MONOCYTES NFR BLD AUTO: 9.5 %
NEUTROPHILS # BLD AUTO: 5.09 X10(3)/MCL (ref 2.1–9.2)
NEUTROPHILS NFR BLD AUTO: 75.7 %
NRBC BLD AUTO-RTO: 0 %
PLATELET # BLD AUTO: 255 X10(3)/MCL (ref 130–400)
PMV BLD AUTO: 10.5 FL (ref 7.4–10.4)
POTASSIUM SERPL-SCNC: 4 MMOL/L (ref 3.5–5.1)
PROT SERPL-MCNC: 6.9 GM/DL (ref 5.8–7.6)
RBC # BLD AUTO: 3.61 X10(6)/MCL (ref 4.2–5.4)
SODIUM SERPL-SCNC: 133 MMOL/L (ref 136–145)
WBC # SPEC AUTO: 6.73 X10(3)/MCL (ref 4.5–11.5)

## 2024-06-12 PROCEDURE — 80053 COMPREHEN METABOLIC PANEL: CPT

## 2024-06-12 PROCEDURE — 85025 COMPLETE CBC W/AUTO DIFF WBC: CPT

## 2024-06-12 PROCEDURE — 36415 COLL VENOUS BLD VENIPUNCTURE: CPT

## 2024-06-26 ENCOUNTER — OFFICE VISIT (OUTPATIENT)
Dept: INFECTIOUS DISEASES | Facility: CLINIC | Age: 69
End: 2024-06-26
Payer: MEDICARE

## 2024-06-26 VITALS
HEART RATE: 98 BPM | DIASTOLIC BLOOD PRESSURE: 71 MMHG | BODY MASS INDEX: 33.66 KG/M2 | TEMPERATURE: 99 F | RESPIRATION RATE: 18 BRPM | WEIGHT: 202 LBS | OXYGEN SATURATION: 97 % | SYSTOLIC BLOOD PRESSURE: 147 MMHG | HEIGHT: 65 IN

## 2024-06-26 DIAGNOSIS — H91.90 HEARING LOSS, UNSPECIFIED HEARING LOSS TYPE, UNSPECIFIED LATERALITY: ICD-10-CM

## 2024-06-26 DIAGNOSIS — R05.3 CHRONIC COUGH: ICD-10-CM

## 2024-06-26 DIAGNOSIS — A31.0 MAI (MYCOBACTERIUM AVIUM-INTRACELLULARE): ICD-10-CM

## 2024-06-26 DIAGNOSIS — A31.0 MYCOBACTERIUM AVIUM COMPLEX: Primary | ICD-10-CM

## 2024-06-26 DIAGNOSIS — A31.0 PULMONARY MYCOBACTERIUM AVIUM COMPLEX (MAC) INFECTION: ICD-10-CM

## 2024-06-26 DIAGNOSIS — N18.9 CHRONIC KIDNEY DISEASE, UNSPECIFIED CKD STAGE: ICD-10-CM

## 2024-06-26 DIAGNOSIS — J47.9 BRONCHIECTASIS WITHOUT COMPLICATION: ICD-10-CM

## 2024-06-26 DIAGNOSIS — A31.0 MYCOBACTERIUM AVIUM COMPLEX: ICD-10-CM

## 2024-06-26 PROCEDURE — 99999 PR PBB SHADOW E&M-EST. PATIENT-LVL III: CPT | Mod: PBBFAC,,, | Performed by: GENERAL PRACTICE

## 2024-06-26 PROCEDURE — 99215 OFFICE O/P EST HI 40 MIN: CPT | Mod: S$PBB,,, | Performed by: GENERAL PRACTICE

## 2024-06-26 PROCEDURE — 99213 OFFICE O/P EST LOW 20 MIN: CPT | Mod: PBBFAC | Performed by: GENERAL PRACTICE

## 2024-06-26 RX ORDER — COMPRESSOR, FOR NEBULIZER
EACH MISCELLANEOUS
COMMUNITY
Start: 2024-06-04

## 2024-06-26 RX ORDER — ETHAMBUTOL HYDROCHLORIDE 400 MG/1
1200 TABLET, FILM COATED ORAL DAILY
Qty: 90 TABLET | Refills: 2 | Status: SHIPPED | OUTPATIENT
Start: 2024-06-26 | End: 2024-12-23

## 2024-06-26 RX ORDER — AZITHROMYCIN 500 MG/1
500 TABLET, FILM COATED ORAL DAILY
Qty: 30 TABLET | Refills: 2 | Status: SHIPPED | OUTPATIENT
Start: 2024-06-26

## 2024-06-26 RX ORDER — BUSPIRONE HYDROCHLORIDE 5 MG/1
5 TABLET ORAL 2 TIMES DAILY PRN
COMMUNITY
Start: 2024-06-11

## 2024-06-27 ENCOUNTER — LAB VISIT (OUTPATIENT)
Dept: LAB | Facility: HOSPITAL | Age: 69
End: 2024-06-27
Attending: GENERAL PRACTICE
Payer: MEDICARE

## 2024-06-27 DIAGNOSIS — A31.0 MYCOBACTERIUM AVIUM COMPLEX: ICD-10-CM

## 2024-06-27 LAB
ALBUMIN SERPL-MCNC: 3.9 G/DL (ref 3.4–4.8)
ALBUMIN/GLOB SERPL: 1.1 RATIO (ref 1.1–2)
ALP SERPL-CCNC: 29 UNIT/L (ref 40–150)
ALT SERPL-CCNC: 14 UNIT/L (ref 0–55)
ANION GAP SERPL CALC-SCNC: 11 MEQ/L
AST SERPL-CCNC: 20 UNIT/L (ref 5–34)
BASOPHILS # BLD AUTO: 0.04 X10(3)/MCL
BASOPHILS NFR BLD AUTO: 0.6 %
BILIRUB SERPL-MCNC: 0.7 MG/DL
BUN SERPL-MCNC: 15.8 MG/DL (ref 9.8–20.1)
CALCIUM SERPL-MCNC: 9.4 MG/DL (ref 8.4–10.2)
CHLORIDE SERPL-SCNC: 100 MMOL/L (ref 98–107)
CO2 SERPL-SCNC: 24 MMOL/L (ref 23–31)
CREAT SERPL-MCNC: 1.09 MG/DL (ref 0.55–1.02)
CREAT/UREA NIT SERPL: 14
EOSINOPHIL # BLD AUTO: 0.21 X10(3)/MCL (ref 0–0.9)
EOSINOPHIL NFR BLD AUTO: 2.9 %
ERYTHROCYTE [DISTWIDTH] IN BLOOD BY AUTOMATED COUNT: 13.4 % (ref 11.5–17)
GFR SERPLBLD CREATININE-BSD FMLA CKD-EPI: 54 ML/MIN/1.73/M2
GLOBULIN SER-MCNC: 3.4 GM/DL (ref 2.4–3.5)
GLUCOSE SERPL-MCNC: 96 MG/DL (ref 82–115)
HCT VFR BLD AUTO: 34.2 % (ref 37–47)
HGB BLD-MCNC: 11.3 G/DL (ref 12–16)
IMM GRANULOCYTES # BLD AUTO: 0.03 X10(3)/MCL (ref 0–0.04)
IMM GRANULOCYTES NFR BLD AUTO: 0.4 %
LYMPHOCYTES # BLD AUTO: 0.96 X10(3)/MCL (ref 0.6–4.6)
LYMPHOCYTES NFR BLD AUTO: 13.5 %
MCH RBC QN AUTO: 30.2 PG (ref 27–31)
MCHC RBC AUTO-ENTMCNC: 33 G/DL (ref 33–36)
MCV RBC AUTO: 91.4 FL (ref 80–94)
MONOCYTES # BLD AUTO: 0.73 X10(3)/MCL (ref 0.1–1.3)
MONOCYTES NFR BLD AUTO: 10.3 %
NEUTROPHILS # BLD AUTO: 5.15 X10(3)/MCL (ref 2.1–9.2)
NEUTROPHILS NFR BLD AUTO: 72.3 %
NRBC BLD AUTO-RTO: 0 %
PLATELET # BLD AUTO: 268 X10(3)/MCL (ref 130–400)
PMV BLD AUTO: 9.9 FL (ref 7.4–10.4)
POTASSIUM SERPL-SCNC: 4.1 MMOL/L (ref 3.5–5.1)
PROT SERPL-MCNC: 7.3 GM/DL (ref 5.8–7.6)
RBC # BLD AUTO: 3.74 X10(6)/MCL (ref 4.2–5.4)
SODIUM SERPL-SCNC: 135 MMOL/L (ref 136–145)
WBC # BLD AUTO: 7.12 X10(3)/MCL (ref 4.5–11.5)

## 2024-06-27 PROCEDURE — 85025 COMPLETE CBC W/AUTO DIFF WBC: CPT

## 2024-06-27 PROCEDURE — 80053 COMPREHEN METABOLIC PANEL: CPT

## 2024-06-27 PROCEDURE — 36415 COLL VENOUS BLD VENIPUNCTURE: CPT

## 2024-07-09 ENCOUNTER — HOSPITAL ENCOUNTER (OUTPATIENT)
Dept: RADIOLOGY | Facility: HOSPITAL | Age: 69
Discharge: HOME | End: 2024-07-09
Attending: GENERAL PRACTICE
Payer: MEDICARE

## 2024-07-09 DIAGNOSIS — A31.0 MYCOBACTERIUM AVIUM COMPLEX: ICD-10-CM

## 2024-07-09 PROCEDURE — 71250 CT THORAX DX C-: CPT | Mod: TC

## 2024-07-10 ENCOUNTER — LAB VISIT (OUTPATIENT)
Dept: LAB | Facility: HOSPITAL | Age: 69
End: 2024-07-10
Attending: GENERAL PRACTICE
Payer: MEDICARE

## 2024-07-10 DIAGNOSIS — A31.0 MYCOBACTERIUM AVIUM COMPLEX: ICD-10-CM

## 2024-07-10 LAB
ALBUMIN SERPL-MCNC: 3.8 G/DL (ref 3.4–4.8)
ALBUMIN/GLOB SERPL: 1.1 RATIO (ref 1.1–2)
ALP SERPL-CCNC: 29 UNIT/L (ref 40–150)
ALT SERPL-CCNC: 11 UNIT/L (ref 0–55)
ANION GAP SERPL CALC-SCNC: 9 MEQ/L
AST SERPL-CCNC: 19 UNIT/L (ref 5–34)
BASOPHILS # BLD AUTO: 0.04 X10(3)/MCL
BASOPHILS NFR BLD AUTO: 0.7 %
BILIRUB SERPL-MCNC: 0.5 MG/DL
BUN SERPL-MCNC: 19.1 MG/DL (ref 9.8–20.1)
CALCIUM SERPL-MCNC: 9.6 MG/DL (ref 8.4–10.2)
CHLORIDE SERPL-SCNC: 101 MMOL/L (ref 98–107)
CO2 SERPL-SCNC: 22 MMOL/L (ref 23–31)
CREAT SERPL-MCNC: 1.09 MG/DL (ref 0.55–1.02)
CREAT/UREA NIT SERPL: 18
EOSINOPHIL # BLD AUTO: 0.28 X10(3)/MCL (ref 0–0.9)
EOSINOPHIL NFR BLD AUTO: 4.9 %
ERYTHROCYTE [DISTWIDTH] IN BLOOD BY AUTOMATED COUNT: 13.6 % (ref 11.5–17)
GFR SERPLBLD CREATININE-BSD FMLA CKD-EPI: 54 ML/MIN/1.73/M2
GLOBULIN SER-MCNC: 3.5 GM/DL (ref 2.4–3.5)
GLUCOSE SERPL-MCNC: 97 MG/DL (ref 82–115)
HCT VFR BLD AUTO: 34.1 % (ref 37–47)
HGB BLD-MCNC: 11.2 G/DL (ref 12–16)
IMM GRANULOCYTES # BLD AUTO: 0.03 X10(3)/MCL (ref 0–0.04)
IMM GRANULOCYTES NFR BLD AUTO: 0.5 %
LYMPHOCYTES # BLD AUTO: 0.98 X10(3)/MCL (ref 0.6–4.6)
LYMPHOCYTES NFR BLD AUTO: 17.3 %
MCH RBC QN AUTO: 30.4 PG (ref 27–31)
MCHC RBC AUTO-ENTMCNC: 32.8 G/DL (ref 33–36)
MCV RBC AUTO: 92.4 FL (ref 80–94)
MONOCYTES # BLD AUTO: 0.6 X10(3)/MCL (ref 0.1–1.3)
MONOCYTES NFR BLD AUTO: 10.6 %
NEUTROPHILS # BLD AUTO: 3.74 X10(3)/MCL (ref 2.1–9.2)
NEUTROPHILS NFR BLD AUTO: 66 %
NRBC BLD AUTO-RTO: 0 %
PLATELET # BLD AUTO: 271 X10(3)/MCL (ref 130–400)
PMV BLD AUTO: 9.8 FL (ref 7.4–10.4)
POTASSIUM SERPL-SCNC: 4.1 MMOL/L (ref 3.5–5.1)
PROT SERPL-MCNC: 7.3 GM/DL (ref 5.8–7.6)
RBC # BLD AUTO: 3.69 X10(6)/MCL (ref 4.2–5.4)
SODIUM SERPL-SCNC: 132 MMOL/L (ref 136–145)
WBC # BLD AUTO: 5.67 X10(3)/MCL (ref 4.5–11.5)

## 2024-07-10 PROCEDURE — 36415 COLL VENOUS BLD VENIPUNCTURE: CPT

## 2024-07-10 PROCEDURE — 85025 COMPLETE CBC W/AUTO DIFF WBC: CPT

## 2024-07-10 PROCEDURE — 80053 COMPREHEN METABOLIC PANEL: CPT

## 2024-07-18 DIAGNOSIS — A31.0 MYCOBACTERIUM AVIUM COMPLEX: ICD-10-CM

## 2024-07-18 RX ORDER — AMIKACIN 590 MG/8.4ML
1 SUSPENSION RESPIRATORY (INHALATION) DAILY
Qty: 30 EACH | Refills: 0 | Status: SHIPPED | OUTPATIENT
Start: 2024-07-18

## 2024-07-24 ENCOUNTER — LAB VISIT (OUTPATIENT)
Dept: LAB | Facility: HOSPITAL | Age: 69
End: 2024-07-24
Attending: GENERAL PRACTICE
Payer: MEDICARE

## 2024-07-24 DIAGNOSIS — A31.0 MYCOBACTERIUM AVIUM COMPLEX: ICD-10-CM

## 2024-07-24 LAB
ALBUMIN SERPL-MCNC: 4.1 G/DL (ref 3.4–4.8)
ALBUMIN/GLOB SERPL: 1.3 RATIO (ref 1.1–2)
ALP SERPL-CCNC: 28 UNIT/L (ref 40–150)
ALT SERPL-CCNC: 12 UNIT/L (ref 0–55)
ANION GAP SERPL CALC-SCNC: 6 MEQ/L
AST SERPL-CCNC: 18 UNIT/L (ref 5–34)
BASOPHILS # BLD AUTO: 0.03 X10(3)/MCL
BASOPHILS NFR BLD AUTO: 0.5 %
BILIRUB SERPL-MCNC: 0.6 MG/DL
BUN SERPL-MCNC: 17.1 MG/DL (ref 9.8–20.1)
CALCIUM SERPL-MCNC: 9.8 MG/DL (ref 8.4–10.2)
CHLORIDE SERPL-SCNC: 101 MMOL/L (ref 98–107)
CO2 SERPL-SCNC: 27 MMOL/L (ref 23–31)
CREAT SERPL-MCNC: 1.07 MG/DL (ref 0.55–1.02)
CREAT/UREA NIT SERPL: 16
EOSINOPHIL # BLD AUTO: 0.25 X10(3)/MCL (ref 0–0.9)
EOSINOPHIL NFR BLD AUTO: 3.8 %
ERYTHROCYTE [DISTWIDTH] IN BLOOD BY AUTOMATED COUNT: 13.8 % (ref 11.5–17)
GFR SERPLBLD CREATININE-BSD FMLA CKD-EPI: 56 ML/MIN/1.73/M2
GLOBULIN SER-MCNC: 3.2 GM/DL (ref 2.4–3.5)
GLUCOSE SERPL-MCNC: 80 MG/DL (ref 82–115)
HCT VFR BLD AUTO: 35.5 % (ref 37–47)
HGB BLD-MCNC: 11.6 G/DL (ref 12–16)
IMM GRANULOCYTES # BLD AUTO: 0.02 X10(3)/MCL (ref 0–0.04)
IMM GRANULOCYTES NFR BLD AUTO: 0.3 %
LYMPHOCYTES # BLD AUTO: 0.95 X10(3)/MCL (ref 0.6–4.6)
LYMPHOCYTES NFR BLD AUTO: 14.4 %
MCH RBC QN AUTO: 30.5 PG (ref 27–31)
MCHC RBC AUTO-ENTMCNC: 32.7 G/DL (ref 33–36)
MCV RBC AUTO: 93.4 FL (ref 80–94)
MONOCYTES # BLD AUTO: 0.57 X10(3)/MCL (ref 0.1–1.3)
MONOCYTES NFR BLD AUTO: 8.6 %
NEUTROPHILS # BLD AUTO: 4.8 X10(3)/MCL (ref 2.1–9.2)
NEUTROPHILS NFR BLD AUTO: 72.4 %
NRBC BLD AUTO-RTO: 0 %
PLATELET # BLD AUTO: 251 X10(3)/MCL (ref 130–400)
PMV BLD AUTO: 9.8 FL (ref 7.4–10.4)
POTASSIUM SERPL-SCNC: 4.4 MMOL/L (ref 3.5–5.1)
PROT SERPL-MCNC: 7.3 GM/DL (ref 5.8–7.6)
RBC # BLD AUTO: 3.8 X10(6)/MCL (ref 4.2–5.4)
SODIUM SERPL-SCNC: 134 MMOL/L (ref 136–145)
WBC # BLD AUTO: 6.62 X10(3)/MCL (ref 4.5–11.5)

## 2024-07-24 PROCEDURE — 85025 COMPLETE CBC W/AUTO DIFF WBC: CPT

## 2024-07-24 PROCEDURE — 80053 COMPREHEN METABOLIC PANEL: CPT

## 2024-07-24 PROCEDURE — 36415 COLL VENOUS BLD VENIPUNCTURE: CPT

## 2024-08-07 ENCOUNTER — LAB VISIT (OUTPATIENT)
Dept: LAB | Facility: HOSPITAL | Age: 69
End: 2024-08-07
Attending: GENERAL PRACTICE
Payer: MEDICARE

## 2024-08-07 DIAGNOSIS — A31.0 MYCOBACTERIUM AVIUM COMPLEX: ICD-10-CM

## 2024-08-07 LAB
ALBUMIN SERPL-MCNC: 3.8 G/DL (ref 3.4–4.8)
ALBUMIN/GLOB SERPL: 1.3 RATIO (ref 1.1–2)
ALP SERPL-CCNC: 23 UNIT/L (ref 40–150)
ALT SERPL-CCNC: 13 UNIT/L (ref 0–55)
ANION GAP SERPL CALC-SCNC: 10 MEQ/L
AST SERPL-CCNC: 19 UNIT/L (ref 5–34)
BASOPHILS # BLD AUTO: 0.01 X10(3)/MCL
BASOPHILS NFR BLD AUTO: 0.3 %
BILIRUB SERPL-MCNC: 0.3 MG/DL
BUN SERPL-MCNC: 20.1 MG/DL (ref 9.8–20.1)
CALCIUM SERPL-MCNC: 8.8 MG/DL (ref 8.4–10.2)
CHLORIDE SERPL-SCNC: 100 MMOL/L (ref 98–107)
CO2 SERPL-SCNC: 21 MMOL/L (ref 23–31)
CREAT SERPL-MCNC: 1.31 MG/DL (ref 0.55–1.02)
CREAT/UREA NIT SERPL: 15
EOSINOPHIL # BLD AUTO: 0.1 X10(3)/MCL (ref 0–0.9)
EOSINOPHIL NFR BLD AUTO: 3.4 %
ERYTHROCYTE [DISTWIDTH] IN BLOOD BY AUTOMATED COUNT: 13.6 % (ref 11.5–17)
GFR SERPLBLD CREATININE-BSD FMLA CKD-EPI: 44 ML/MIN/1.73/M2
GLOBULIN SER-MCNC: 2.9 GM/DL (ref 2.4–3.5)
GLUCOSE SERPL-MCNC: 127 MG/DL (ref 82–115)
HCT VFR BLD AUTO: 33.1 % (ref 37–47)
HGB BLD-MCNC: 10.9 G/DL (ref 12–16)
IMM GRANULOCYTES # BLD AUTO: 0.01 X10(3)/MCL (ref 0–0.04)
IMM GRANULOCYTES NFR BLD AUTO: 0.3 %
LYMPHOCYTES # BLD AUTO: 0.75 X10(3)/MCL (ref 0.6–4.6)
LYMPHOCYTES NFR BLD AUTO: 25.9 %
MCH RBC QN AUTO: 30.4 PG (ref 27–31)
MCHC RBC AUTO-ENTMCNC: 32.9 G/DL (ref 33–36)
MCV RBC AUTO: 92.5 FL (ref 80–94)
MONOCYTES # BLD AUTO: 0.36 X10(3)/MCL (ref 0.1–1.3)
MONOCYTES NFR BLD AUTO: 12.4 %
NEUTROPHILS # BLD AUTO: 1.67 X10(3)/MCL (ref 2.1–9.2)
NEUTROPHILS NFR BLD AUTO: 57.7 %
NRBC BLD AUTO-RTO: 0 %
PLATELET # BLD AUTO: 208 X10(3)/MCL (ref 130–400)
PLATELETS.RETICULATED NFR BLD AUTO: 3.4 % (ref 0.9–11.2)
PMV BLD AUTO: 10.7 FL (ref 7.4–10.4)
POTASSIUM SERPL-SCNC: 4.4 MMOL/L (ref 3.5–5.1)
PROT SERPL-MCNC: 6.7 GM/DL (ref 5.8–7.6)
RBC # BLD AUTO: 3.58 X10(6)/MCL (ref 4.2–5.4)
SODIUM SERPL-SCNC: 131 MMOL/L (ref 136–145)
WBC # BLD AUTO: 2.9 X10(3)/MCL (ref 4.5–11.5)

## 2024-08-07 PROCEDURE — 80053 COMPREHEN METABOLIC PANEL: CPT

## 2024-08-07 PROCEDURE — 85025 COMPLETE CBC W/AUTO DIFF WBC: CPT

## 2024-08-07 PROCEDURE — 36415 COLL VENOUS BLD VENIPUNCTURE: CPT

## 2024-08-09 DIAGNOSIS — A31.0 MYCOBACTERIUM AVIUM COMPLEX: ICD-10-CM

## 2024-08-09 RX ORDER — AMIKACIN 590 MG/8.4ML
1 SUSPENSION RESPIRATORY (INHALATION) DAILY
Qty: 30 EACH | Refills: 0 | Status: SHIPPED | OUTPATIENT
Start: 2024-08-09

## 2024-08-20 ENCOUNTER — LAB VISIT (OUTPATIENT)
Dept: LAB | Facility: HOSPITAL | Age: 69
End: 2024-08-20
Attending: GENERAL PRACTICE
Payer: MEDICARE

## 2024-08-20 DIAGNOSIS — A31.0 MYCOBACTERIUM AVIUM COMPLEX: ICD-10-CM

## 2024-08-20 DIAGNOSIS — N17.9 AKI (ACUTE KIDNEY INJURY): ICD-10-CM

## 2024-08-20 LAB
ALBUMIN SERPL-MCNC: 3.9 G/DL (ref 3.4–4.8)
ALBUMIN/GLOB SERPL: 1.3 RATIO (ref 1.1–2)
ALP SERPL-CCNC: 25 UNIT/L (ref 40–150)
ALT SERPL-CCNC: 13 UNIT/L (ref 0–55)
ANION GAP SERPL CALC-SCNC: 9 MEQ/L
AST SERPL-CCNC: 17 UNIT/L (ref 5–34)
BACTERIA #/AREA URNS AUTO: ABNORMAL /HPF
BASOPHILS # BLD AUTO: 0.04 X10(3)/MCL
BASOPHILS NFR BLD AUTO: 0.7 %
BILIRUB SERPL-MCNC: 0.5 MG/DL
BILIRUB UR QL STRIP.AUTO: NEGATIVE
BUN SERPL-MCNC: 14.8 MG/DL (ref 9.8–20.1)
CALCIUM SERPL-MCNC: 9.5 MG/DL (ref 8.4–10.2)
CHLORIDE SERPL-SCNC: 101 MMOL/L (ref 98–107)
CLARITY UR: CLEAR
CO2 SERPL-SCNC: 22 MMOL/L (ref 23–31)
COLOR UR AUTO: ABNORMAL
CREAT SERPL-MCNC: 1.12 MG/DL (ref 0.55–1.02)
CREAT UR-MCNC: 16 MG/DL (ref 45–106)
CREAT/UREA NIT SERPL: 13
EOSINOPHIL # BLD AUTO: 0.23 X10(3)/MCL (ref 0–0.9)
EOSINOPHIL NFR BLD AUTO: 4.1 %
ERYTHROCYTE [DISTWIDTH] IN BLOOD BY AUTOMATED COUNT: 13.6 % (ref 11.5–17)
GFR SERPLBLD CREATININE-BSD FMLA CKD-EPI: 53 ML/MIN/1.73/M2
GLOBULIN SER-MCNC: 2.9 GM/DL (ref 2.4–3.5)
GLUCOSE SERPL-MCNC: 79 MG/DL (ref 82–115)
GLUCOSE UR QL STRIP: NORMAL
HCT VFR BLD AUTO: 33.4 % (ref 37–47)
HGB BLD-MCNC: 10.8 G/DL (ref 12–16)
HGB UR QL STRIP: NEGATIVE
IMM GRANULOCYTES # BLD AUTO: 0.01 X10(3)/MCL (ref 0–0.04)
IMM GRANULOCYTES NFR BLD AUTO: 0.2 %
KETONES UR QL STRIP: NEGATIVE
LEUKOCYTE ESTERASE UR QL STRIP: NEGATIVE
LYMPHOCYTES # BLD AUTO: 0.99 X10(3)/MCL (ref 0.6–4.6)
LYMPHOCYTES NFR BLD AUTO: 17.5 %
MCH RBC QN AUTO: 29.8 PG (ref 27–31)
MCHC RBC AUTO-ENTMCNC: 32.3 G/DL (ref 33–36)
MCV RBC AUTO: 92.3 FL (ref 80–94)
MONOCYTES # BLD AUTO: 0.69 X10(3)/MCL (ref 0.1–1.3)
MONOCYTES NFR BLD AUTO: 12.2 %
NEUTROPHILS # BLD AUTO: 3.7 X10(3)/MCL (ref 2.1–9.2)
NEUTROPHILS NFR BLD AUTO: 65.3 %
NITRITE UR QL STRIP: NEGATIVE
NRBC BLD AUTO-RTO: 0 %
PH UR STRIP: 5 [PH]
PLATELET # BLD AUTO: 287 X10(3)/MCL (ref 130–400)
PMV BLD AUTO: 10.5 FL (ref 7.4–10.4)
POTASSIUM SERPL-SCNC: 4.1 MMOL/L (ref 3.5–5.1)
PROT SERPL-MCNC: 6.8 GM/DL (ref 5.8–7.6)
PROT UR QL STRIP: NEGATIVE
PROT UR STRIP-MCNC: <6.8 MG/DL
PTH-INTACT SERPL-MCNC: 45.8 PG/ML (ref 8.7–77)
RBC # BLD AUTO: 3.62 X10(6)/MCL (ref 4.2–5.4)
RBC #/AREA URNS AUTO: ABNORMAL /HPF
SODIUM SERPL-SCNC: 132 MMOL/L (ref 136–145)
SP GR UR STRIP.AUTO: 1 (ref 1–1.03)
SQUAMOUS #/AREA URNS LPF: ABNORMAL /HPF
UROBILINOGEN UR STRIP-ACNC: NORMAL
WBC # BLD AUTO: 5.66 X10(3)/MCL (ref 4.5–11.5)
WBC #/AREA URNS AUTO: ABNORMAL /HPF

## 2024-08-20 PROCEDURE — 85025 COMPLETE CBC W/AUTO DIFF WBC: CPT

## 2024-08-20 PROCEDURE — 81015 MICROSCOPIC EXAM OF URINE: CPT

## 2024-08-20 PROCEDURE — 83970 ASSAY OF PARATHORMONE: CPT

## 2024-08-20 PROCEDURE — 81001 URINALYSIS AUTO W/SCOPE: CPT

## 2024-08-20 PROCEDURE — 80053 COMPREHEN METABOLIC PANEL: CPT

## 2024-08-20 PROCEDURE — 82570 ASSAY OF URINE CREATININE: CPT

## 2024-08-20 PROCEDURE — 36415 COLL VENOUS BLD VENIPUNCTURE: CPT

## 2024-08-20 PROCEDURE — 84156 ASSAY OF PROTEIN URINE: CPT

## 2024-08-21 ENCOUNTER — OFFICE VISIT (OUTPATIENT)
Dept: INFECTIOUS DISEASES | Facility: CLINIC | Age: 69
End: 2024-08-21
Payer: MEDICARE

## 2024-08-21 VITALS
SYSTOLIC BLOOD PRESSURE: 136 MMHG | BODY MASS INDEX: 32.82 KG/M2 | HEART RATE: 74 BPM | DIASTOLIC BLOOD PRESSURE: 76 MMHG | RESPIRATION RATE: 18 BRPM | HEIGHT: 65 IN | OXYGEN SATURATION: 98 % | WEIGHT: 197 LBS | TEMPERATURE: 98 F

## 2024-08-21 DIAGNOSIS — R19.7 DIARRHEA, UNSPECIFIED TYPE: ICD-10-CM

## 2024-08-21 DIAGNOSIS — A31.0 PULMONARY MYCOBACTERIUM AVIUM COMPLEX (MAC) INFECTION: ICD-10-CM

## 2024-08-21 DIAGNOSIS — A31.0 MYCOBACTERIUM AVIUM COMPLEX: ICD-10-CM

## 2024-08-21 DIAGNOSIS — H91.90 HEARING LOSS, UNSPECIFIED HEARING LOSS TYPE, UNSPECIFIED LATERALITY: ICD-10-CM

## 2024-08-21 DIAGNOSIS — N18.9 CHRONIC KIDNEY DISEASE, UNSPECIFIED CKD STAGE: Primary | ICD-10-CM

## 2024-08-21 DIAGNOSIS — A31.0 MAI (MYCOBACTERIUM AVIUM-INTRACELLULARE): ICD-10-CM

## 2024-08-21 PROCEDURE — 99214 OFFICE O/P EST MOD 30 MIN: CPT | Mod: S$PBB,,, | Performed by: GENERAL PRACTICE

## 2024-08-21 PROCEDURE — 99999 PR PBB SHADOW E&M-EST. PATIENT-LVL III: CPT | Mod: PBBFAC,,, | Performed by: GENERAL PRACTICE

## 2024-08-21 PROCEDURE — 99213 OFFICE O/P EST LOW 20 MIN: CPT | Mod: PBBFAC | Performed by: GENERAL PRACTICE

## 2024-08-21 RX ORDER — AZITHROMYCIN 500 MG/1
500 TABLET, FILM COATED ORAL DAILY
Qty: 90 TABLET | Refills: 1 | Status: SHIPPED | OUTPATIENT
Start: 2024-08-21 | End: 2025-02-17

## 2024-08-21 RX ORDER — ETHAMBUTOL HYDROCHLORIDE 400 MG/1
1200 TABLET, FILM COATED ORAL DAILY
Qty: 90 TABLET | Refills: 5 | Status: SHIPPED | OUTPATIENT
Start: 2024-08-21 | End: 2025-02-17

## 2024-08-21 RX ORDER — RIFAMPIN 300 MG/1
600 CAPSULE ORAL DAILY
Qty: 180 CAPSULE | Refills: 1 | Status: SHIPPED | OUTPATIENT
Start: 2024-08-21 | End: 2025-02-17

## 2024-08-21 NOTE — PROGRESS NOTES
"Subjective:       Patient ID: Carmen Mckinley 69 y.o.     Chief Complaint:   Chief Complaint   Patient presents with    Follow-up     MAC        HPI:   12/08/2020 (initial evaluation by Dr. Brooks)  The patient is a 65 year old female who is referred to me from her PCP for evaluation of mycobacterial pulmonary infection. It appears that she has been suffering from chronic cough, her symptoms initially started about 3 years ago. She was then treated for "mycobacterial disease" with Azithromycin for about 6 months with improvement of her symptoms. however after stopping abx her symptoms recurred. she has since then been treated with a few regimens of abx (short courses, never combination Rx) without periodic and temporary improvement, only to recur. She denies any fevers, chills, sweats, chest pains (except when her cough is bad). She is here for evaluation and possible treatment.  [facility]), we didn't have the results of these tests with the referral package. She denies any other complaints today.  Will get the [facility] and [facility], as the diagnosis and management will depend on those. specially in light of her prior treatment history.  would like to get new AFB smears and cultures prior to starting therapy  treatment will depend on the clinical, radiologic, and micro data    01/21/2021:  The patient is a pleasant 66-year-old female with chronic cough and Mycobacterium AVM disease, follow-up appointment, we have obtained cultures and lung imaging for her. Her cultures were positive for MAC and this was still clarithromycin sensitive which is good news. 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 She continues to have cough that is chronically productive, daily and nightly, she denies having any fevers or chills or sweats or other complaints today. She is ready to start " treatment.  The patient meets clinical, radiologic, and culture criteria for disease, and thus warrants treatment.  Rx for reticulonodular disease as follows:  Azithromycin 500mg  Ethambutol 2g  Rifampin 600mg  all orally, and three times a week  I discussed each of these meds possible common side effects with the patient and answered all her questions, she is in agreement with the plan.  Repeat imaging in 4 months, as well as cultures.    1/11/2022:  66-year-old female patient known to have past medical history of pulmonary SAE, diagnosed in January 2021 presents for follow-up.  The patient had been on 3 times weekly azithromycin, ethambutol, rifampin from January 2021 up until December 2021.  She had significant improvement in her imaging between January 2021 and April 2021.  However her sputum AFB cultures have remained positive up until most recently in September 2021.  Patient contacted our office again in December 2021 after noticing her cultures remained positive.  At that point, I had multiple discussions with the patient as documented in the chart prior to today's visit, and switched her medication regimen to daily administration of rifampin, ethambutol, azithromycin.  Most recent susceptibility testing shows isolate is still sensitive to macrolides.   She was also experiencing side effects from the rifampin intermittent dosing, and the shape of flulike symptoms.  Since switching to daily dosing of the medication, she reports that her symptoms have subsided.  She notes ongoing cough however no significant changes in its frequency and no increase in sputum production.  She denies any weight loss, no fevers.  No shortness of breath.  She also denies any abdominal pain, no diarrhea, no changes in the urine color or in the stool color.  She denies any fatigue and reports normal appetite.  Most recent blood work done 12/29/2021, showing mild hyperbilirubinemia.  Otherwise normal LFTs. [1]      03/03/2022:    Patient presents today for follow-up.  She reports her cough is minimal and does not have any shortness of breath.  No fevers, no chills, no weight loss, no night sweats.  At our last visit we had paged her regimen from intermittent dosing to daily dosing of azithromycin, rifampin, ethambutol given that she has not cleared her sputum cultures yet despite about a year of therapy.  She reports good tolerability to the antimicrobials.    06/02/2022:  No fevers, no chills, continues to have a mild cough especially in the morning. Otherwise no weight loss and continues with activities of daily living. She is taking Rifampin, Azithromycin, Ethambutol daily at this time. Her most recent cultures collected 03/2022 remain positive after 3 months on daily regimen. Repeat CT scan stable from prior.    07/28/2022  Presents for follow up. Had COVID since last visit but only mild symptoms. Cx sputum from 06/2022 and 04/2022 remain positive for SAE despite 6 months of the daily dosing regimen. Susceptibilities from 04/2022 with no resistance to Macrolides and S to Aminoglycosides. Otherwise clinically feels well, no fevers, no chills.     11/16/2022:  Today for follow-up.  Unfortunately she has developed toxicity from her amikacin.  She reports that about 7-10 days prior to notifying us, she had started noticing some increased fatigue as well as decreased hearing acuity.  Initially did not think much of it as she was nearing the end of her therapy however these symptoms continued to worsen and she informed our office.  At that time around 10/20/2022, amikacin was discontinued.  Referral was made to audiology (after baseline audiology evaluation) and labs ordered by patient's primary care physician. Showing worsening anemia to 9.8 from 12 and ARNOL (creatinine of 1.8 from baseline 0.9). She does report some improvement since discontinuing amikacin.  Although her hearing continues to be affected, her energy is improved and her  appetite is back.  She reports no concerns with urination.  And all-in-all has been doing better since discontinuing Amkacin.  She continues to have the PICC line in place.    12/15/2022:  Since her last visit, she reports some improvement. She has more energy and is more active. She continues to have episodes of diarrhea which are not constant but are significant in terms of affecting her lifestyle. She followed up with audiology who reports her hearing loss is less likely related to Aminoglycoside however, given timing of worsening of hearing loss, it is likely that it contributed to that. She followed up with nephrology and her kidney function is recovering slowly, her GFR is at 41 most recently, she is due to repeat her labs in early January and follow up with nephrology as well as our office. Her repeat cultures collected 11/21/2022 have so far remained negative per my discussion with micro lab.     03/21/2023:  Kidney function improved with creatinine of 1.2 and GFR of 48. She is tolerating antimycobacterials better with no diarrhea since she started eating yogurt regularly. Currently without new concerns. She has negative AFB culture in 11/2022.      06/22/2023:  Kidney function appears to have stabilized with GFR 40-50 in the past 6 months. Patient has returned to her daily activity, she has no significant cough, no night sweats, no fevers, continues to have intermittent episodes of diarrhea but these have been better controlled. No new concerns, needs some refills on medication.     10/26/2023:  No new concerns.  Most recent kidney function appears to have returned to normal.  No fevers, no chills, cough is minimal, with minimal if any sputum production.  No weight loss, no night sweats, no chills.  She continues to have episodic diarrhea with the current medication.    01/30/2024:  Patient had repeat CT scan of the chest earlier this month after about 3 months off antimicrobials.  Unfortunately, this has  shown some progression of disease and new infiltrates especially at the lower lobes bilaterally.  This was compared to a CT scan done back in 05/2023.  In addition, the patient reports that she has starting to have sputum production which is greenish with some thick material it, and cough that has been more persistent at this time.  No significant changes in her shortness of.  Denies any other respiratory symptoms.  No fevers, no chills, no weight loss, no night sweats.  Patient notes no change in her bowel habits, no changes in her urinary habits.    02/28/2024:  Patient presents today for follow-up, we repeated patient's sputum cultures at the previous visit, including 3 AFB cultures.  Typical respiratory cultures however were positive for moderate Pseudomonas aeruginosa that was pansensitive.  We prescribed her a week of ciprofloxacin.  She reports improvement in her symptoms after completion of the course.  Reports improved cough, improved shortness of breath.  However she does have ongoing tickle with repetitive clearing of her throat.  Otherwise, no new concerning symptoms.    03/27/2024:  She presents today for follow-up, she reports ongoing cough which has somewhat worsened, more sputum production, increased shortness of breath.  All tolerable however significantly worsened from prior.  Cultures resulted, noted to have MAC but also mixed cultures with other mycobacteria, mycobacterium avium complex which I suspect to be the major pathogen is susceptible to macrolide.  Of note, she started on Apixaban in 02/2024 after concerns for TIA and concern for SVT in the LLE.     04/10/2024:  Today for follow-up.  Started rifabutin, azithromycin, ethambutol 03/28/2024.  Since then, she reports some intermittent episodes of diarrhea, eating yogurts instead of probiotics but generally tolerating antibiotics. No changes in urinary habits, she reports that the last time she got her labs done she had not drank or eaten  anything in the morning which could account for the increase in her creatinine and drop in her renal function. Has some decreased cough but generally about the same. Still on Apixaban, tolerated Rifabutin well. No excessive bleeding episodes, no bruising.     05/01/2024:  Has been on one month of current treatment. Most noticeable side effect is diarrhea, seems to be more significant now on rifabutin than on Rifampin. No issues with her vision and will resume regular ocular checks for ethambutol toxicity. Cough remains, sputum production slightly improved but overall stable from prior. No significant shortness of breath.     05/29/2024:  Has been on 2 months of therapy with Rifabutin, Ethambutol and Azithromycin. continues to have yellow sputum production and a lot of cough. She has also been having diarrhea 2-3 episodes a few hours after medication but then resolves throughout the day. She did not have this with Rifampin and would like to try switching back to Rifampin now that she is off Apixaban. She is otherwise stable. We continue working on obtaining Arikayce for her. Discussed with Ms. Elías possibility of referral to [facility] given the refractory nature of her disease ans she has been on therapy for about 3-4 years and has not had any significant improvement. She would like us to proceed with referral.      06/26/2024:  Started Arikayce 06/04/2024, she is feeling somewhat better. Tolerating Arikayce quite well. She does have some ongoing cough and sore throat with the inhalations. She is otherwise doing reasonably well. Her diarrhea has resolved with switching back to the Rifampin. No visual disturbances and no auditory concerns, she is urinating well. Most recent lab 2 weeks ago remain with stable renal function.     08/21/2024:  Hearing is well, she had audiology evaluation and it is stable. Her renal functional is stable. Her appetite is low but fluctuates. Continues with cough and sputum  production, only mild improvement in the cough and mucous production. No fevers, no chills,  weight is slowly dropping. Currently having some issues with diarrhea, intermittent episodes and unpredictable urgency, not constant.       Past Medical History:   Diagnosis Date    Anxiety     Arthritis     Depression     GERD (gastroesophageal reflux disease)     Hyperlipidemia     Hypertension         Past Surgical History:   Procedure Laterality Date    APPENDECTOMY       SECTION      ECTOPIC PREGNANCY SURGERY      FRACTURE SURGERY  2021    Wrist    FUNCTIONAL ENDOSCOPIC SINUS SURGERY (FESS) Bilateral     HERNIA REPAIR  1970s    MANDIBLE SURGERY Bilateral     PERIPHERALLY INSERTED CENTRAL CATHETER INSERTION N/A 2022    Procedure: INSERTION, PICC;  Surgeon: Imsael Stroud MD;  Location: [facility];  Service: Infectious Disease;  Laterality: N/A;    TUBAL LIGATION  1989    WRIST FRACTURE SURGERY          Social History     Socioeconomic History    Marital status:    Tobacco Use    Smoking status: Former     Current packs/day: 1.00     Average packs/day: 1 pack/day for 18.0 years (18.0 ttl pk-yrs)     Types: Cigarettes    Smokeless tobacco: Never    Tobacco comments:     Quit in    Substance and Sexual Activity    Alcohol use: Not Currently     Comment: Rarely drink alcohol    Drug use: Not Currently    Sexual activity: Not Currently        Family History   Problem Relation Name Age of Onset    Asbestos Mother Jonna Agosto     Cancer Mother Jonna Agosto         Mesothelioma    Suicide Father Abhishek Agosto     Depression Father Abhishek Agosto         Suicide    Depression Brother Evgeny Agosto         Suicide        Review of patient's allergies indicates:  No Known Allergies       There is no immunization history on file for this patient.     Review of Systems   All other systems reviewed and are negative.         Objective:      /76 (BP Location: Left arm, Patient Position:  "Sitting)   Pulse 74   Temp 98.3 °F (36.8 °C) (Oral)   Resp 18   Ht 5' 5" (1.651 m)   Wt 89.4 kg (197 lb)   SpO2 98%   BMI 32.78 kg/m²      Physical Exam  Constitutional:       Appearance: Normal appearance.   HENT:      Head: Normocephalic and atraumatic.      Mouth/Throat:      Pharynx: No oropharyngeal exudate or posterior oropharyngeal erythema.   Eyes:      Extraocular Movements: Extraocular movements intact.      Pupils: Pupils are equal, round, and reactive to light.   Cardiovascular:      Rate and Rhythm: Normal rate and regular rhythm.      Heart sounds: No murmur heard.  Pulmonary:      Effort: No respiratory distress.      Breath sounds: No wheezing, rhonchi or rales.   Abdominal:      General: Bowel sounds are normal. There is no distension.      Palpations: Abdomen is soft.      Tenderness: There is no abdominal tenderness. There is no right CVA tenderness or left CVA tenderness.   Musculoskeletal:         General: No swelling or tenderness.      Cervical back: Neck supple. No rigidity or tenderness.   Lymphadenopathy:      Cervical: No cervical adenopathy.   Skin:     Findings: No lesion or rash.   Neurological:      General: No focal deficit present.      Mental Status: She is alert and oriented to person, place, and time. Mental status is at baseline.      Cranial Nerves: No cranial nerve deficit.      Motor: No weakness.   Psychiatric:         Mood and Affect: Mood normal.         Behavior: Behavior normal.          Assessment:       Problem List Items Addressed This Visit          ENT    Hearing loss       Renal/    CKD (chronic kidney disease) - Primary    Relevant Orders    CBC Auto Differential    Comprehensive Metabolic Panel       ID    Pulmonary Mycobacterium avium complex (MAC) infection    Relevant Medications    azithromycin (ZITHROMAX) 500 MG tablet    ethambutoL (MYAMBUTOL) 400 MG Tab    Mycobacterium avium complex    Relevant Medications    azithromycin (ZITHROMAX) 500 MG tablet "    ethambutoL (MYAMBUTOL) 400 MG Tab    rifAMpin (RIFADIN) 300 MG capsule    Other Relevant Orders    Mycobacteria and Nocardia Culture    Mycobacteria and Nocardia Culture    Mycobacteria and Nocardia Culture    CBC Auto Differential    Comprehensive Metabolic Panel       GI    Diarrhea     Other Visit Diagnoses       SAE (mycobacterium avium-intracellulare)        Relevant Medications    azithromycin (ZITHROMAX) 500 MG tablet    ethambutoL (MYAMBUTOL) 400 MG Tab                     Plan:     -Fairly healthy patient with obesity initially diagnosed with MAC by PCP in 2017. Reportedly received monotherapy with Azithromycin, ultimately referred to ID in 2020, CT done initially with scarring and bronchiectasis but no cavitation, started on intermittent dosing of Rifampin, Azithromycin and Ethambutol MW, completed about 1 year of this and did not achieve microbiologic clearance. imaging improved, some symptoms remained. 10/2021 cultures remained positive, transitioned to daily dosing of Rifampin, Azithromycin and Ethambutol. improved clinically. Stable imaging.  -Repeated cultures in 06/2022 remained positive for MAC. decided to add Amikacin IV, she almost completed a 3 month course of amikacin but unfortunately developed ARNOL and some acute ototoxicity on chronic hearing impairment towards the end of the course.   -Discontinued Amikacin and continued triple therapy otherwise with renal recovery although not back to baseline. repeat sputum culture in 11/2022 did not grow with patient barely coughing but noted volume was inadequate.  -Continued triple therapy for an additional year until 10/2023 and stopped at that time given significant improvement clinically and minimal if any cough, with minimal if any sputum production.   -Kidney function stabilized and on most recent check, has been fluctuating between normal and mild dysfunction   -Hearing aids have been successful in helping her hearing loss    -at completion  therapy on 10/26/2023, the patient had significantly improved, her cough has resolved, no sputum production., patient completed at that time a year of azithromycin, ethambutol, rifampin after completing initial 3 months of aminoglycosides on 10/2022  -antibiotics were discontinued at that time  -repeat a CT scan done on 01/04/2024 showed new consolidative opacities in the lower lobes compared to the 1 on 05/29/2023 which had stable findings of tree in bud and bronchiectasis compared to the CT prior  -at previous follow-up on 01/2024 she had recurrence of symptoms as well, she was treated or Pseudomonas aeruginosa in sputum cultures with a week of ciprofloxacin which yielded some improvement in the symptoms, only residual symptoms at this time is frequent clearing of the throat  -3 AFB cultures of the sputum repeated 02/08/2024, now with MAC growing as well as mixed colonies of other mycobacteria    -unfortunately it appears that we were not able to successfully eradicate her mycobacterium avium complex from the lungs, and she is again symptomatic.  -this has been a protracted course, the patient has had this infection for the past 5-6 years, despite aggressive therapy were not able to eradicated, however the patient was symptomatically doing much better and had noted some significant improvement during the course of treatment  -discussed the difficulty of eradication of the organism especially with a mixed colonies at this time, also discussed the downsides watchful waiting especially given worsening on imaging and with symptoms since discontinuation of antibiotics   -azithromycin 500 mg p.o. Q 24 hours   -Rifampin 600mg PO q24h   -Ethambutol 15 milligrams/kilograms p.o. Q 24 hours, however given her BMI of 35, compromised renal function, will use adjusted body weight for calculations will start with 1000 mg Q 24 hours  -Started regimen again 03/28/2024    -given refractory in nature of this infection despite  multiple treatment courses and prolonged period of time of the treatments, it is reasonable at this time to consider inhaled Arikayce  -although this medicine can be associated with some nephrotoxicity and ototoxicity, these are minimal and much less significant than any other form of amikacin  -Started on Arikayce 06/05/2024 and has been tolerating it well  -Arikayce being tolerated reasonably well    -patient is having some decreased appetite, initially thought this might be a bad taste or taste change however she notes that that is not actually the problem but rather poor appetite, monitor her appetite as well as diarrhea as these could be limiting factors that lead to discontinuation of air case however at this time are not severe or significant enough, discussed with the patient who agrees with this at this time  -Repeat mycobacterial sputum cultures  -CT scan of the chest on 07/09/2024 significantly improved   -Continue every 14 days labs for now given fluctuation in renal function  -No changes in vision, will follow up with her eye doctor    -it is to note that the patient does have mixed colonies in her lungs, 1 of which is very resistant mycobacterium, I do suspect the mycobacterium avium complex to be the 1 causing most of the damage, patient had good stability when we were treating it, only started to progress when treatment was discontinued      Follow up in about 8 weeks (around 10/16/2024).    35 minutes of total time spent on the encounter, which includes face to face time and non-face to face time preparing to see the patient (eg, review of tests), Obtaining and/or reviewing separately obtained history, Documenting clinical information in the electronic or other health record, Independently interpreting results (not separately reported) and communicating results to the patient/family/caregiver, or Care coordination (not separately reported).

## 2024-08-22 ENCOUNTER — LAB VISIT (OUTPATIENT)
Dept: LAB | Facility: HOSPITAL | Age: 69
End: 2024-08-22
Attending: GENERAL PRACTICE
Payer: MEDICARE

## 2024-08-22 DIAGNOSIS — A31.0 MYCOBACTERIUM AVIUM COMPLEX: ICD-10-CM

## 2024-08-22 PROCEDURE — 87116 MYCOBACTERIA CULTURE: CPT | Mod: 91

## 2024-08-27 DIAGNOSIS — A31.0 MYCOBACTERIUM AVIUM COMPLEX: ICD-10-CM

## 2024-08-27 RX ORDER — AMIKACIN 590 MG/8.4ML
1 SUSPENSION RESPIRATORY (INHALATION) DAILY
Qty: 30 EACH | Refills: 0 | Status: SHIPPED | OUTPATIENT
Start: 2024-08-27 | End: 2024-08-27

## 2024-08-27 RX ORDER — AMIKACIN 590 MG/8.4ML
1 SUSPENSION RESPIRATORY (INHALATION) DAILY
Qty: 30 EACH | Refills: 2 | Status: SHIPPED | OUTPATIENT
Start: 2024-08-27 | End: 2024-08-28

## 2024-08-28 RX ORDER — AMIKACIN 590 MG/8.4ML
1 SUSPENSION RESPIRATORY (INHALATION) DAILY
Qty: 30 EACH | Refills: 2 | Status: SHIPPED | OUTPATIENT
Start: 2024-08-28

## 2024-08-29 DIAGNOSIS — A31.0 MYCOBACTERIUM AVIUM COMPLEX: Primary | ICD-10-CM

## 2024-09-03 ENCOUNTER — LAB VISIT (OUTPATIENT)
Dept: LAB | Facility: HOSPITAL | Age: 69
End: 2024-09-03
Attending: GENERAL PRACTICE
Payer: MEDICARE

## 2024-09-03 DIAGNOSIS — A31.0 MYCOBACTERIUM AVIUM COMPLEX: ICD-10-CM

## 2024-09-03 PROCEDURE — 87206 SMEAR FLUORESCENT/ACID STAI: CPT

## 2024-09-04 ENCOUNTER — LAB VISIT (OUTPATIENT)
Dept: LAB | Facility: HOSPITAL | Age: 69
End: 2024-09-04
Attending: GENERAL PRACTICE
Payer: MEDICARE

## 2024-09-04 DIAGNOSIS — A31.0 MYCOBACTERIUM AVIUM COMPLEX: ICD-10-CM

## 2024-09-04 LAB — RHODAMINE-AURAMINE STN SPEC: NORMAL

## 2024-09-04 PROCEDURE — 87206 SMEAR FLUORESCENT/ACID STAI: CPT

## 2024-09-05 ENCOUNTER — LAB VISIT (OUTPATIENT)
Dept: LAB | Facility: HOSPITAL | Age: 69
End: 2024-09-05
Attending: GENERAL PRACTICE
Payer: MEDICARE

## 2024-09-05 DIAGNOSIS — A31.0 MYCOBACTERIUM AVIUM COMPLEX: ICD-10-CM

## 2024-09-05 LAB — RHODAMINE-AURAMINE STN SPEC: NORMAL

## 2024-09-05 PROCEDURE — 87206 SMEAR FLUORESCENT/ACID STAI: CPT

## 2024-09-06 ENCOUNTER — LAB VISIT (OUTPATIENT)
Dept: LAB | Facility: HOSPITAL | Age: 69
End: 2024-09-06
Attending: GENERAL PRACTICE
Payer: MEDICARE

## 2024-09-06 DIAGNOSIS — A31.0 MYCOBACTERIUM AVIUM COMPLEX: ICD-10-CM

## 2024-09-06 DIAGNOSIS — A31.0 MYCOBACTERIUM AVIUM COMPLEX: Primary | ICD-10-CM

## 2024-09-06 LAB — RHODAMINE-AURAMINE STN SPEC: NORMAL

## 2024-09-06 PROCEDURE — 87206 SMEAR FLUORESCENT/ACID STAI: CPT

## 2024-09-09 ENCOUNTER — LAB VISIT (OUTPATIENT)
Dept: LAB | Facility: HOSPITAL | Age: 69
End: 2024-09-09
Attending: GENERAL PRACTICE
Payer: MEDICARE

## 2024-09-09 DIAGNOSIS — A31.0 MYCOBACTERIUM AVIUM COMPLEX: ICD-10-CM

## 2024-09-09 LAB — RHODAMINE-AURAMINE STN SPEC: NORMAL

## 2024-09-09 PROCEDURE — 87206 SMEAR FLUORESCENT/ACID STAI: CPT

## 2024-09-10 LAB — RHODAMINE-AURAMINE STN SPEC: NORMAL

## 2024-09-18 ENCOUNTER — LAB VISIT (OUTPATIENT)
Dept: LAB | Facility: HOSPITAL | Age: 69
End: 2024-09-18
Attending: GENERAL PRACTICE
Payer: MEDICARE

## 2024-09-18 DIAGNOSIS — A31.0 MYCOBACTERIUM AVIUM COMPLEX: ICD-10-CM

## 2024-09-18 DIAGNOSIS — N18.9 CHRONIC KIDNEY DISEASE, UNSPECIFIED CKD STAGE: ICD-10-CM

## 2024-09-18 LAB
ALBUMIN SERPL-MCNC: 3.9 G/DL (ref 3.4–4.8)
ALBUMIN/GLOB SERPL: 1.2 RATIO (ref 1.1–2)
ALP SERPL-CCNC: 25 UNIT/L (ref 40–150)
ALT SERPL-CCNC: 12 UNIT/L (ref 0–55)
ANION GAP SERPL CALC-SCNC: 11 MEQ/L
AST SERPL-CCNC: 20 UNIT/L (ref 5–34)
BASOPHILS # BLD AUTO: 0.03 X10(3)/MCL
BASOPHILS NFR BLD AUTO: 0.6 %
BILIRUB SERPL-MCNC: 0.7 MG/DL
BUN SERPL-MCNC: 20 MG/DL (ref 9.8–20.1)
CALCIUM SERPL-MCNC: 9.2 MG/DL (ref 8.4–10.2)
CHLORIDE SERPL-SCNC: 100 MMOL/L (ref 98–107)
CO2 SERPL-SCNC: 24 MMOL/L (ref 23–31)
CREAT SERPL-MCNC: 1.29 MG/DL (ref 0.55–1.02)
CREAT/UREA NIT SERPL: 16
EOSINOPHIL # BLD AUTO: 0.28 X10(3)/MCL (ref 0–0.9)
EOSINOPHIL NFR BLD AUTO: 5.7 %
ERYTHROCYTE [DISTWIDTH] IN BLOOD BY AUTOMATED COUNT: 13.2 % (ref 11.5–17)
GFR SERPLBLD CREATININE-BSD FMLA CKD-EPI: 44 ML/MIN/1.73/M2
GLOBULIN SER-MCNC: 3.2 GM/DL (ref 2.4–3.5)
GLUCOSE SERPL-MCNC: 115 MG/DL (ref 82–115)
HCT VFR BLD AUTO: 34.1 % (ref 37–47)
HGB BLD-MCNC: 11.3 G/DL (ref 12–16)
IMM GRANULOCYTES # BLD AUTO: 0.03 X10(3)/MCL (ref 0–0.04)
IMM GRANULOCYTES NFR BLD AUTO: 0.6 %
LYMPHOCYTES # BLD AUTO: 0.92 X10(3)/MCL (ref 0.6–4.6)
LYMPHOCYTES NFR BLD AUTO: 18.8 %
MCH RBC QN AUTO: 30.7 PG (ref 27–31)
MCHC RBC AUTO-ENTMCNC: 33.1 G/DL (ref 33–36)
MCV RBC AUTO: 92.7 FL (ref 80–94)
MONOCYTES # BLD AUTO: 0.6 X10(3)/MCL (ref 0.1–1.3)
MONOCYTES NFR BLD AUTO: 12.2 %
NEUTROPHILS # BLD AUTO: 3.04 X10(3)/MCL (ref 2.1–9.2)
NEUTROPHILS NFR BLD AUTO: 62.1 %
NRBC BLD AUTO-RTO: 0 %
PLATELET # BLD AUTO: 270 X10(3)/MCL (ref 130–400)
PMV BLD AUTO: 9.7 FL (ref 7.4–10.4)
POTASSIUM SERPL-SCNC: 4.4 MMOL/L (ref 3.5–5.1)
PROT SERPL-MCNC: 7.1 GM/DL (ref 5.8–7.6)
RBC # BLD AUTO: 3.68 X10(6)/MCL (ref 4.2–5.4)
SODIUM SERPL-SCNC: 135 MMOL/L (ref 136–145)
WBC # BLD AUTO: 4.9 X10(3)/MCL (ref 4.5–11.5)

## 2024-09-18 PROCEDURE — 85025 COMPLETE CBC W/AUTO DIFF WBC: CPT

## 2024-09-18 PROCEDURE — 80053 COMPREHEN METABOLIC PANEL: CPT

## 2024-09-18 PROCEDURE — 36415 COLL VENOUS BLD VENIPUNCTURE: CPT

## 2024-10-02 ENCOUNTER — LAB VISIT (OUTPATIENT)
Dept: LAB | Facility: HOSPITAL | Age: 69
End: 2024-10-02
Attending: GENERAL PRACTICE
Payer: MEDICARE

## 2024-10-02 DIAGNOSIS — A31.0 MYCOBACTERIUM AVIUM COMPLEX: ICD-10-CM

## 2024-10-02 DIAGNOSIS — N18.9 CHRONIC KIDNEY DISEASE, UNSPECIFIED CKD STAGE: ICD-10-CM

## 2024-10-02 LAB
ALBUMIN SERPL-MCNC: 3.9 G/DL (ref 3.4–4.8)
ALBUMIN/GLOB SERPL: 1.3 RATIO (ref 1.1–2)
ALP SERPL-CCNC: 25 UNIT/L (ref 40–150)
ALT SERPL-CCNC: 12 UNIT/L (ref 0–55)
ANION GAP SERPL CALC-SCNC: 8 MEQ/L
AST SERPL-CCNC: 18 UNIT/L (ref 5–34)
BASOPHILS # BLD AUTO: 0.05 X10(3)/MCL
BASOPHILS NFR BLD AUTO: 0.9 %
BILIRUB SERPL-MCNC: 0.6 MG/DL
BUN SERPL-MCNC: 17.2 MG/DL (ref 9.8–20.1)
CALCIUM SERPL-MCNC: 9.4 MG/DL (ref 8.4–10.2)
CHLORIDE SERPL-SCNC: 101 MMOL/L (ref 98–107)
CO2 SERPL-SCNC: 26 MMOL/L (ref 23–31)
CREAT SERPL-MCNC: 1.1 MG/DL (ref 0.55–1.02)
CREAT/UREA NIT SERPL: 16
EOSINOPHIL # BLD AUTO: 0.19 X10(3)/MCL (ref 0–0.9)
EOSINOPHIL NFR BLD AUTO: 3.4 %
ERYTHROCYTE [DISTWIDTH] IN BLOOD BY AUTOMATED COUNT: 13.3 % (ref 11.5–17)
GFR SERPLBLD CREATININE-BSD FMLA CKD-EPI: 54 ML/MIN/1.73/M2
GLOBULIN SER-MCNC: 3.1 GM/DL (ref 2.4–3.5)
GLUCOSE SERPL-MCNC: 95 MG/DL (ref 82–115)
HCT VFR BLD AUTO: 33.4 % (ref 37–47)
HGB BLD-MCNC: 11.1 G/DL (ref 12–16)
IMM GRANULOCYTES # BLD AUTO: 0.02 X10(3)/MCL (ref 0–0.04)
IMM GRANULOCYTES NFR BLD AUTO: 0.4 %
LYMPHOCYTES # BLD AUTO: 0.92 X10(3)/MCL (ref 0.6–4.6)
LYMPHOCYTES NFR BLD AUTO: 16.4 %
MCH RBC QN AUTO: 31.5 PG (ref 27–31)
MCHC RBC AUTO-ENTMCNC: 33.2 G/DL (ref 33–36)
MCV RBC AUTO: 94.9 FL (ref 80–94)
MONOCYTES # BLD AUTO: 0.69 X10(3)/MCL (ref 0.1–1.3)
MONOCYTES NFR BLD AUTO: 12.3 %
NEUTROPHILS # BLD AUTO: 3.75 X10(3)/MCL (ref 2.1–9.2)
NEUTROPHILS NFR BLD AUTO: 66.6 %
NRBC BLD AUTO-RTO: 0 %
PLATELET # BLD AUTO: 271 X10(3)/MCL (ref 130–400)
PMV BLD AUTO: 9.8 FL (ref 7.4–10.4)
POTASSIUM SERPL-SCNC: 4.3 MMOL/L (ref 3.5–5.1)
PROT SERPL-MCNC: 7 GM/DL (ref 5.8–7.6)
RBC # BLD AUTO: 3.52 X10(6)/MCL (ref 4.2–5.4)
SODIUM SERPL-SCNC: 135 MMOL/L (ref 136–145)
WBC # BLD AUTO: 5.62 X10(3)/MCL (ref 4.5–11.5)

## 2024-10-02 PROCEDURE — 80053 COMPREHEN METABOLIC PANEL: CPT

## 2024-10-02 PROCEDURE — 36415 COLL VENOUS BLD VENIPUNCTURE: CPT

## 2024-10-02 PROCEDURE — 85025 COMPLETE CBC W/AUTO DIFF WBC: CPT

## 2024-10-02 NOTE — PROGRESS NOTES
"Subjective:       Patient ID: Carmen Mckinley 69 y.o.     Chief Complaint:   Chief Complaint   Patient presents with    Follow-up        HPI:   12/08/2020 (initial evaluation by Dr. Brooks)  The patient is a 65 year old female who is referred to me from her PCP for evaluation of mycobacterial pulmonary infection. It appears that she has been suffering from chronic cough, her symptoms initially started about 3 years ago. She was then treated for "mycobacterial disease" with Azithromycin for about 6 months with improvement of her symptoms. however after stopping abx her symptoms recurred. she has since then been treated with a few regimens of abx (short courses, never combination Rx) without periodic and temporary improvement, only to recur. She denies any fevers, chills, sweats, chest pains (except when her cough is bad). She is here for evaluation and possible treatment.  She had imaging and cultures done at [facility] back in August (the latest), we didn't have the results of these tests with the referral package. She denies any other complaints today.  Will get the latest cultures and imaging studies from [facility], as the diagnosis and management will depend on those. specially in light of her prior treatment history.  would like to get new AFB smears and cultures prior to starting therapy  treatment will depend on the clinical, radiologic, and micro data    01/21/2021:  The patient is a pleasant 66-year-old female with chronic cough and Mycobacterium AVM disease, follow-up appointment, we have obtained cultures and lung imaging for her. Her cultures were positive for MAC and this was still clarithromycin sensitive which is good news. 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 She continues to have cough that is chronically productive, daily and nightly, she denies having any fevers or chills or sweats or other complaints today. She is ready to start " Subjective   Patient ID: Nia Harper is a 34 y.o. female who presents for Follow-up.    HPI  The patient is a 34 year old Female with a PMHx of DMII on insulin, marijuana use, ER+/AZ+ right breast multicentric breast cancer, invasive ductal carcinoma (IDC), grade 2-3, ER/AZ+, HER2- s/p right mastectomy 6/30/2023, gastric sleeve 3/25/2024, HTN, HLD, here for:    1- preoperative clearance for her left breast preventive mastectomy and bilateral reconstruction  with Dr. Schroeder and Dr. Borrero.  -DM II- needs clearance form endocrinologist. Last A1C: 7.8% (7/2024)  -HTN- controlled.   -Last stress test done 6/2023- Hypertensive response with / 92.  Cardiac clearance recommended.      A review of system was completed.  All systems were reviewed and were normal, except for the ones that are listed in the HPI.    Objective   Physical Exam  Constitutional:       Appearance: Normal appearance.   HENT:      Head: Normocephalic and atraumatic.      Right Ear: Tympanic membrane, ear canal and external ear normal.      Left Ear: Tympanic membrane, ear canal and external ear normal.      Nose: Nose normal.      Mouth/Throat:      Mouth: Mucous membranes are moist.      Pharynx: Oropharynx is clear.   Eyes:      Extraocular Movements: Extraocular movements intact.      Conjunctiva/sclera: Conjunctivae normal.      Pupils: Pupils are equal, round, and reactive to light.   Cardiovascular:      Rate and Rhythm: Normal rate and regular rhythm.      Pulses: Normal pulses.   Pulmonary:      Effort: Pulmonary effort is normal.      Breath sounds: Normal breath sounds.   Abdominal:      General: Abdomen is flat. Bowel sounds are normal.      Palpations: Abdomen is soft.   Musculoskeletal:         General: Normal range of motion.      Cervical back: Normal range of motion and neck supple.   Skin:     General: Skin is warm.   Neurological:      General: No focal deficit present.      Mental Status: She is alert and oriented to  person, place, and time. Mental status is at baseline.   Psychiatric:         Mood and Affect: Mood normal.         Behavior: Behavior normal.         Thought Content: Thought content normal.         Judgment: Judgment normal.     Assessment/Plan   Problem List Items Addressed This Visit       Diabetes mellitus type 2 without retinopathy (Multi) - Primary    Relevant Orders    CBC    Comprehensive Metabolic Panel    TSH with reflex to Free T4 if abnormal    Protime-INR    LDL cholesterol, direct    Pre-op evaluation    Relevant Orders    CBC    Comprehensive Metabolic Panel    TSH with reflex to Free T4 if abnormal    Protime-INR    LDL cholesterol, direct    Pre-operative clearance     Preoperative clearance for her left breast preventive mastectomy and bilateral reconstruction  with Dr. Schroeder and Dr. Borrero.  -DM II- needs Clearance form endocrinologist. Last A1C: 7.8% (7/2024)  -HTN- controlled.   -Last stress test done 6/2023- Hypertensive response with / 92.  Cardiac clearance recommended.          Patient to return to office after your clearances.    treatment.  The patient meets clinical, radiologic, and culture criteria for disease, and thus warrants treatment.  Rx for reticulonodular disease as follows:  Azithromycin 500mg  Ethambutol 2g  Rifampin 600mg  all orally, and three times a week  I discussed each of these meds possible common side effects with the patient and answered all her questions, she is in agreement with the plan.  Repeat imaging in 4 months, as well as cultures.    1/11/2022:  66-year-old female patient known to have past medical history of pulmonary SAE, diagnosed in January 2021 presents for follow-up.  The patient had been on 3 times weekly azithromycin, ethambutol, rifampin from January 2021 up until December 2021.  She had significant improvement in her imaging between January 2021 and April 2021.  However her sputum AFB cultures have remained positive up until most recently in September 2021.  Patient contacted our office again in December 2021 after noticing her cultures remained positive.  At that point, I had multiple discussions with the patient as documented in the chart prior to today's visit, and switched her medication regimen to daily administration of rifampin, ethambutol, azithromycin.  Most recent susceptibility testing shows isolate is still sensitive to macrolides.   She was also experiencing side effects from the rifampin intermittent dosing, and the shape of flulike symptoms.  Since switching to daily dosing of the medication, she reports that her symptoms have subsided.  She notes ongoing cough however no significant changes in its frequency and no increase in sputum production.  She denies any weight loss, no fevers.  No shortness of breath.  She also denies any abdominal pain, no diarrhea, no changes in the urine color or in the stool color.  She denies any fatigue and reports normal appetite.  Most recent blood work done 12/29/2021, showing mild hyperbilirubinemia.  Otherwise normal LFTs. [1]      03/03/2022:    Patient presents today for follow-up.  She reports her cough is minimal and does not have any shortness of breath.  No fevers, no chills, no weight loss, no night sweats.  At our last visit we had paged her regimen from intermittent dosing to daily dosing of azithromycin, rifampin, ethambutol given that she has not cleared her sputum cultures yet despite about a year of therapy.  She reports good tolerability to the antimicrobials.    06/02/2022:  No fevers, no chills, continues to have a mild cough especially in the morning. Otherwise no weight loss and continues with activities of daily living. She is taking Rifampin, Azithromycin, Ethambutol daily at this time. Her most recent cultures collected 03/2022 remain positive after 3 months on daily regimen. Repeat CT scan stable from prior.    07/28/2022  Presents for follow up. Had COVID since last visit but only mild symptoms. Cx sputum from 06/2022 and 04/2022 remain positive for SAE despite 6 months of the daily dosing regimen. Susceptibilities from 04/2022 with no resistance to Macrolides and S to Aminoglycosides. Otherwise clinically feels well, no fevers, no chills.     11/16/2022:  Today for follow-up.  Unfortunately she has developed toxicity from her amikacin.  She reports that about 7-10 days prior to notifying us, she had started noticing some increased fatigue as well as decreased hearing acuity.  Initially did not think much of it as she was nearing the end of her therapy however these symptoms continued to worsen and she informed our office.  At that time around 10/20/2022, amikacin was discontinued.  Referral was made to audiology (after baseline audiology evaluation) and labs ordered by patient's primary care physician. Showing worsening anemia to 9.8 from 12 and ARNOL (creatinine of 1.8 from baseline 0.9). She does report some improvement since discontinuing amikacin.  Although her hearing continues to be affected, her energy is improved and her  appetite is back.  She reports no concerns with urination.  And all-in-all has been doing better since discontinuing Amkacin.  She continues to have the PICC line in place.    12/15/2022:  Since her last visit, she reports some improvement. She has more energy and is more active. She continues to have episodes of diarrhea which are not constant but are significant in terms of affecting her lifestyle. She followed up with audiology who reports her hearing loss is less likely related to Aminoglycoside however, given timing of worsening of hearing loss, it is likely that it contributed to that. She followed up with nephrology and her kidney function is recovering slowly, her GFR is at 41 most recently, she is due to repeat her labs in early January and follow up with nephrology as well as our office. Her repeat cultures collected 11/21/2022 have so far remained negative per my discussion with micro lab.     03/21/2023:  Kidney function improved with creatinine of 1.2 and GFR of 48. She is tolerating antimycobacterials better with no diarrhea since she started eating yogurt regularly. Currently without new concerns. She has negative AFB culture in 11/2022.      06/22/2023:  Kidney function appears to have stabilized with GFR 40-50 in the past 6 months. Patient has returned to her daily activity, she has no significant cough, no night sweats, no fevers, continues to have intermittent episodes of diarrhea but these have been better controlled. No new concerns, needs some refills on medication.     10/26/2023:  No new concerns.  Most recent kidney function appears to have returned to normal.  No fevers, no chills, cough is minimal, with minimal if any sputum production.  No weight loss, no night sweats, no chills.  She continues to have episodic diarrhea with the current medication.    01/30/2024:  Patient had repeat CT scan of the chest earlier this month after about 3 months off antimicrobials.  Unfortunately, this has  shown some progression of disease and new infiltrates especially at the lower lobes bilaterally.  This was compared to a CT scan done back in 05/2023.  In addition, the patient reports that she has starting to have sputum production which is greenish with some thick material it, and cough that has been more persistent at this time.  No significant changes in her shortness of.  Denies any other respiratory symptoms.  No fevers, no chills, no weight loss, no night sweats.  Patient notes no change in her bowel habits, no changes in her urinary habits.    02/28/2024:  Patient presents today for follow-up, we repeated patient's sputum cultures at the previous visit, including 3 AFB cultures.  Typical respiratory cultures however were positive for moderate Pseudomonas aeruginosa that was pansensitive.  We prescribed her a week of ciprofloxacin.  She reports improvement in her symptoms after completion of the course.  Reports improved cough, improved shortness of breath.  However she does have ongoing tickle with repetitive clearing of her throat.  Otherwise, no new concerning symptoms.    03/27/2024:  She presents today for follow-up, she reports ongoing cough which has somewhat worsened, more sputum production, increased shortness of breath.  All tolerable however significantly worsened from prior.  Cultures resulted, noted to have MAC but also mixed cultures with other mycobacteria, mycobacterium avium complex which I suspect to be the major pathogen is susceptible to macrolide.  Of note, she started on Apixaban in 02/2024 after concerns for TIA and concern for SVT in the LLE.     04/10/2024:  Today for follow-up.  Started rifabutin, azithromycin, ethambutol 03/28/2024.  Since then, she reports some intermittent episodes of diarrhea, eating yogurts instead of probiotics but generally tolerating antibiotics. No changes in urinary habits, she reports that the last time she got her labs done she had not drank or eaten  anything in the morning which could account for the increase in her creatinine and drop in her renal function. Has some decreased cough but generally about the same. Still on Apixaban, tolerated Rifabutin well. No excessive bleeding episodes, no bruising.     05/01/2024:  Has been on one month of current treatment. Most noticeable side effect is diarrhea, seems to be more significant now on rifabutin than on Rifampin. No issues with her vision and will resume regular ocular checks for ethambutol toxicity. Cough remains, sputum production slightly improved but overall stable from prior. No significant shortness of breath.     05/29/2024:  Has been on 2 months of therapy with Rifabutin, Ethambutol and Azithromycin. continues to have yellow sputum production and a lot of cough. She has also been having diarrhea 2-3 episodes a few hours after medication but then resolves throughout the day. She did not have this with Rifampin and would like to try switching back to Rifampin now that she is off Apixaban. She is otherwise stable. We continue working on obtaining Arikayce for her. Discussed with Ms. Elías possibility of referral to [facility] given the refractory nature of her disease ans she has been on therapy for about 3-4 years and has not had any significant improvement. She would like us to proceed with referral.      06/26/2024:  Started Arikayce 06/04/2024, she is feeling somewhat better. Tolerating Arikayce quite well. She does have some ongoing cough and sore throat with the inhalations. She is otherwise doing reasonably well. Her diarrhea has resolved with switching back to the Rifampin. No visual disturbances and no auditory concerns, she is urinating well. Most recent lab 2 weeks ago remain with stable renal function.     Past Medical History:   Diagnosis Date    Anxiety     Arthritis     Depression     GERD (gastroesophageal reflux disease)     Hyperlipidemia     Hypertension         Past Surgical  "History:   Procedure Laterality Date    APPENDECTOMY       SECTION      ECTOPIC PREGNANCY SURGERY      FRACTURE SURGERY  2021    Wrist    FUNCTIONAL ENDOSCOPIC SINUS SURGERY (FESS) Bilateral     HERNIA REPAIR  1970s    MANDIBLE SURGERY Bilateral     PERIPHERALLY INSERTED CENTRAL CATHETER INSERTION N/A 2022    Procedure: INSERTION, PICC;  Surgeon: Ismael Stroud MD;  Location: [facility] OR;  Service: Infectious Disease;  Laterality: N/A;    TUBAL LIGATION  1989    WRIST FRACTURE SURGERY          Social History     Socioeconomic History    Marital status:    Tobacco Use    Smoking status: Former     Current packs/day: 1.00     Average packs/day: 1 pack/day for 18.0 years (18.0 ttl pk-yrs)     Types: Cigarettes    Smokeless tobacco: Never    Tobacco comments:     Quit in    Substance and Sexual Activity    Alcohol use: Not Currently     Comment: Rarely drink alcohol    Drug use: Not Currently    Sexual activity: Not Currently        Family History   Problem Relation Name Age of Onset    Asbestos Mother Jonna Agosto     Cancer Mother Jonna Agosto         Mesothelioma    Suicide Father Abhishek Agosto     Depression Father Abhishek Agosto         Suicide    Depression Brother Evgeny Agosto         Suicide        Review of patient's allergies indicates:  No Known Allergies       There is no immunization history on file for this patient.     Review of Systems   All other systems reviewed and are negative.         Objective:      BP (!) 147/71 (BP Location: Left arm, Patient Position: Sitting)   Pulse 98   Temp 98.7 °F (37.1 °C) (Oral)   Resp 18   Ht 5' 5" (1.651 m)   Wt 91.6 kg (202 lb)   SpO2 97%   BMI 33.61 kg/m²      Physical Exam  Constitutional:       Appearance: Normal appearance.   HENT:      Head: Normocephalic and atraumatic.      Mouth/Throat:      Pharynx: No oropharyngeal exudate or posterior oropharyngeal erythema.   Eyes:      Extraocular Movements: Extraocular " movements intact.      Pupils: Pupils are equal, round, and reactive to light.   Cardiovascular:      Rate and Rhythm: Normal rate and regular rhythm.      Heart sounds: No murmur heard.  Pulmonary:      Effort: No respiratory distress.      Breath sounds: No wheezing, rhonchi or rales.   Abdominal:      General: Bowel sounds are normal. There is no distension.      Palpations: Abdomen is soft.      Tenderness: There is no abdominal tenderness. There is no right CVA tenderness or left CVA tenderness.   Musculoskeletal:         General: No swelling or tenderness.      Cervical back: Neck supple. No rigidity or tenderness.   Lymphadenopathy:      Cervical: No cervical adenopathy.   Skin:     Findings: No lesion or rash.   Neurological:      General: No focal deficit present.      Mental Status: She is alert and oriented to person, place, and time. Mental status is at baseline.      Cranial Nerves: No cranial nerve deficit.      Motor: No weakness.   Psychiatric:         Mood and Affect: Mood normal.         Behavior: Behavior normal.            Assessment:       Problem List Items Addressed This Visit          ENT    Hearing loss       Pulmonary    Chronic cough    Bronchiectasis without complication       Renal/    CKD (chronic kidney disease)       ID    Pulmonary Mycobacterium avium complex (MAC) infection    Relevant Medications    ethambutoL (MYAMBUTOL) 400 MG Tab    Mycobacterium avium complex - Primary    Relevant Medications    ethambutoL (MYAMBUTOL) 400 MG Tab    Other Relevant Orders    CT Chest Without Contrast    CBC Auto Differential    Comprehensive Metabolic Panel     Other Visit Diagnoses       SAE (mycobacterium avium-intracellulare)        Relevant Medications    ethambutoL (MYAMBUTOL) 400 MG Tab                   Plan:     -Fairly healthy patient with obesity initially diagnosed with MAC by PCP in 2017. Reportedly received monotherapy with Azithromycin, ultimately referred to ID in 2020, CT done  initially with scarring and bronchiectasis but no cavitation, started on intermittent dosing of Rifampin, Azithromycin and Ethambutol [facility], completed about 1 year of this and did not achieve microbiologic clearance. imaging improved, some symptoms remained. 10/2021 cultures remained positive, transitioned to daily dosing of Rifampin, Azithromycin and Ethambutol. improved clinically. Stable imaging.  -Repeated cultures in 06/2022 remained positive for MAC. decided to add Amikacin IV, she almost completed a 3 month course of amikacin but unfortunately developed ARNOL and some acute ototoxicity on chronic hearing impairment towards the end of the course.   -Discontinued Amikacin and continued triple therapy otherwise with renal recovery although not back to baseline. repeat sputum culture in 11/2022 did not grow with patient barely coughing but noted volume was inadequate.  -Continued triple therapy for an additional year until 10/2023 and stopped at that time given significant improvement clinically and minimal if any cough, with minimal if any sputum production.   -Kidney function stabilized and on most recent check, has been fluctuating between normal and mild dysfunction   -Hearing aids have been successful in helping her hearing loss    -at completion therapy on 10/26/2023, the patient had significantly improved, her cough has resolved, no sputum production., patient completed at that time a year of azithromycin, ethambutol, rifampin after completing initial 3 months of aminoglycosides on 10/2022  -antibiotics were discontinued at that time  -repeat a CT scan done on 01/04/2024 showed new consolidative opacities in the lower lobes compared to the 1 on 05/29/2023 which had stable findings of tree in bud and bronchiectasis compared to the CT prior  -at previous follow-up on 01/2024 she had recurrence of symptoms as well, she was treated or Pseudomonas aeruginosa in sputum cultures with a week of ciprofloxacin which  yielded some improvement in the symptoms, only residual symptoms at this time is frequent clearing of the throat  -3 AFB cultures of the sputum repeated 02/08/2024, now with MAC growing as well as mixed colonies of other mycobacteria    -unfortunately it appears that we were not able to successfully eradicate her mycobacterium avium complex from the lungs, and she is again symptomatic.  -this has been a protracted course, the patient has had this infection for the past 5-6 years, despite aggressive therapy were not able to eradicated, however the patient was symptomatically doing much better and had noted some significant improvement during the course of treatment  -discussed the difficulty of eradication of the organism especially with a mixed colonies at this time, also discussed the downsides watchful waiting especially given worsening on imaging and with symptoms since discontinuation of antibiotics   -azithromycin 500 mg p.o. Q 24 hours   -Rifampin 600mg PO q24h   -Ethambutol 15 milligrams/kilograms p.o. Q 24 hours, however given her BMI of 35, compromised renal function, will use adjusted body weight for calculations will start with 1000 mg Q 24 hours  -Started regimen again 03/28/2024    -given refractory in nature of this infection despite multiple treatment courses and prolonged period of time of the treatments, it is reasonable at this time to consider inhaled Arikayce  -although this medicine can be associated with some nephrotoxicity and ototoxicity, these are minimal and much less significant than any other form of amikacin  -Started on Arikayce 06/05/2024 and has been tolerating it well  -Audiology in the next 2 weeks and scheduled follow up after that  -q2 weeks labs while on Arikayce and the above medication at least in the early phase  -[facility] did reach out to the patient but deferred referral until after trial of flutter valve and Arikayce  -Repeat CT scan in 4 weeks (6 months after  previous and 2 months into arikayce)  -Repeat sputum cultures at 8-12 weeks elver (next visit)  -diarrhea resolved  -Arikayce being tolerated reasonably well    -it is to note that the patient does have mixed colonies in her lungs, 1 of which is very resistant mycobacterium, I do suspect the mycobacterium avium complex to be the 1 causing most of the damage, patient had good stability when we were treating it, only started to progress when treatment was discontinued    Follow up in about 8 years (around 6/26/2032).    50 minutes of total time spent on the encounter, which includes face to face time and non-face to face time preparing to see the patient (eg, review of tests), Obtaining and/or reviewing separately obtained history, Documenting clinical information in the electronic or other health record, Independently interpreting results (not separately reported) and communicating results to the patient/family/caregiver, or Care coordination (not separately reported).

## 2024-10-05 LAB
CEFTIBUTEN ISLT MIC: NORMAL

## 2024-10-16 ENCOUNTER — LAB VISIT (OUTPATIENT)
Dept: LAB | Facility: HOSPITAL | Age: 69
End: 2024-10-16
Attending: GENERAL PRACTICE
Payer: MEDICARE

## 2024-10-16 DIAGNOSIS — A31.0 MYCOBACTERIUM AVIUM COMPLEX: ICD-10-CM

## 2024-10-16 DIAGNOSIS — N18.9 CHRONIC KIDNEY DISEASE, UNSPECIFIED CKD STAGE: ICD-10-CM

## 2024-10-16 LAB
ALBUMIN SERPL-MCNC: 3.8 G/DL (ref 3.4–4.8)
ALBUMIN/GLOB SERPL: 1.1 RATIO (ref 1.1–2)
ALP SERPL-CCNC: 26 UNIT/L (ref 40–150)
ALT SERPL-CCNC: 10 UNIT/L (ref 0–55)
ANION GAP SERPL CALC-SCNC: 10 MEQ/L
AST SERPL-CCNC: 17 UNIT/L (ref 5–34)
BASOPHILS # BLD AUTO: 0.03 X10(3)/MCL
BASOPHILS NFR BLD AUTO: 0.6 %
BILIRUB SERPL-MCNC: 0.5 MG/DL
BUN SERPL-MCNC: 16.9 MG/DL (ref 9.8–20.1)
CALCIUM SERPL-MCNC: 9.4 MG/DL (ref 8.4–10.2)
CHLORIDE SERPL-SCNC: 101 MMOL/L (ref 98–107)
CO2 SERPL-SCNC: 24 MMOL/L (ref 23–31)
CREAT SERPL-MCNC: 1.09 MG/DL (ref 0.55–1.02)
CREAT/UREA NIT SERPL: 16
EOSINOPHIL # BLD AUTO: 0.22 X10(3)/MCL (ref 0–0.9)
EOSINOPHIL NFR BLD AUTO: 4.1 %
ERYTHROCYTE [DISTWIDTH] IN BLOOD BY AUTOMATED COUNT: 13 % (ref 11.5–17)
GFR SERPLBLD CREATININE-BSD FMLA CKD-EPI: 54 ML/MIN/1.73/M2
GLOBULIN SER-MCNC: 3.5 GM/DL (ref 2.4–3.5)
GLUCOSE SERPL-MCNC: 101 MG/DL (ref 82–115)
HCT VFR BLD AUTO: 34.1 % (ref 37–47)
HGB BLD-MCNC: 11.2 G/DL (ref 12–16)
IMM GRANULOCYTES # BLD AUTO: 0.02 X10(3)/MCL (ref 0–0.04)
IMM GRANULOCYTES NFR BLD AUTO: 0.4 %
LYMPHOCYTES # BLD AUTO: 0.95 X10(3)/MCL (ref 0.6–4.6)
LYMPHOCYTES NFR BLD AUTO: 17.8 %
MCH RBC QN AUTO: 31 PG (ref 27–31)
MCHC RBC AUTO-ENTMCNC: 32.8 G/DL (ref 33–36)
MCV RBC AUTO: 94.5 FL (ref 80–94)
MONOCYTES # BLD AUTO: 0.61 X10(3)/MCL (ref 0.1–1.3)
MONOCYTES NFR BLD AUTO: 11.4 %
NEUTROPHILS # BLD AUTO: 3.51 X10(3)/MCL (ref 2.1–9.2)
NEUTROPHILS NFR BLD AUTO: 65.7 %
NRBC BLD AUTO-RTO: 0 %
PLATELET # BLD AUTO: 288 X10(3)/MCL (ref 130–400)
PMV BLD AUTO: 9.7 FL (ref 7.4–10.4)
POTASSIUM SERPL-SCNC: 3.8 MMOL/L (ref 3.5–5.1)
PROT SERPL-MCNC: 7.3 GM/DL (ref 5.8–7.6)
RBC # BLD AUTO: 3.61 X10(6)/MCL (ref 4.2–5.4)
SODIUM SERPL-SCNC: 135 MMOL/L (ref 136–145)
WBC # BLD AUTO: 5.34 X10(3)/MCL (ref 4.5–11.5)

## 2024-10-16 PROCEDURE — 36415 COLL VENOUS BLD VENIPUNCTURE: CPT

## 2024-10-16 PROCEDURE — 80053 COMPREHEN METABOLIC PANEL: CPT

## 2024-10-16 PROCEDURE — 85025 COMPLETE CBC W/AUTO DIFF WBC: CPT

## 2024-10-29 ENCOUNTER — OFFICE VISIT (OUTPATIENT)
Dept: INFECTIOUS DISEASES | Facility: CLINIC | Age: 69
End: 2024-10-29
Payer: MEDICARE

## 2024-10-29 VITALS
RESPIRATION RATE: 18 BRPM | SYSTOLIC BLOOD PRESSURE: 135 MMHG | OXYGEN SATURATION: 97 % | WEIGHT: 197.06 LBS | HEIGHT: 65 IN | TEMPERATURE: 99 F | BODY MASS INDEX: 32.83 KG/M2 | HEART RATE: 74 BPM | DIASTOLIC BLOOD PRESSURE: 75 MMHG

## 2024-10-29 DIAGNOSIS — A31.0 MYCOBACTERIUM AVIUM COMPLEX: ICD-10-CM

## 2024-10-29 DIAGNOSIS — N18.9 CHRONIC KIDNEY DISEASE, UNSPECIFIED CKD STAGE: ICD-10-CM

## 2024-10-29 DIAGNOSIS — A31.0 PULMONARY MYCOBACTERIUM AVIUM COMPLEX (MAC) INFECTION: Primary | ICD-10-CM

## 2024-10-29 PROCEDURE — 99999 PR PBB SHADOW E&M-EST. PATIENT-LVL III: CPT | Mod: PBBFAC,,, | Performed by: GENERAL PRACTICE

## 2024-10-29 PROCEDURE — 99213 OFFICE O/P EST LOW 20 MIN: CPT | Mod: PBBFAC | Performed by: GENERAL PRACTICE

## 2024-10-29 PROCEDURE — 99214 OFFICE O/P EST MOD 30 MIN: CPT | Mod: S$PBB,,, | Performed by: GENERAL PRACTICE

## 2024-10-29 RX ORDER — AMIKACIN 590 MG/8.4ML
1 SUSPENSION RESPIRATORY (INHALATION) DAILY
Qty: 30 EACH | Refills: 5 | Status: SHIPPED | OUTPATIENT
Start: 2024-10-29

## 2024-11-13 ENCOUNTER — HOSPITAL ENCOUNTER (OUTPATIENT)
Dept: RADIOLOGY | Facility: HOSPITAL | Age: 69
Discharge: HOME | End: 2024-11-13
Attending: GENERAL PRACTICE
Payer: MEDICARE

## 2024-11-13 DIAGNOSIS — A31.0 MYCOBACTERIUM AVIUM COMPLEX: ICD-10-CM

## 2024-11-13 PROCEDURE — 71250 CT THORAX DX C-: CPT | Mod: TC

## 2024-11-14 DIAGNOSIS — J47.9 BRONCHIECTASIS WITHOUT COMPLICATION: ICD-10-CM

## 2024-11-14 DIAGNOSIS — A31.0 MYCOBACTERIUM AVIUM COMPLEX: ICD-10-CM

## 2024-11-14 RX ORDER — ALBUTEROL SULFATE 0.83 MG/ML
2.5 SOLUTION RESPIRATORY (INHALATION) EVERY 6 HOURS PRN
Qty: 112 EACH | Refills: 2 | Status: SHIPPED | OUTPATIENT
Start: 2024-11-14 | End: 2025-02-12

## 2024-11-18 ENCOUNTER — TELEPHONE (OUTPATIENT)
Dept: INFECTIOUS DISEASES | Facility: CLINIC | Age: 69
End: 2024-11-18
Payer: MEDICARE

## 2024-11-18 NOTE — TELEPHONE ENCOUNTER
Patient notified that Dr. Stroud did review CT and will give her a call this week .   ----- Message from Ismael Stroud MD sent at 11/18/2024  4:12 PM CST -----  Please inform the patient that I have seen the CT and will contact her about it this week. No major changes to be done right now however. Thank you  ----- Message -----  From: Yoselin Plascencia LPN  Sent: 11/18/2024   3:07 PM CST  To: Ismael Stroud MD    Patient calling again to ask if you can review her CT scan because she noticed a few changes and let her know if she needs to take any further steps.

## 2024-11-21 DIAGNOSIS — A31.0 MYCOBACTERIUM AVIUM COMPLEX: ICD-10-CM

## 2024-11-21 RX ORDER — AMIKACIN 590 MG/8.4ML
1 SUSPENSION RESPIRATORY (INHALATION) DAILY
Qty: 30 EACH | Refills: 5 | Status: SHIPPED | OUTPATIENT
Start: 2024-11-21

## 2024-11-25 ENCOUNTER — TELEPHONE (OUTPATIENT)
Dept: INFECTIOUS DISEASES | Facility: CLINIC | Age: 69
End: 2024-11-25
Payer: MEDICARE

## 2024-11-25 NOTE — TELEPHONE ENCOUNTER
Called the patient back regarding her CT scan results we do see some increased nodularity and inflammation on the CT scan however her cultures been negative, and the patient reports that she has been able to be much more active than prior, she has able to do housework, she pain to the living room in the past weekend, overall her activity level is improved, she continues to have an intermittent cough however.  I do suspect that possibly the CT scan show of findings could be related to some inflammation related to the use of Arikayce.  I discussed with the patient potential options including holding on the medication and repeating imaging versus continuing now given that symptoms have been improving overall and the patient is not having signs concerning of worsening pulmonary injury.  The patient will be followed up with Dr. Ibarra at [facility] infectious disease, have requested a sooner appointment with them and discuss this with the patient.    Ismael Stroud MD  Infectious Disease  [facility]

## 2024-11-26 ENCOUNTER — TELEPHONE (OUTPATIENT)
Dept: INFECTIOUS DISEASES | Facility: CLINIC | Age: 69
End: 2024-11-26
Payer: MEDICARE

## 2024-11-26 NOTE — TELEPHONE ENCOUNTER
----- Message from Roc Ibarra MD sent at 11/25/2024  3:42 PM CST -----  Please schedule patient for follow up with myself sooner if able to do so. She is transferring care from Dr. Stroud's clinic    Roc Ibarra MD  Infectious Diseases Faculty   of Medicine

## 2024-12-17 ENCOUNTER — OFFICE VISIT (OUTPATIENT)
Dept: INFECTIOUS DISEASES | Facility: CLINIC | Age: 69
End: 2024-12-17
Payer: MEDICARE

## 2024-12-17 VITALS
SYSTOLIC BLOOD PRESSURE: 131 MMHG | BODY MASS INDEX: 32.18 KG/M2 | HEIGHT: 65 IN | TEMPERATURE: 98 F | RESPIRATION RATE: 16 BRPM | DIASTOLIC BLOOD PRESSURE: 70 MMHG | WEIGHT: 193.13 LBS | HEART RATE: 100 BPM

## 2024-12-17 DIAGNOSIS — R05.3 CHRONIC COUGH: ICD-10-CM

## 2024-12-17 DIAGNOSIS — A31.0 MAI (MYCOBACTERIUM AVIUM-INTRACELLULARE): ICD-10-CM

## 2024-12-17 DIAGNOSIS — A31.0 MYCOBACTERIUM AVIUM COMPLEX: Primary | ICD-10-CM

## 2024-12-17 DIAGNOSIS — A31.0 PULMONARY MYCOBACTERIUM AVIUM COMPLEX (MAC) INFECTION: ICD-10-CM

## 2024-12-17 LAB
OHS QRS DURATION: 76 MS
OHS QTC CALCULATION: 420 MS

## 2024-12-17 PROCEDURE — 99215 OFFICE O/P EST HI 40 MIN: CPT | Mod: PBBFAC,25

## 2024-12-17 PROCEDURE — 93005 ELECTROCARDIOGRAM TRACING: CPT

## 2024-12-17 RX ORDER — AZITHROMYCIN 500 MG/1
500 TABLET, FILM COATED ORAL DAILY
Qty: 90 TABLET | Refills: 1 | Status: SHIPPED | OUTPATIENT
Start: 2024-12-17 | End: 2025-06-15

## 2024-12-17 RX ORDER — RIFAMPIN 300 MG/1
600 CAPSULE ORAL DAILY
Qty: 180 CAPSULE | Refills: 1 | Status: SHIPPED | OUTPATIENT
Start: 2024-12-17 | End: 2025-06-15

## 2024-12-17 RX ORDER — METOPROLOL SUCCINATE 25 MG/1
25 TABLET, EXTENDED RELEASE ORAL DAILY
COMMUNITY
Start: 2024-12-13

## 2024-12-17 RX ORDER — ETHAMBUTOL HYDROCHLORIDE 400 MG/1
1200 TABLET, FILM COATED ORAL DAILY
Qty: 90 TABLET | Refills: 5 | Status: SHIPPED | OUTPATIENT
Start: 2024-12-17 | End: 2025-06-15

## 2024-12-17 RX ORDER — FERROUS SULFATE 325(65) MG
325 TABLET ORAL DAILY
COMMUNITY

## 2024-12-17 RX ORDER — CHOLECALCIFEROL (VITAMIN D3) 25 MCG
1000 TABLET ORAL DAILY
COMMUNITY

## 2024-12-17 RX ORDER — ASPIRIN 325 MG
TABLET, DELAYED RELEASE (ENTERIC COATED) ORAL DAILY
COMMUNITY

## 2024-12-17 RX ORDER — AMIKACIN 590 MG/8.4ML
1 SUSPENSION RESPIRATORY (INHALATION) DAILY
Qty: 30 EACH | Refills: 5 | Status: SHIPPED | OUTPATIENT
Start: 2024-12-17

## 2024-12-17 NOTE — PROGRESS NOTES
[facility] Outpatient INFECTIOUS DISEASES Clinic Note    CHIEF COMPLAINT: Pulmonary MAC    HISTORY OF PRESENT ILLNESS:     Ms. Carmen Mckinley, 69YOF with PMHx of COPD (18 pack year history), known history of MAC. Was initiated on Rifampin, Ethambutol, and Azithromycin intermittent dosing, but was transitioned to daily dosing after having persistently positive cultures. At some point was started on IV amikacin, but developed toxicity and was stopped on Amikacin around 10/20/2022, with improvement in symptoms. CKD had stabilized around 2023, and Ototoxicity had improved following stopping amikacin.  Was continuing on rifampin 600 mg qd, azithromycin 500 mg qd, and ethambutol 1200 mg, and Arikayce was added. On 2024. Reports cough in the afternoon after the Arikayce dosing, but other wise tolerating it well. No other acute complaints other wise.     REVIEW OF SYSTEMS:  Review of Systems   Constitutional:  Negative for chills and fever.   Respiratory:  Positive for cough. Negative for sputum production and shortness of breath.    Cardiovascular:  Negative for chest pain and palpitations.   Gastrointestinal:  Negative for abdominal pain, diarrhea, nausea and vomiting.       PREVIOUS MEDICAL HISTORY:  Past Medical History:   Diagnosis Date    Anxiety     Arthritis     Depression     GERD (gastroesophageal reflux disease)     Hyperlipidemia     Hypertension        PREVIOUS SURGICAL HISTORY:  Past Surgical History:   Procedure Laterality Date    APPENDECTOMY       SECTION      ECTOPIC PREGNANCY SURGERY      FRACTURE SURGERY  2021    Wrist    FUNCTIONAL ENDOSCOPIC SINUS SURGERY (FESS) Bilateral     HERNIA REPAIR  1970s    MANDIBLE SURGERY Bilateral     PERIPHERALLY INSERTED CENTRAL CATHETER INSERTION N/A 2022    Procedure: INSERTION, PICC;  Surgeon: Ismael Stroud MD;  Location: [facility];  Service: Infectious Disease;  Laterality: N/A;    TUBAL LIGATION  1989    WRIST FRACTURE SURGERY            ALLERGIES:  Review of patient's allergies indicates:  No Known Allergies      MEDICATIONS:  Current Outpatient Medications on File Prior to Visit   Medication Sig Dispense Refill    albuterol (PROVENTIL) 2.5 mg /3 mL (0.083 %) nebulizer solution Take 3 mLs (2.5 mg total) by nebulization every 6 (six) hours as needed for Wheezing or Shortness of Breath. Rescue 112 each 2    busPIRone (BUSPAR) 5 MG Tab Take 5 mg by mouth 2 (two) times daily as needed.      fenofibrate (TRICOR) 145 MG tablet Take 145 mg by mouth.      ferrous sulfate (FEOSOL) 325 mg (65 mg iron) Tab tablet Take 325 mg by mouth once daily.      fish oil-dha-epa 1,200-144-216 mg Cap Take by mouth.      glucosamine-chondroitin 500-400 mg tablet Take 1 tablet by mouth once daily.      melatonin 10 mg Tab Take by mouth.      metoprolol succinate (TOPROL-XL) 25 MG 24 hr tablet Take 25 mg by mouth once daily.      nebulizer and compressor (PORTABLE NEBULIZER SYSTEM) Tiana Please dispense 1 complete nebulizer machine and accessories 1 each 0    omega 3-dha-epa-fish oil (FISH OIL) 60- mg Cap once daily.      PULMO-AIDE COMPRESSOR Tiana use as directed      sertraline (ZOLOFT) 100 MG tablet Take 100 mg by mouth once daily.      STERILE SALINE 0.9 % irrigation Irrigate with 900 mLs as directed once.      vitamin D (VITAMIN D3) 1000 units Tab Take 1,000 Units by mouth once daily.      [DISCONTINUED] amikacin liposomal-neb.accessr (ARIKAYCE) 590 mg/8.4 mL NbSp Inhale 1 vial into the lungs once daily. 30 each 5    [DISCONTINUED] azithromycin (ZITHROMAX) 500 MG tablet Take 1 tablet (500 mg total) by mouth once daily. 90 tablet 1    [DISCONTINUED] ethambutoL (MYAMBUTOL) 400 MG Tab Take 3 tablets (1,200 mg total) by mouth once daily. 90 tablet 5    [DISCONTINUED] rifAMpin (RIFADIN) 300 MG capsule Take 2 capsules (600 mg total) by mouth once daily. 180 capsule 1    calcium carbonate (CALCIUM 500 ORAL) Take 500 mg by mouth Daily.      [DISCONTINUED]  "propranoloL (INDERAL) 80 MG tablet Take 80 mg by mouth once daily.       No current facility-administered medications on file prior to visit.          SOCIAL HISTORY:    reports that she has quit smoking. Her smoking use included cigarettes. She has a 18 pack-year smoking history. She has never used smokeless tobacco. She reports that she does not currently use alcohol. She reports that she does not currently use drugs.      FAMILY HISTORY:   Family History   Problem Relation Name Age of Onset    Asbestos Mother Jonna Agosto     Cancer Mother Jonna Agosto         Mesothelioma    Suicide Father Abhishek Agosto     Depression Father Abhishek Agosto         Suicide    Depression Brother Evgeny Agosto         Suicide       PHYSICAL EXAMINATION:  /70 (BP Location: Left arm, Patient Position: Sitting)   Pulse 100   Temp 97.9 °F (36.6 °C) (Oral)   Resp 16   Ht 5' 5" (1.651 m)   Wt 87.6 kg (193 lb 2 oz)   BMI 32.14 kg/m²  Body mass index is 32.14 kg/m².    Gen: awake, alert, NAD  HEENT: NC/AT, EOMi, anicteric sclera, no rhinorrhea, OP clear  Neck: no cervical LAD  CV: regular rate normal rhythm no murmur  Resp: easy WOB on RA CTABL no w/r/r  Abd: +BS, soft, NTTP, no HSM  Ext: warm, no LE edema  Skin: no rash  Neuro: CN 2-12 grossly intact, UE and LE anti-gravity, no focal deficits       LABORATORY DATA:  Lab Results   Component Value Date    WBC 5.02 11/13/2024    WBC 5.34 10/16/2024    WBC 5.62 10/02/2024    HGB 11.1 (L) 11/13/2024    HGB 11.2 (L) 10/16/2024    HGB 11.1 (L) 10/02/2024     11/13/2024     10/16/2024     10/02/2024    CREATININE 1.37 (H) 11/13/2024    CREATININE 1.09 (H) 10/16/2024    CREATININE 1.10 (H) 10/02/2024    ALT 8 11/13/2024    ALT 10 10/16/2024    ALT 12 10/02/2024    AST 17 11/13/2024    AST 17 10/16/2024    AST 18 10/02/2024    ALKPHOS 28 (L) 11/13/2024    ALKPHOS 26 (L) 10/16/2024    ALKPHOS 25 (L) 10/02/2024    BILITOT 0.5 11/13/2024    BILITOT 0.5 " 10/16/2024    BILITOT 0.6 10/02/2024       MICROBIOLOGY DATA:  9/6/2024 - Mycobacteria/Nocardia culture negative  9/5/2024 - Mycobacteria/Nocardia culture negative  8/22/2024 - Mycobacteria/Nocardia culture negative  2/8/2024 - Mycobacterial/Nocardia culture positive    IMAGING DATA:  11/13/2024 CT Chest - Interval development of multiple scattered < 1 cm nodules bilaterally, recommended follow up in 3 months     ASSESSMENT:  Pulmonary MAC  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 Stroud, has been on Azithromycin 500 mg qd, Rifampin 600 mg qd, Ethambutol 1200 mg qd, and Arikayce 590 mg nebulized qd.  -Currently tolerating medications, and has been following with ophthalmologist (last seen 2 months ago), and sees audiology weekly.   -Will obtain EKG today and repeat CBC/CMP. Will obtain CBC/CMP monthly.       RTC 2 months. Labs and EKG today, and every month following.      Ravi Mccabe DO  [facility], PGY-III

## 2024-12-18 ENCOUNTER — LAB VISIT (OUTPATIENT)
Dept: LAB | Facility: HOSPITAL | Age: 69
End: 2024-12-18
Payer: MEDICARE

## 2024-12-18 DIAGNOSIS — A31.0 MYCOBACTERIUM AVIUM COMPLEX: ICD-10-CM

## 2024-12-18 LAB
ALBUMIN SERPL-MCNC: 3.8 G/DL (ref 3.4–4.8)
ALBUMIN/GLOB SERPL: 1.1 RATIO (ref 1.1–2)
ALP SERPL-CCNC: 33 UNIT/L (ref 40–150)
ALT SERPL-CCNC: 10 UNIT/L (ref 0–55)
ANION GAP SERPL CALC-SCNC: 7 MEQ/L
AST SERPL-CCNC: 18 UNIT/L (ref 5–34)
BASOPHILS # BLD AUTO: 0.03 X10(3)/MCL
BASOPHILS NFR BLD AUTO: 0.6 %
BILIRUB SERPL-MCNC: 0.5 MG/DL
BUN SERPL-MCNC: 19.7 MG/DL (ref 9.8–20.1)
CALCIUM SERPL-MCNC: 9.5 MG/DL (ref 8.4–10.2)
CHLORIDE SERPL-SCNC: 102 MMOL/L (ref 98–107)
CO2 SERPL-SCNC: 29 MMOL/L (ref 23–31)
CREAT SERPL-MCNC: 1.17 MG/DL (ref 0.55–1.02)
CREAT/UREA NIT SERPL: 17
EOSINOPHIL # BLD AUTO: 0.22 X10(3)/MCL (ref 0–0.9)
EOSINOPHIL NFR BLD AUTO: 4.4 %
ERYTHROCYTE [DISTWIDTH] IN BLOOD BY AUTOMATED COUNT: 12.7 % (ref 11.5–17)
GFR SERPLBLD CREATININE-BSD FMLA CKD-EPI: 50 ML/MIN/1.73/M2
GLOBULIN SER-MCNC: 3.6 GM/DL (ref 2.4–3.5)
GLUCOSE SERPL-MCNC: 85 MG/DL (ref 82–115)
HCT VFR BLD AUTO: 34.3 % (ref 37–47)
HGB BLD-MCNC: 11.5 G/DL (ref 12–16)
IMM GRANULOCYTES # BLD AUTO: 0.01 X10(3)/MCL (ref 0–0.04)
IMM GRANULOCYTES NFR BLD AUTO: 0.2 %
LYMPHOCYTES # BLD AUTO: 0.89 X10(3)/MCL (ref 0.6–4.6)
LYMPHOCYTES NFR BLD AUTO: 18 %
MCH RBC QN AUTO: 31.7 PG (ref 27–31)
MCHC RBC AUTO-ENTMCNC: 33.5 G/DL (ref 33–36)
MCV RBC AUTO: 94.5 FL (ref 80–94)
MONOCYTES # BLD AUTO: 0.48 X10(3)/MCL (ref 0.1–1.3)
MONOCYTES NFR BLD AUTO: 9.7 %
NEUTROPHILS # BLD AUTO: 3.32 X10(3)/MCL (ref 2.1–9.2)
NEUTROPHILS NFR BLD AUTO: 67.1 %
NRBC BLD AUTO-RTO: 0 %
PLATELET # BLD AUTO: 261 X10(3)/MCL (ref 130–400)
PMV BLD AUTO: 9.9 FL (ref 7.4–10.4)
POTASSIUM SERPL-SCNC: 4 MMOL/L (ref 3.5–5.1)
PROT SERPL-MCNC: 7.4 GM/DL (ref 5.8–7.6)
RBC # BLD AUTO: 3.63 X10(6)/MCL (ref 4.2–5.4)
SODIUM SERPL-SCNC: 138 MMOL/L (ref 136–145)
WBC # BLD AUTO: 4.95 X10(3)/MCL (ref 4.5–11.5)

## 2024-12-18 PROCEDURE — 36415 COLL VENOUS BLD VENIPUNCTURE: CPT

## 2024-12-18 PROCEDURE — 85025 COMPLETE CBC W/AUTO DIFF WBC: CPT

## 2024-12-18 PROCEDURE — 80053 COMPREHEN METABOLIC PANEL: CPT

## 2024-12-20 ENCOUNTER — PATIENT MESSAGE (OUTPATIENT)
Dept: INFECTIOUS DISEASES | Facility: HOSPITAL | Age: 69
End: 2024-12-20
Payer: MEDICARE

## 2025-01-16 ENCOUNTER — LAB VISIT (OUTPATIENT)
Dept: LAB | Facility: HOSPITAL | Age: 70
End: 2025-01-16
Payer: MEDICARE

## 2025-01-16 DIAGNOSIS — A31.0 MYCOBACTERIUM AVIUM COMPLEX: ICD-10-CM

## 2025-01-16 LAB
ALBUMIN SERPL-MCNC: 3.9 G/DL (ref 3.4–4.8)
ALBUMIN/GLOB SERPL: 1.2 RATIO (ref 1.1–2)
ALP SERPL-CCNC: 32 UNIT/L (ref 40–150)
ALT SERPL-CCNC: 11 UNIT/L (ref 0–55)
ANION GAP SERPL CALC-SCNC: 6 MEQ/L
AST SERPL-CCNC: 17 UNIT/L (ref 5–34)
BASOPHILS # BLD AUTO: 0.03 X10(3)/MCL
BASOPHILS NFR BLD AUTO: 0.6 %
BILIRUB SERPL-MCNC: 0.5 MG/DL
BUN SERPL-MCNC: 17.5 MG/DL (ref 9.8–20.1)
CALCIUM SERPL-MCNC: 9.2 MG/DL (ref 8.4–10.2)
CHLORIDE SERPL-SCNC: 101 MMOL/L (ref 98–107)
CO2 SERPL-SCNC: 26 MMOL/L (ref 23–31)
CREAT SERPL-MCNC: 1.1 MG/DL (ref 0.55–1.02)
CREAT/UREA NIT SERPL: 16
EOSINOPHIL # BLD AUTO: 0.19 X10(3)/MCL (ref 0–0.9)
EOSINOPHIL NFR BLD AUTO: 3.8 %
ERYTHROCYTE [DISTWIDTH] IN BLOOD BY AUTOMATED COUNT: 12.7 % (ref 11.5–17)
GFR SERPLBLD CREATININE-BSD FMLA CKD-EPI: 54 ML/MIN/1.73/M2
GLOBULIN SER-MCNC: 3.2 GM/DL (ref 2.4–3.5)
GLUCOSE SERPL-MCNC: 82 MG/DL (ref 82–115)
HCT VFR BLD AUTO: 35.5 % (ref 37–47)
HGB BLD-MCNC: 11.6 G/DL (ref 12–16)
IMM GRANULOCYTES # BLD AUTO: 0.02 X10(3)/MCL (ref 0–0.04)
IMM GRANULOCYTES NFR BLD AUTO: 0.4 %
LYMPHOCYTES # BLD AUTO: 1.13 X10(3)/MCL (ref 0.6–4.6)
LYMPHOCYTES NFR BLD AUTO: 22.4 %
MCH RBC QN AUTO: 31.1 PG (ref 27–31)
MCHC RBC AUTO-ENTMCNC: 32.7 G/DL (ref 33–36)
MCV RBC AUTO: 95.2 FL (ref 80–94)
MONOCYTES # BLD AUTO: 0.51 X10(3)/MCL (ref 0.1–1.3)
MONOCYTES NFR BLD AUTO: 10.1 %
NEUTROPHILS # BLD AUTO: 3.17 X10(3)/MCL (ref 2.1–9.2)
NEUTROPHILS NFR BLD AUTO: 62.7 %
NRBC BLD AUTO-RTO: 0 %
PLATELET # BLD AUTO: 282 X10(3)/MCL (ref 130–400)
PMV BLD AUTO: 10 FL (ref 7.4–10.4)
POTASSIUM SERPL-SCNC: 4.1 MMOL/L (ref 3.5–5.1)
PROT SERPL-MCNC: 7.1 GM/DL (ref 5.8–7.6)
RBC # BLD AUTO: 3.73 X10(6)/MCL (ref 4.2–5.4)
SODIUM SERPL-SCNC: 133 MMOL/L (ref 136–145)
WBC # BLD AUTO: 5.05 X10(3)/MCL (ref 4.5–11.5)

## 2025-01-16 PROCEDURE — 36415 COLL VENOUS BLD VENIPUNCTURE: CPT

## 2025-01-16 PROCEDURE — 85025 COMPLETE CBC W/AUTO DIFF WBC: CPT

## 2025-01-16 PROCEDURE — 80053 COMPREHEN METABOLIC PANEL: CPT

## 2025-01-17 ENCOUNTER — PATIENT MESSAGE (OUTPATIENT)
Dept: INFECTIOUS DISEASES | Facility: HOSPITAL | Age: 70
End: 2025-01-17
Payer: MEDICARE

## 2025-01-27 DIAGNOSIS — A31.0 MYCOBACTERIUM AVIUM COMPLEX: ICD-10-CM

## 2025-01-27 NOTE — TELEPHONE ENCOUNTER
----- Message from Rah sent at 1/27/2025  2:24 PM CST -----  Patient of Fred    Patient wants to speak with a nurse regarding medication refill issue.      2:25  Thanks,

## 2025-01-28 RX ORDER — AMIKACIN 590 MG/8.4ML
1 SUSPENSION RESPIRATORY (INHALATION) DAILY
Qty: 30 EACH | Refills: 5 | Status: SHIPPED | OUTPATIENT
Start: 2025-01-28

## 2025-01-28 NOTE — TELEPHONE ENCOUNTER
Spoke to Carmen, [facility] pharmacy is telling her she needs a new auth for her Arikayce nebulizer.   She has a new Medicaid D and will email me a copy.   Instructed her we will need to get a denial from [facility], will ask Dr Ibarra to send a refill and once I get an answer will call her back.

## 2025-01-29 ENCOUNTER — TELEPHONE (OUTPATIENT)
Dept: INFECTIOUS DISEASES | Facility: CLINIC | Age: 70
End: 2025-01-29
Payer: MEDICARE

## 2025-01-29 NOTE — TELEPHONE ENCOUNTER
Spoke to Carmen, let her know I did not receive her email with new insurance card.  Also, that Dr. Ibarra sent new script to her pharmacy and the pharmacy has not responded back that an auth was needed.

## 2025-01-29 NOTE — TELEPHONE ENCOUNTER
----- Message from Diandra sent at 1/29/2025  9:34 AM CST -----  Patient of Dr Fred Jim    Patient would like to know if you received information that was emailed yesterday    Please advise    [id number]

## 2025-01-30 ENCOUNTER — TELEPHONE (OUTPATIENT)
Dept: INFECTIOUS DISEASES | Facility: CLINIC | Age: 70
End: 2025-01-30
Payer: MEDICARE

## 2025-01-30 NOTE — TELEPHONE ENCOUNTER
----- Message from Diandra sent at 1/30/2025 12:22 PM CST -----  Patient of Dr Fred Jim     Patient left voicemail requesting call back from Dr Ibarra's nurse    Please contact [phone]    12:23p    Thanks

## 2025-01-30 NOTE — TELEPHONE ENCOUNTER
Spoke to Carmen, she tells me after she called [facility] they told her the script was on hold due to waiting on PA from our office. I did let the pt know [facility] has not contacted us, but I will call Parvin with [employer].    Left Parvin a message to call me back

## 2025-01-30 NOTE — TELEPHONE ENCOUNTER
Spoke to Parvin he will start the auth process, he asked that our office filled out a new Arikayce enrollment form

## 2025-02-05 NOTE — TELEPHONE ENCOUNTER
Pt sent me an email that pharmacy reached out to her, med is on hold waiting on auth. Emailed pt back that I would check with Parvin Baptiste Coordinator.    Let Parvin a message to call me back with an update.

## 2025-02-06 NOTE — TELEPHONE ENCOUNTER
Attempted to call Parvin unable to speak to him.    Unable to complete PA on covermymeds due to case already open my Arikayce support team.

## 2025-02-07 ENCOUNTER — TELEPHONE (OUTPATIENT)
Dept: INFECTIOUS DISEASES | Facility: CLINIC | Age: 70
End: 2025-02-07
Payer: MEDICARE

## 2025-02-07 NOTE — TELEPHONE ENCOUNTER
----- Message from Radha sent at 2/7/2025 10:46 AM CST -----  Pt of Dr.Wexler Nguyen with [facility] left a voice message concerning the denial PA for pt medication  amikacin liposomal-neb.accessr (ARIKAYCE) 590 mg/8.4 mL NbSp.      Call back number [phone]      Please advise

## 2025-02-07 NOTE — TELEPHONE ENCOUNTER
Called [facility] back, they used Dr Stroud rx even thought we sent one under Dr. Ibarra, that seems to be the reason I can not complete on cover my meds and why the med was denied. I was told to call [facility] at [phone].

## 2025-02-07 NOTE — LETTER
February 11, 2025        Carmen Mckinley  [address] [address]             [facility]  Count includes the [facility] [facility] [address]  Phone: [phone]   Patient: Carmen Mckinley   MR Number: [id number]   YOB: 1955   Date of Visit: 2/7/2025     To whom it may concern,    I am writing this letter on behalf of Carmen Mckinley who is an established patient of [facility] at [facility] Infectious Diseases clinic. She is currently being treated recurrent pulmonary MAC infection which is refractory to the standard treatment regimen of Azithromycin, Ethambutol, and Rifampin. She was started on Amikacin INH formulation on 6/4/24 and has remained on this medication along with Azithromycin, Ethambutol, and Rifampin. Patient's first negative sputum culture was obtained on 8/22/24 were negative for growth of MAC, thus this is her start date of therapy. Her EOT will be 8/22/25.    Since this infection is recurrent, and she was refractory to the standard triple therapy regimen, she needs a 4th agent added for treatment. She has appropriately cleared her MAC with INH Amikacin, and is tolerating this drug, and therefore her regimen should not be changed. If she were to stop taking Amikacin there is a high chance for her infection to relapse in the future which can be deadly and detrimental to the patient. Please allow the patient to continue her current treatment to avoid serious harm.    Sincerely,    Roc Ibarra MD

## 2025-02-07 NOTE — TELEPHONE ENCOUNTER
"Called [facility] [phone] spoke to Prosper. Was told Arikayce was denied due to "has failed to achieve negative sputum cultures after a minimum of 6 consecutive months of multi-drug regimen" Prosper with [facility] could not tell me who submitted this info to them!    Per pt's chart, she started Arikayce on 6/2024 with Dr Stroud, she had a negative sputum on 8/22/24 this is only 2 months after starting Arikayce and another negative sputum on 9/5/2024.      Per Dr Ibarra's notes, pt started Arikayce neb 6/2024, first negative sputum was 8/22/24. 8/22/24 is the NEW start date and will need treatment for 12 months from 8/22/24, end date will be 8/22/25.      Need Dr Ibarra to explain in letter why pt can't meet [employer] plan for Arikayce ( they have dates wrong)  and why pt needs Arikayce until 8/22/2025.  This is for an appeal.      "

## 2025-02-07 NOTE — TELEPHONE ENCOUNTER
Received Drug Denial from [facility] [employer].  Called and spoke to Parvin with [employer] support, he was not aware of the denial. Faxed Parvin the Denial forms and he will forward to his team and they will do the Appeal. Faxed [phone]  He will also call Carmen to make her aware.

## 2025-02-07 NOTE — TELEPHONE ENCOUNTER
----- Message from Radha sent at 2/7/2025  9:11 AM CST -----  Pt of Dr.Wexler Shanon Angel called stating pt needs a PA for medication amikacin liposomal-neb.accessr (ARIKAYCE) 590 mg/8.4 mL NbSp.    Shanon requesting a call back      Shanon Angel call back number [phone]      Please advise

## 2025-02-11 ENCOUNTER — LAB VISIT (OUTPATIENT)
Dept: LAB | Facility: HOSPITAL | Age: 70
End: 2025-02-11
Attending: INTERNAL MEDICINE
Payer: MEDICARE

## 2025-02-11 DIAGNOSIS — N17.9 AKI (ACUTE KIDNEY INJURY): ICD-10-CM

## 2025-02-11 LAB
ALBUMIN SERPL-MCNC: 3.9 G/DL (ref 3.4–4.8)
ALBUMIN/GLOB SERPL: 1.3 RATIO (ref 1.1–2)
ALP SERPL-CCNC: 29 UNIT/L (ref 40–150)
ALT SERPL-CCNC: 12 UNIT/L (ref 0–55)
ANION GAP SERPL CALC-SCNC: 5 MEQ/L
AST SERPL-CCNC: 17 UNIT/L (ref 5–34)
BACTERIA #/AREA URNS AUTO: ABNORMAL /HPF
BASOPHILS # BLD AUTO: 0.03 X10(3)/MCL
BASOPHILS NFR BLD AUTO: 0.7 %
BILIRUB SERPL-MCNC: 0.5 MG/DL
BILIRUB UR QL STRIP.AUTO: NEGATIVE
BUN SERPL-MCNC: 24.4 MG/DL (ref 9.8–20.1)
CALCIUM SERPL-MCNC: 9.4 MG/DL (ref 8.4–10.2)
CHLORIDE SERPL-SCNC: 103 MMOL/L (ref 98–107)
CLARITY UR: CLEAR
CO2 SERPL-SCNC: 26 MMOL/L (ref 23–31)
COLOR UR AUTO: ABNORMAL
CREAT SERPL-MCNC: 1.13 MG/DL (ref 0.55–1.02)
CREAT UR-MCNC: 13 MG/DL (ref 45–106)
CREAT/UREA NIT SERPL: 22
EOSINOPHIL # BLD AUTO: 0.19 X10(3)/MCL (ref 0–0.9)
EOSINOPHIL NFR BLD AUTO: 4.1 %
ERYTHROCYTE [DISTWIDTH] IN BLOOD BY AUTOMATED COUNT: 12.6 % (ref 11.5–17)
GFR SERPLBLD CREATININE-BSD FMLA CKD-EPI: 52 ML/MIN/1.73/M2
GLOBULIN SER-MCNC: 3.1 GM/DL (ref 2.4–3.5)
GLUCOSE SERPL-MCNC: 81 MG/DL (ref 82–115)
GLUCOSE UR QL STRIP: NORMAL
HCT VFR BLD AUTO: 34.7 % (ref 37–47)
HGB BLD-MCNC: 11.5 G/DL (ref 12–16)
HGB UR QL STRIP: NEGATIVE
IMM GRANULOCYTES # BLD AUTO: 0.02 X10(3)/MCL (ref 0–0.04)
IMM GRANULOCYTES NFR BLD AUTO: 0.4 %
KETONES UR QL STRIP: NEGATIVE
LEUKOCYTE ESTERASE UR QL STRIP: NEGATIVE
LYMPHOCYTES # BLD AUTO: 0.97 X10(3)/MCL (ref 0.6–4.6)
LYMPHOCYTES NFR BLD AUTO: 21.1 %
MCH RBC QN AUTO: 31.1 PG (ref 27–31)
MCHC RBC AUTO-ENTMCNC: 33.1 G/DL (ref 33–36)
MCV RBC AUTO: 93.8 FL (ref 80–94)
MONOCYTES # BLD AUTO: 0.48 X10(3)/MCL (ref 0.1–1.3)
MONOCYTES NFR BLD AUTO: 10.4 %
NEUTROPHILS # BLD AUTO: 2.91 X10(3)/MCL (ref 2.1–9.2)
NEUTROPHILS NFR BLD AUTO: 63.3 %
NITRITE UR QL STRIP: NEGATIVE
NRBC BLD AUTO-RTO: 0 %
PH UR STRIP: 5.5 [PH]
PLATELET # BLD AUTO: 269 X10(3)/MCL (ref 130–400)
PMV BLD AUTO: 9.8 FL (ref 7.4–10.4)
POTASSIUM SERPL-SCNC: 4.6 MMOL/L (ref 3.5–5.1)
PROT SERPL-MCNC: 7 GM/DL (ref 5.8–7.6)
PROT UR QL STRIP: NEGATIVE
PROT UR STRIP-MCNC: <6.8 MG/DL
PTH-INTACT SERPL-MCNC: 55.7 PG/ML (ref 8.7–77)
RBC # BLD AUTO: 3.7 X10(6)/MCL (ref 4.2–5.4)
RBC #/AREA URNS AUTO: ABNORMAL /HPF
SODIUM SERPL-SCNC: 134 MMOL/L (ref 136–145)
SP GR UR STRIP.AUTO: 1 (ref 1–1.03)
SQUAMOUS #/AREA URNS LPF: ABNORMAL /HPF
UROBILINOGEN UR STRIP-ACNC: NORMAL
WBC # BLD AUTO: 4.6 X10(3)/MCL (ref 4.5–11.5)
WBC #/AREA URNS AUTO: ABNORMAL /HPF

## 2025-02-11 PROCEDURE — 82570 ASSAY OF URINE CREATININE: CPT

## 2025-02-11 PROCEDURE — 81001 URINALYSIS AUTO W/SCOPE: CPT

## 2025-02-11 PROCEDURE — 83970 ASSAY OF PARATHORMONE: CPT

## 2025-02-11 PROCEDURE — 85025 COMPLETE CBC W/AUTO DIFF WBC: CPT

## 2025-02-11 PROCEDURE — 36415 COLL VENOUS BLD VENIPUNCTURE: CPT

## 2025-02-11 PROCEDURE — 80053 COMPREHEN METABOLIC PANEL: CPT

## 2025-02-12 NOTE — TELEPHONE ENCOUNTER
[facility] approved Arikayce until 12/31/2025, scanned to chart, emailed Ms Morales a copy of approval and emailed Shanon with [employer] Support a copy of approval.

## 2025-02-12 NOTE — TELEPHONE ENCOUNTER
Called [facility] spoke to Jose, appeal is still in review, should have answer in 24-72 hours from [facility] receiving fax.

## 2025-02-13 ENCOUNTER — OFFICE VISIT (OUTPATIENT)
Dept: NEPHROLOGY | Facility: CLINIC | Age: 70
End: 2025-02-13
Payer: MEDICARE

## 2025-02-13 VITALS
WEIGHT: 196.81 LBS | HEART RATE: 80 BPM | TEMPERATURE: 98 F | BODY MASS INDEX: 32.79 KG/M2 | RESPIRATION RATE: 20 BRPM | OXYGEN SATURATION: 96 % | DIASTOLIC BLOOD PRESSURE: 68 MMHG | HEIGHT: 65 IN | SYSTOLIC BLOOD PRESSURE: 133 MMHG

## 2025-02-13 DIAGNOSIS — A31.0 PULMONARY MYCOBACTERIUM AVIUM COMPLEX (MAC) INFECTION: ICD-10-CM

## 2025-02-13 DIAGNOSIS — N28.1 RENAL CYST: ICD-10-CM

## 2025-02-13 DIAGNOSIS — I10 PRIMARY HYPERTENSION: ICD-10-CM

## 2025-02-13 DIAGNOSIS — N18.31 CHRONIC KIDNEY DISEASE, STAGE 3A: Primary | ICD-10-CM

## 2025-02-13 PROCEDURE — 99215 OFFICE O/P EST HI 40 MIN: CPT | Mod: PBBFAC

## 2025-02-13 PROCEDURE — 99999 PR PBB SHADOW E&M-EST. PATIENT-LVL V: CPT | Mod: PBBFAC,,,

## 2025-02-13 NOTE — PATIENT INSTRUCTIONS
Blood pressure goal: consistently less than 130/85    Avoid NSAIDs and COX2 inhibitors: Advil (ibuprofen), Aleve (naproxen), Mobic (meloxicam), Celebrex (celecoxib), Toradol (ketorolac) and Diclofenac (voltaren), Indomethacin (indocin).    Only take tylenol (acetaminophen) occasionally if needed for aches/pains.    Recommend low sodium diet:  Less than 2,000 mg per day  Avoid high salt foods (olives, pickles, smoked meats, deli meats, salted potato chips, fast food, etc.).   Do not add salt to your food at the table.   Use only small amounts of salt when cooking.      Recommend a healthy lifestyle for weight management:  Light to moderate exercise, increasing as tolerated  Low carbohydrate, low fat diet  Portion control    Avoid alcohol and soda. Limit tea and coffee.     Stay well hydrated with water      Call our office with concerns prior to next appointment    CHRONIC KIDNEY DISEASE BASIC INFORMATION:    Your kidneys do many important jobs. Some of the ways they keep your whole body in balance include:  Removing natural waste products and extra water from your body  Helping make red blood cells  Balancing important minerals in your body  Helping maintain your blood pressure  Keeping your bones healthy    Chronic kidney disease (CKD) is when the kidneys have become damaged over time (for at least 3 months) and have a hard time doing all their important jobs. CKD also increases the risk of other health problems like heart disease and stroke. Developing CKD is usually a very slow process with very few symptoms at first.    Risk Factors  Anyone can develop CKD - at any age. However, some people are at a higher risk than others. The most common CKD risk factors are:  Diabetes  High blood pressure (hypertension)  Heart disease and/or heart failure  Obesity  Over the age of 60  Family history of CKD or kidney failure  Personal history of acute kidney injury (ARNOL)  Smoking and/or use of tobacco products    For many  people, CKD is not caused by just one reason. Instead, it is a result of many physical, environmental, and social factors.      For more education on kidney disease you can visit:  [email or web address]://[email or web address].org/kidney-basics

## 2025-02-13 NOTE — PROGRESS NOTES
[facility] Ambulatory Progress Note      HPI  Carmen Mckinley, 70 y.o. female, presents to office for a follow up visit for CKD 2-3a status post ARNOL from antiviral and antibiotics.  Patient feels fine denies any major complaints.  No changes over the last year except she has had a negative culture for MAC.    Patient denies taking NSAIDs or new antibiotics. Also denies recent episode of dehydration, diarrhea, vomiting, acute illness, hospitalization, recent angiograms or exposure to IV radiocontrast.        Medical Diagnoses:   Past Medical History:   Diagnosis Date    Anxiety     Arthritis     Depression     GERD (gastroesophageal reflux disease)     Hyperlipidemia     Hypertension      Patient Active Problem List   Diagnosis    Pulmonary Mycobacterium avium complex (MAC) infection    Chronic cough    Depressive disorder    Gastroesophageal reflux disease    Hypercholesterolemia    Nocardiosis    Primary hypertension    Pulmonary emphysema    Mycobacterium avium complex    Ototoxicity of both ears    Bronchiectasis without complication    CKD (chronic kidney disease)    Hearing loss    Diarrhea    Chronic kidney disease, stage 3a       Surgical History:   Past Surgical History:   Procedure Laterality Date    APPENDECTOMY       SECTION      ECTOPIC PREGNANCY SURGERY      FRACTURE SURGERY  2021    Wrist    FUNCTIONAL ENDOSCOPIC SINUS SURGERY (FESS) Bilateral     HERNIA REPAIR  1970s    MANDIBLE SURGERY Bilateral     PERIPHERALLY INSERTED CENTRAL CATHETER INSERTION N/A 2022    Procedure: INSERTION, PICC;  Surgeon: Ismael Stroud MD;  Location: [facility];  Service: Infectious Disease;  Laterality: N/A;    TUBAL LIGATION  1989    WRIST FRACTURE SURGERY         Family History:   Family History   Problem Relation Name Age of Onset    Asbestos Mother Jonna Agosto     Cancer Mother Jonna Agosto         Mesothelioma    Suicide Father Abhishek Agosto     Depression Father Abhishek Agosto          Suicide    Depression Brother Evgeny Agosto         Suicide       Social History:   Social History     Tobacco Use    Smoking status: Former     Current packs/day: 1.00     Average packs/day: 1 pack/day for 18.0 years (18.0 ttl pk-yrs)     Types: Cigarettes    Smokeless tobacco: Never    Tobacco comments:     Quit in 1992   Substance Use Topics    Alcohol use: Not Currently     Comment: Rarely drink alcohol       Allergies:  Review of patient's allergies indicates:  No Known Allergies    Medications:    Current Outpatient Medications:     albuterol (PROVENTIL) 2.5 mg /3 mL (0.083 %) nebulizer solution, Take 3 mLs (2.5 mg total) by nebulization every 6 (six) hours as needed for Wheezing or Shortness of Breath. Rescue, Disp: 112 each, Rfl: 2    amikacin liposomal-neb.accessr (ARIKAYCE) 590 mg/8.4 mL NbSp, Inhale 1 vial into the lungs once daily., Disp: 30 each, Rfl: 5    busPIRone (BUSPAR) 5 MG Tab, Take 5 mg by mouth 2 (two) times daily as needed., Disp: , Rfl:     ethambutoL (MYAMBUTOL) 400 MG Tab, Take 3 tablets (1,200 mg total) by mouth once daily., Disp: 90 tablet, Rfl: 5    fenofibrate (TRICOR) 145 MG tablet, Take 145 mg by mouth., Disp: , Rfl:     ferrous sulfate (FEOSOL) 325 mg (65 mg iron) Tab tablet, Take 325 mg by mouth once daily., Disp: , Rfl:     glucosamine-chondroitin 500-400 mg tablet, Take 1 tablet by mouth once daily., Disp: , Rfl:     melatonin 10 mg Tab, Take by mouth., Disp: , Rfl:     metoprolol succinate (TOPROL-XL) 25 MG 24 hr tablet, Take 25 mg by mouth once daily., Disp: , Rfl:     nebulizer and compressor (PORTABLE NEBULIZER SYSTEM) Tiana, Please dispense 1 complete nebulizer machine and accessories, Disp: 1 each, Rfl: 0    omega 3-dha-epa-fish oil (FISH OIL) 60- mg Cap, once daily., Disp: , Rfl:     PULMO-AIDE COMPRESSOR Tiana, use as directed, Disp: , Rfl:     rifAMpin (RIFADIN) 300 MG capsule, Take 2 capsules (600 mg total) by mouth once daily., Disp: 180 capsule, Rfl: 1     "sertraline (ZOLOFT) 100 MG tablet, Take 100 mg by mouth once daily., Disp: , Rfl:     STERILE SALINE 0.9 % irrigation, Irrigate with 900 mLs as directed once., Disp: , Rfl:     vitamin D (VITAMIN D3) 1000 units Tab, Take 1,000 Units by mouth once daily., Disp: , Rfl:     azithromycin (ZITHROMAX) 500 MG tablet, Take 1 tablet (500 mg total) by mouth once daily. (Patient not taking: Reported on 2/13/2025), Disp: 90 tablet, Rfl: 1    fish oil-dha-epa 1,200-144-216 mg Cap, Take by mouth. (Patient not taking: Reported on 2/13/2025), Disp: , Rfl:        Review of Systems:    Constitutional: Denies fever, fatigue, generalized weakness  Skin: Denies wounds, no rashes, no itching, no new skin lesions  Respiratory:  Denies cough, shortness of breath, or wheezing  Cardiovascular: Denies chest pain, palpitations, or swelling  Gastrointestional: Denies abdominal pain, nausea, vomiting, diarrhea, or constipation  Genitourinary: Denies dysuria, hematuria, foamy urine, or incontinence; reports able to empty bladder  Musculoskeletal: Denies back or flank pain  Neurological: Denies headaches, dizziness, paresthesias, tremors or focal weakness      Vital Signs:  /68 (BP Location: Right arm, Patient Position: Sitting)   Pulse 80   Temp 98.3 °F (36.8 °C) (Oral)   Resp 20   Ht 5' 5" (1.651 m)   Wt 89.3 kg (196 lb 12.8 oz)   SpO2 96%   BMI 32.75 kg/m²   Body mass index is 32.75 kg/m².      Physical Exam:    General: no acute distress, awake, alert  Eyes: conjunctiva clear, eyelids without swelling  HENT: atraumatic, oropharynx and nasal mucosa patent  Neck: supple, trache midline, no JVD  Respiratory: equal, unlabored, clear to auscultation A/P  Cardiovascular: RRR without murmur or rub  Edema: none  Gastrointestinal: soft, non-tender, non-distended  Musculoskeletal: ROM without new limitation or discomfort  Integumentary: warm, dry; no rashes, wounds, or skin lesions  Neurological: oriented x4, appropriate, no acute deficits; " no asterixis      Labs:        Component Value Date/Time     (L) 02/11/2025 1346     (L) 01/16/2025 1355    K 4.6 02/11/2025 1346    K 4.1 01/16/2025 1355     02/11/2025 1346     01/16/2025 1355    CO2 26 02/11/2025 1346    CO2 26 01/16/2025 1355    BUN 24.4 (H) 02/11/2025 1346    BUN 17.5 01/16/2025 1355    CREATININE 1.13 (H) 02/11/2025 1346    CREATININE 1.10 (H) 01/16/2025 1355    CREATININE 1.17 (H) 12/18/2024 1354    CREATININE 1.37 (H) 11/13/2024 1239    CALCIUM 9.4 02/11/2025 1346    CALCIUM 9.2 01/16/2025 1355    PHOS 3.6 03/01/2023 1123    PTH 55.7 02/11/2025 1346    PTH 45.8 08/20/2024 1400           Component Value Date/Time    WBC 4.60 02/11/2025 1346    WBC 5.05 01/16/2025 1355    HGB 11.5 (L) 02/11/2025 1346    HGB 11.6 (L) 01/16/2025 1355    HCT 34.7 (L) 02/11/2025 1346    HCT 35.5 (L) 01/16/2025 1355     02/11/2025 1346     01/16/2025 1355       Urine Creatinine   Date Value Ref Range Status   02/11/2025 13.0 (L) 45.0 - 106.0 mg/dL Final   08/20/2024 16.0 (L) 45.0 - 106.0 mg/dL Final       Urine Protein Level   Date Value Ref Range Status   02/11/2025 <6.8 mg/dL Final   08/20/2024 <6.8 mg/dL Final           Imaging:  Retroperitoneal Ultrasound:  Simple Renal cyst 2022    Impression:    1. Chronic kidney disease, stage 3a        2. Primary hypertension        3. Pulmonary Mycobacterium avium complex (MAC) infection          Renal function stable  She has had a negative culture for MAC and we will complete antiviral/antibiotics in August  Blood pressure controlled    Plan:  No change in renal management  Retroperitoneal ultrasound due to renal cyst noted in 2022  Labs and follow up in 1 year    She does get labs completed every month      Daquan RIDER      This note was created with the assistance of [email or web address] voice recognition software or phone dictation. There may be transcription errors as a result of using this technology however minimal. Effort has been  made to assure accuracy of transcription but any obvious errors or omissions should be clarified with the author of the document.

## 2025-02-17 ENCOUNTER — HOSPITAL ENCOUNTER (OUTPATIENT)
Dept: RADIOLOGY | Facility: HOSPITAL | Age: 70
Discharge: HOME | End: 2025-02-17
Payer: MEDICARE

## 2025-02-17 DIAGNOSIS — A31.0 MYCOBACTERIUM AVIUM COMPLEX: ICD-10-CM

## 2025-02-17 DIAGNOSIS — R05.3 CHRONIC COUGH: ICD-10-CM

## 2025-02-17 PROCEDURE — 71250 CT THORAX DX C-: CPT | Mod: TC

## 2025-02-18 ENCOUNTER — PATIENT MESSAGE (OUTPATIENT)
Dept: INFECTIOUS DISEASES | Facility: CLINIC | Age: 70
End: 2025-02-18
Payer: MEDICARE

## 2025-02-18 ENCOUNTER — RESULTS FOLLOW-UP (OUTPATIENT)
Dept: INFECTIOUS DISEASES | Facility: CLINIC | Age: 70
End: 2025-02-18

## 2025-03-11 DIAGNOSIS — A31.0 PULMONARY MYCOBACTERIUM AVIUM COMPLEX (MAC) INFECTION: ICD-10-CM

## 2025-03-11 DIAGNOSIS — A31.0 MYCOBACTERIUM AVIUM COMPLEX: ICD-10-CM

## 2025-03-11 DIAGNOSIS — A31.0 MAI (MYCOBACTERIUM AVIUM-INTRACELLULARE): ICD-10-CM

## 2025-03-11 RX ORDER — AZITHROMYCIN 500 MG/1
500 TABLET, FILM COATED ORAL DAILY
Qty: 90 TABLET | Refills: 1 | Status: SHIPPED | OUTPATIENT
Start: 2025-03-11 | End: 2025-09-07

## 2025-03-11 NOTE — TELEPHONE ENCOUNTER
----- Message from Rah sent at 3/11/2025  2:09 PM CDT -----  Patient of [facility] called requesting a new prescription for azithromycin 90 day supply to [facility] on [address].[phone],

## 2025-03-13 ENCOUNTER — HOSPITAL ENCOUNTER (OUTPATIENT)
Dept: RADIOLOGY | Facility: HOSPITAL | Age: 70
Discharge: HOME | End: 2025-03-13
Payer: MEDICARE

## 2025-03-13 DIAGNOSIS — N28.1 RENAL CYST: ICD-10-CM

## 2025-03-13 PROCEDURE — 76770 US EXAM ABDO BACK WALL COMP: CPT | Mod: TC

## 2025-03-19 ENCOUNTER — LAB VISIT (OUTPATIENT)
Dept: LAB | Facility: HOSPITAL | Age: 70
End: 2025-03-19
Payer: MEDICARE

## 2025-03-19 DIAGNOSIS — A31.0 MYCOBACTERIUM AVIUM COMPLEX: ICD-10-CM

## 2025-03-19 LAB
ALBUMIN SERPL-MCNC: 3.9 G/DL (ref 3.4–4.8)
ALBUMIN/GLOB SERPL: 1.1 RATIO (ref 1.1–2)
ALP SERPL-CCNC: 30 UNIT/L (ref 40–150)
ALT SERPL-CCNC: 11 UNIT/L (ref 0–55)
ANION GAP SERPL CALC-SCNC: 9 MEQ/L
AST SERPL-CCNC: 20 UNIT/L (ref 5–34)
BASOPHILS # BLD AUTO: 0.05 X10(3)/MCL
BASOPHILS NFR BLD AUTO: 1 %
BILIRUB SERPL-MCNC: 0.7 MG/DL
BUN SERPL-MCNC: 16.3 MG/DL (ref 9.8–20.1)
CALCIUM SERPL-MCNC: 9.4 MG/DL (ref 8.4–10.2)
CHLORIDE SERPL-SCNC: 101 MMOL/L (ref 98–107)
CO2 SERPL-SCNC: 24 MMOL/L (ref 23–31)
CREAT SERPL-MCNC: 1.1 MG/DL (ref 0.55–1.02)
CREAT/UREA NIT SERPL: 15
EOSINOPHIL # BLD AUTO: 0.2 X10(3)/MCL (ref 0–0.9)
EOSINOPHIL NFR BLD AUTO: 3.9 %
ERYTHROCYTE [DISTWIDTH] IN BLOOD BY AUTOMATED COUNT: 12.6 % (ref 11.5–17)
GFR SERPLBLD CREATININE-BSD FMLA CKD-EPI: 53 ML/MIN/1.73/M2
GLOBULIN SER-MCNC: 3.4 GM/DL (ref 2.4–3.5)
GLUCOSE SERPL-MCNC: 92 MG/DL (ref 82–115)
HCT VFR BLD AUTO: 33.1 % (ref 37–47)
HGB BLD-MCNC: 11 G/DL (ref 12–16)
IMM GRANULOCYTES # BLD AUTO: 0.02 X10(3)/MCL (ref 0–0.04)
IMM GRANULOCYTES NFR BLD AUTO: 0.4 %
LYMPHOCYTES # BLD AUTO: 1.37 X10(3)/MCL (ref 0.6–4.6)
LYMPHOCYTES NFR BLD AUTO: 26.7 %
MCH RBC QN AUTO: 30.9 PG (ref 27–31)
MCHC RBC AUTO-ENTMCNC: 33.2 G/DL (ref 33–36)
MCV RBC AUTO: 93 FL (ref 80–94)
MONOCYTES # BLD AUTO: 0.52 X10(3)/MCL (ref 0.1–1.3)
MONOCYTES NFR BLD AUTO: 10.1 %
NEUTROPHILS # BLD AUTO: 2.97 X10(3)/MCL (ref 2.1–9.2)
NEUTROPHILS NFR BLD AUTO: 57.9 %
NRBC BLD AUTO-RTO: 0 %
PLATELET # BLD AUTO: 251 X10(3)/MCL (ref 130–400)
PMV BLD AUTO: 9.6 FL (ref 7.4–10.4)
POTASSIUM SERPL-SCNC: 4.2 MMOL/L (ref 3.5–5.1)
PROT SERPL-MCNC: 7.3 GM/DL (ref 5.8–7.6)
RBC # BLD AUTO: 3.56 X10(6)/MCL (ref 4.2–5.4)
SODIUM SERPL-SCNC: 134 MMOL/L (ref 136–145)
WBC # BLD AUTO: 5.13 X10(3)/MCL (ref 4.5–11.5)

## 2025-03-19 PROCEDURE — 85025 COMPLETE CBC W/AUTO DIFF WBC: CPT

## 2025-03-19 PROCEDURE — 36415 COLL VENOUS BLD VENIPUNCTURE: CPT

## 2025-03-19 PROCEDURE — 80053 COMPREHEN METABOLIC PANEL: CPT

## 2025-03-20 ENCOUNTER — OFFICE VISIT (OUTPATIENT)
Dept: INFECTIOUS DISEASES | Facility: CLINIC | Age: 70
End: 2025-03-20
Payer: MEDICARE

## 2025-03-20 VITALS
BODY MASS INDEX: 33.02 KG/M2 | SYSTOLIC BLOOD PRESSURE: 134 MMHG | TEMPERATURE: 99 F | DIASTOLIC BLOOD PRESSURE: 72 MMHG | HEIGHT: 65 IN | RESPIRATION RATE: 16 BRPM | HEART RATE: 74 BPM | WEIGHT: 198.19 LBS

## 2025-03-20 DIAGNOSIS — R05.3 CHRONIC COUGH: ICD-10-CM

## 2025-03-20 DIAGNOSIS — A31.0 PULMONARY MYCOBACTERIUM AVIUM COMPLEX (MAC) INFECTION: Primary | ICD-10-CM

## 2025-03-20 DIAGNOSIS — J47.9 BRONCHIECTASIS WITHOUT COMPLICATION: ICD-10-CM

## 2025-03-20 PROCEDURE — 99215 OFFICE O/P EST HI 40 MIN: CPT | Mod: PBBFAC

## 2025-03-20 NOTE — PROGRESS NOTES
[facility] Outpatient INFECTIOUS DISEASES Clinic Note    CHIEF COMPLAINT: Pulmonary MAC    HISTORY OF PRESENT ILLNESS:     Ms. Carmen Mckinley, 70 YOF with PMHx of COPD (18 pack year history), known history of MAC (since 2017). Was initiated on began treatment for pulmonary MAC with azithromycin monotherapy in 6/2020 which continued until 12/2020 but had positive AFB cultures and reticulonodular disease. Therapy changed to Rifampin, Ethambutol, and Azithromycin intermittent dosing, but was transitioned to daily dosing after having persistently positive cultures in 2021. In 6/2022, IV amikacin was added, but pt developed ototoxicity and nephrotoxicity and was stopped on Amikacin around 10/20/2022, with improvement in symptoms. CKD had stabilized around 6/2023, and Ototoxicity had improved following stopping amikacin.  Was continuing on rifampin 600 mg qd, azithromycin 500 mg qd, and ethambutol 1200 mg till 10/2023. Therapy was stopped as she achieved improvement in CT imaging and clearance of sputums 1 year prior.  She was monitored off therapy, however again became symptomatic in 1/2024 with repeated sputums again positive for MAC, treatment with triple therapy resumed on 3/28/24 and Arikayce was added on 6/4/2024. Last positive sputum culture was in 8/22/24, so start of the therapy date. Repeat CT showed no interval changes in the lung nodules in 02/2025    Today, Reports Coughing once in a while but not debilitating. No other acute complaints other wise. Patient still compliant with the triple therapy and Arikayce. Following up with ophthalmology every three months  and ENT every month. No adverse effects noted so far.      REVIEW OF SYSTEMS:  Review of Systems   Constitutional:  Negative for chills and fever.   Respiratory:  Positive for cough. Negative for sputum production and shortness of breath.    Cardiovascular:  Negative for chest pain and palpitations.   Gastrointestinal:  Negative for abdominal pain, diarrhea,  nausea and vomiting.       PREVIOUS MEDICAL HISTORY:  Past Medical History:   Diagnosis Date    Anxiety     Arthritis     Depression     GERD (gastroesophageal reflux disease)     Hyperlipidemia     Hypertension        PREVIOUS SURGICAL HISTORY:  Past Surgical History:   Procedure Laterality Date    APPENDECTOMY       SECTION      ECTOPIC PREGNANCY SURGERY      FRACTURE SURGERY  2021    Wrist    FUNCTIONAL ENDOSCOPIC SINUS SURGERY (FESS) Bilateral     HERNIA REPAIR  1970s    MANDIBLE SURGERY Bilateral     PERIPHERALLY INSERTED CENTRAL CATHETER INSERTION N/A 2022    Procedure: INSERTION, PICC;  Surgeon: Ismael Stroud MD;  Location: [facility];  Service: Infectious Disease;  Laterality: N/A;    TUBAL LIGATION  1989    WRIST FRACTURE SURGERY           ALLERGIES:  Review of patient's allergies indicates:  No Known Allergies      MEDICATIONS:  Current Outpatient Medications on File Prior to Visit   Medication Sig Dispense Refill    amikacin liposomal-neb.accessr (ARIKAYCE) 590 mg/8.4 mL NbSp Inhale 1 vial into the lungs once daily. 30 each 5    azithromycin (ZITHROMAX) 500 MG tablet Take 1 tablet (500 mg total) by mouth once daily. 90 tablet 1    busPIRone (BUSPAR) 5 MG Tab Take 5 mg by mouth 2 (two) times daily as needed.      ethambutoL (MYAMBUTOL) 400 MG Tab Take 3 tablets (1,200 mg total) by mouth once daily. 90 tablet 5    fenofibrate (TRICOR) 145 MG tablet Take 145 mg by mouth.      ferrous sulfate (FEOSOL) 325 mg (65 mg iron) Tab tablet Take 325 mg by mouth every other day.      fish oil-dha-epa 1,200-144-216 mg Cap Take by mouth.      glucosamine-chondroitin 500-400 mg tablet Take 1 tablet by mouth once daily.      melatonin 10 mg Tab Take by mouth.      metoprolol succinate (TOPROL-XL) 25 MG 24 hr tablet Take 25 mg by mouth once daily.      nebulizer and compressor (PORTABLE NEBULIZER SYSTEM) Tiana Please dispense 1 complete nebulizer machine and accessories 1 each 0    rifAMpin (RIFADIN) 300  "MG capsule Take 2 capsules (600 mg total) by mouth once daily. 180 capsule 1    sertraline (ZOLOFT) 100 MG tablet Take 100 mg by mouth once daily.      vitamin D (VITAMIN D3) 1000 units Tab Take 1,000 Units by mouth once daily.      albuterol (PROVENTIL) 2.5 mg /3 mL (0.083 %) nebulizer solution Take 3 mLs (2.5 mg total) by nebulization every 6 (six) hours as needed for Wheezing or Shortness of Breath. Rescue 112 each 2    omega 3-dha-epa-fish oil (FISH OIL) 60- mg Cap once daily. (Patient not taking: Reported on 3/20/2025)      PULMO-AIDE COMPRESSOR Tiana use as directed (Patient not taking: Reported on 3/20/2025)      STERILE SALINE 0.9 % irrigation Irrigate with 900 mLs as directed once.       No current facility-administered medications on file prior to visit.          SOCIAL HISTORY:    reports that she has quit smoking. Her smoking use included cigarettes. She has a 18 pack-year smoking history. She has never used smokeless tobacco. She reports that she does not currently use alcohol. She reports that she does not currently use drugs.      FAMILY HISTORY:   Family History   Problem Relation Name Age of Onset    Asbestos Mother Jonna Agosto     Cancer Mother Jonna Agosto         Mesothelioma    Suicide Father Abhishek Agosto     Depression Father Abhishek Agosto         Suicide    Depression Brother Evgeny Agosto         Suicide       PHYSICAL EXAMINATION:  /72 (BP Location: Left arm, Patient Position: Sitting)   Pulse 74   Temp 98.6 °F (37 °C) (Oral)   Resp 16   Ht 5' 5" (1.651 m)   Wt 89.9 kg (198 lb 3.1 oz)   BMI 32.98 kg/m²  Body mass index is 32.98 kg/m².    Gen: awake, alert, NAD  HEENT: NC/AT, EOMi, anicteric sclera, no rhinorrhea, OP clear  Neck: no cervical LAD  CV: regular rate normal rhythm no murmur  Resp: easy WOB on RA CTABL no w/r/r  Abd: +BS, soft, NTTP, no HSM  Ext: warm, no LE edema  Skin: no rash  Neuro: CN 2-12 grossly intact, UE and LE anti-gravity, no focal " deficits       LABORATORY DATA:  Lab Results   Component Value Date    WBC 5.13 03/19/2025    WBC 4.60 02/11/2025    WBC 5.05 01/16/2025    HGB 11.0 (L) 03/19/2025    HGB 11.5 (L) 02/11/2025    HGB 11.6 (L) 01/16/2025     03/19/2025     02/11/2025     01/16/2025    CREATININE 1.10 (H) 03/19/2025    CREATININE 1.13 (H) 02/11/2025    CREATININE 1.10 (H) 01/16/2025    ALT 11 03/19/2025    ALT 12 02/11/2025    ALT 11 01/16/2025    AST 20 03/19/2025    AST 17 02/11/2025    AST 17 01/16/2025    ALKPHOS 30 (L) 03/19/2025    ALKPHOS 29 (L) 02/11/2025    ALKPHOS 32 (L) 01/16/2025    BILITOT 0.7 03/19/2025    BILITOT 0.5 02/11/2025    BILITOT 0.5 01/16/2025       MICROBIOLOGY DATA:  9/6/2024 - Mycobacteria/Nocardia culture negative  9/5/2024 - Mycobacteria/Nocardia culture negative  8/22/2024 - Mycobacteria/Nocardia culture negative  2/8/2024 - Mycobacterial/Nocardia culture positive    IMAGING DATA:  11/13/2024 CT Chest - Interval development of multiple scattered < 1 cm nodules bilaterally, recommended follow up in 3 months   2/17/2025: No interval change without acute consolidation, effusions or lymphadenopathy. No significant difference bilateral lungs numerous randomly scattered nodular opacities and left upper lung lobe posterior segment scarring.     ASSESSMENT:  Pulmonary MAC  Ototoxicity and CKD 2/2 IV Amikacin     PLAN:  -Following previously with Dr. Stroud, has been on Azithromycin 500 mg qd, Rifampin 600 mg qd, Ethambutol 1200 mg qd, and Arikayce 590 mg nebulized qd.  -Currently tolerating medications, and has been following with ophthalmologist (last seen 2 months ago), and sees audiology weekly.   -Will obtain CBC/CMP monthly.  Will get EKG in the next visit.       RTC 3 months. Labs every month       Ophelia Drew MD  Internal Medicine - PGY-1

## 2025-04-25 ENCOUNTER — LAB VISIT (OUTPATIENT)
Dept: LAB | Facility: HOSPITAL | Age: 70
End: 2025-04-25
Payer: MEDICARE

## 2025-04-25 DIAGNOSIS — A31.0 MYCOBACTERIUM AVIUM COMPLEX: ICD-10-CM

## 2025-04-25 LAB
BASOPHILS # BLD AUTO: 0.04 X10(3)/MCL
BASOPHILS NFR BLD AUTO: 0.8 %
EOSINOPHIL # BLD AUTO: 0.15 X10(3)/MCL (ref 0–0.9)
EOSINOPHIL NFR BLD AUTO: 3.2 %
ERYTHROCYTE [DISTWIDTH] IN BLOOD BY AUTOMATED COUNT: 12.6 % (ref 11.5–17)
HCT VFR BLD AUTO: 33.6 % (ref 37–47)
HGB BLD-MCNC: 10.9 G/DL (ref 12–16)
IMM GRANULOCYTES # BLD AUTO: 0.01 X10(3)/MCL (ref 0–0.04)
IMM GRANULOCYTES NFR BLD AUTO: 0.2 %
LYMPHOCYTES # BLD AUTO: 0.85 X10(3)/MCL (ref 0.6–4.6)
LYMPHOCYTES NFR BLD AUTO: 17.9 %
MCH RBC QN AUTO: 30.7 PG (ref 27–31)
MCHC RBC AUTO-ENTMCNC: 32.4 G/DL (ref 33–36)
MCV RBC AUTO: 94.6 FL (ref 80–94)
MONOCYTES # BLD AUTO: 0.4 X10(3)/MCL (ref 0.1–1.3)
MONOCYTES NFR BLD AUTO: 8.4 %
NEUTROPHILS # BLD AUTO: 3.3 X10(3)/MCL (ref 2.1–9.2)
NEUTROPHILS NFR BLD AUTO: 69.5 %
NRBC BLD AUTO-RTO: 0 %
PLATELET # BLD AUTO: 274 X10(3)/MCL (ref 130–400)
PMV BLD AUTO: 9.9 FL (ref 7.4–10.4)
RBC # BLD AUTO: 3.55 X10(6)/MCL (ref 4.2–5.4)
WBC # BLD AUTO: 4.75 X10(3)/MCL (ref 4.5–11.5)

## 2025-04-25 PROCEDURE — 36415 COLL VENOUS BLD VENIPUNCTURE: CPT

## 2025-04-25 PROCEDURE — 85025 COMPLETE CBC W/AUTO DIFF WBC: CPT

## 2025-05-09 DIAGNOSIS — A31.0 MAI (MYCOBACTERIUM AVIUM-INTRACELLULARE): ICD-10-CM

## 2025-05-09 DIAGNOSIS — A31.0 PULMONARY MYCOBACTERIUM AVIUM COMPLEX (MAC) INFECTION: ICD-10-CM

## 2025-05-09 DIAGNOSIS — J47.9 BRONCHIECTASIS WITHOUT COMPLICATION: ICD-10-CM

## 2025-05-09 DIAGNOSIS — A31.0 MYCOBACTERIUM AVIUM COMPLEX: ICD-10-CM

## 2025-05-09 NOTE — TELEPHONE ENCOUNTER
Spoke to Carmen, she needs 90 day on the Rifampin, 30 day on albuterol neb and 30 day on ethambutol, this is how her insurance will pay.

## 2025-05-09 NOTE — TELEPHONE ENCOUNTER
----- Message from Radha sent at 5/9/2025  9:40 AM CDT -----  Pt of  called stating she needs a refill on medications rifAMpin (RIFADIN) 300 MG capsule and albuterol.Pt call back number [phone] advise

## 2025-05-12 RX ORDER — ETHAMBUTOL HYDROCHLORIDE 400 MG/1
1200 TABLET, FILM COATED ORAL DAILY
Qty: 90 TABLET | Refills: 2 | Status: SHIPPED | OUTPATIENT
Start: 2025-05-12 | End: 2025-11-08

## 2025-05-12 RX ORDER — RIFAMPIN 300 MG/1
600 CAPSULE ORAL DAILY
Qty: 180 CAPSULE | Refills: 0 | Status: SHIPPED | OUTPATIENT
Start: 2025-05-12 | End: 2025-11-08

## 2025-05-12 RX ORDER — ALBUTEROL SULFATE 0.83 MG/ML
2.5 SOLUTION RESPIRATORY (INHALATION) EVERY 6 HOURS PRN
Qty: 112 EACH | Refills: 2 | Status: SHIPPED | OUTPATIENT
Start: 2025-05-12 | End: 2025-08-10

## 2025-05-12 NOTE — TELEPHONE ENCOUNTER
I refilled her medication, please confirm she is also taking azithromycin as well as this was not requested for a refill.    Thanks    Roc Ibarra MD  Infectious Diseases Faculty   of Medicine

## 2025-05-21 ENCOUNTER — LAB VISIT (OUTPATIENT)
Dept: LAB | Facility: HOSPITAL | Age: 70
End: 2025-05-21
Payer: MEDICARE

## 2025-05-21 DIAGNOSIS — A31.0 MYCOBACTERIUM AVIUM COMPLEX: ICD-10-CM

## 2025-05-21 DIAGNOSIS — A31.0 MYCOBACTERIUM AVIUM COMPLEX: Primary | ICD-10-CM

## 2025-05-21 LAB
ALBUMIN SERPL-MCNC: 4 G/DL (ref 3.4–4.8)
ALBUMIN/GLOB SERPL: 1.1 RATIO (ref 1.1–2)
ALP SERPL-CCNC: 30 UNIT/L (ref 40–150)
ALT SERPL-CCNC: 11 UNIT/L (ref 0–55)
ANION GAP SERPL CALC-SCNC: 10 MEQ/L
AST SERPL-CCNC: 17 UNIT/L (ref 11–45)
BASOPHILS # BLD AUTO: 0.03 X10(3)/MCL
BASOPHILS NFR BLD AUTO: 0.6 %
BILIRUB SERPL-MCNC: 0.6 MG/DL
BUN SERPL-MCNC: 20.7 MG/DL (ref 9.8–20.1)
CALCIUM SERPL-MCNC: 9.2 MG/DL (ref 8.4–10.2)
CHLORIDE SERPL-SCNC: 102 MMOL/L (ref 98–107)
CO2 SERPL-SCNC: 24 MMOL/L (ref 23–31)
CREAT SERPL-MCNC: 1.13 MG/DL (ref 0.55–1.02)
CREAT/UREA NIT SERPL: 18
EOSINOPHIL # BLD AUTO: 0.16 X10(3)/MCL (ref 0–0.9)
EOSINOPHIL NFR BLD AUTO: 3.2 %
ERYTHROCYTE [DISTWIDTH] IN BLOOD BY AUTOMATED COUNT: 12.5 % (ref 11.5–17)
GFR SERPLBLD CREATININE-BSD FMLA CKD-EPI: 52 ML/MIN/1.73/M2
GLOBULIN SER-MCNC: 3.6 GM/DL (ref 2.4–3.5)
GLUCOSE SERPL-MCNC: 105 MG/DL (ref 82–115)
HCT VFR BLD AUTO: 34.9 % (ref 37–47)
HGB BLD-MCNC: 11.4 G/DL (ref 12–16)
IMM GRANULOCYTES # BLD AUTO: 0.01 X10(3)/MCL (ref 0–0.04)
IMM GRANULOCYTES NFR BLD AUTO: 0.2 %
LYMPHOCYTES # BLD AUTO: 0.92 X10(3)/MCL (ref 0.6–4.6)
LYMPHOCYTES NFR BLD AUTO: 18.7 %
MCH RBC QN AUTO: 31.1 PG (ref 27–31)
MCHC RBC AUTO-ENTMCNC: 32.7 G/DL (ref 33–36)
MCV RBC AUTO: 95.4 FL (ref 80–94)
MONOCYTES # BLD AUTO: 0.45 X10(3)/MCL (ref 0.1–1.3)
MONOCYTES NFR BLD AUTO: 9.1 %
NEUTROPHILS # BLD AUTO: 3.36 X10(3)/MCL (ref 2.1–9.2)
NEUTROPHILS NFR BLD AUTO: 68.2 %
NRBC BLD AUTO-RTO: 0 %
PLATELET # BLD AUTO: 267 X10(3)/MCL (ref 130–400)
PMV BLD AUTO: 9.8 FL (ref 7.4–10.4)
POTASSIUM SERPL-SCNC: 4.3 MMOL/L (ref 3.5–5.1)
PROT SERPL-MCNC: 7.6 GM/DL (ref 5.8–7.6)
RBC # BLD AUTO: 3.66 X10(6)/MCL (ref 4.2–5.4)
SODIUM SERPL-SCNC: 136 MMOL/L (ref 136–145)
WBC # BLD AUTO: 4.93 X10(3)/MCL (ref 4.5–11.5)

## 2025-05-21 PROCEDURE — 36415 COLL VENOUS BLD VENIPUNCTURE: CPT

## 2025-05-21 PROCEDURE — 80053 COMPREHEN METABOLIC PANEL: CPT

## 2025-05-21 PROCEDURE — 85025 COMPLETE CBC W/AUTO DIFF WBC: CPT

## 2025-05-22 ENCOUNTER — RESULTS FOLLOW-UP (OUTPATIENT)
Dept: INFECTIOUS DISEASES | Facility: HOSPITAL | Age: 70
End: 2025-05-22

## 2025-06-12 ENCOUNTER — OFFICE VISIT (OUTPATIENT)
Dept: INFECTIOUS DISEASES | Facility: CLINIC | Age: 70
End: 2025-06-12
Payer: MEDICARE

## 2025-06-12 VITALS
TEMPERATURE: 98 F | RESPIRATION RATE: 16 BRPM | WEIGHT: 196 LBS | BODY MASS INDEX: 32.65 KG/M2 | HEART RATE: 78 BPM | SYSTOLIC BLOOD PRESSURE: 136 MMHG | DIASTOLIC BLOOD PRESSURE: 80 MMHG | HEIGHT: 65 IN

## 2025-06-12 DIAGNOSIS — A31.0 PULMONARY DISEASE DUE TO MYCOBACTERIA: ICD-10-CM

## 2025-06-12 DIAGNOSIS — J47.9 BRONCHIECTASIS WITHOUT COMPLICATION: ICD-10-CM

## 2025-06-12 DIAGNOSIS — A31.0 MYCOBACTERIUM AVIUM COMPLEX: Primary | ICD-10-CM

## 2025-06-12 DIAGNOSIS — H93.8X3 OTOTOXICITY OF BOTH EARS: ICD-10-CM

## 2025-06-12 PROCEDURE — 93005 ELECTROCARDIOGRAM TRACING: CPT

## 2025-06-12 PROCEDURE — 99214 OFFICE O/P EST MOD 30 MIN: CPT | Mod: PBBFAC

## 2025-06-12 RX ORDER — AMIKACIN 590 MG/8.4ML
1 SUSPENSION RESPIRATORY (INHALATION) DAILY
Qty: 30 EACH | Refills: 5 | Status: SHIPPED | OUTPATIENT
Start: 2025-06-12

## 2025-06-12 NOTE — PROGRESS NOTES
[facility] Outpatient INFECTIOUS DISEASES Clinic Note    CHIEF COMPLAINT: Pulmonary MAC    HISTORY OF PRESENT ILLNESS:     Ms. Carmen Mckinley, 70 YOF with PMHx of COPD (18 pack year history), known history of MAC (since 2017). Was initiated on began treatment for pulmonary MAC with azithromycin monotherapy in 6/2020 which continued until 12/2020 but had positive AFB cultures and reticulonodular disease. Therapy changed to Rifampin, Ethambutol, and Azithromycin intermittent dosing, but was transitioned to daily dosing after having persistently positive cultures in 2021. In 6/2022, IV amikacin was added, but pt developed ototoxicity and nephrotoxicity and was stopped on Amikacin around 10/20/2022, with improvement in symptoms. CKD had stabilized around 6/2023, and Ototoxicity had improved following stopping amikacin.  Was continuing on rifampin 600 mg qd, azithromycin 500 mg qd, and ethambutol 1200 mg till 10/2023. Therapy was stopped as she achieved improvement in CT imaging and clearance of sputums 1 year prior.  She was monitored off therapy, however again became symptomatic in 1/2024 with repeated sputums again positive for MAC, treatment with triple therapy resumed on 3/28/24 and Arikayce was added on 6/4/2024. Last positive sputum culture was in 8/22/24, so start of the therapy date. Repeat CT showed no interval changes in the lung nodules in 02/2025    Today, Reports Coughing once in a while but not debilitating. No other acute complaints other wise. Patient still compliant with the triple therapy and Arikayce. Following up with ophthalmology every three months  and ENT every month. No adverse effects noted so far.      6/12/25: Here for f/u for Pulmonary MAC. Denies any complaints today. Denies any SOB, cough, hemoptysis, n/V, abdominal pain. Still following with ophtho - reports no worsning vision or any changes. Hearing is stable, following with audiology every month. She reports working in her yard last year and  wearing a mask. Recent drive to [place] but no other travels.     REVIEW OF SYSTEMS:  Review of Systems   Constitutional:  Negative for chills and fever.   Respiratory:  Negative for cough, sputum production and shortness of breath.    Cardiovascular:  Negative for chest pain and palpitations.   Gastrointestinal:  Negative for abdominal pain, diarrhea, nausea and vomiting.       PREVIOUS MEDICAL HISTORY:  Past Medical History:   Diagnosis Date    Anxiety     Arthritis     Depression     GERD (gastroesophageal reflux disease)     Hyperlipidemia     Hypertension        PREVIOUS SURGICAL HISTORY:  Past Surgical History:   Procedure Laterality Date    APPENDECTOMY       SECTION      ECTOPIC PREGNANCY SURGERY      FRACTURE SURGERY  2021    Wrist    FUNCTIONAL ENDOSCOPIC SINUS SURGERY (FESS) Bilateral     HERNIA REPAIR  1970s    MANDIBLE SURGERY Bilateral     PERIPHERALLY INSERTED CENTRAL CATHETER INSERTION N/A 2022    Procedure: INSERTION, PICC;  Surgeon: Ismael Stroud MD;  Location: [facility];  Service: Infectious Disease;  Laterality: N/A;    TUBAL LIGATION  1989    WRIST FRACTURE SURGERY           ALLERGIES:  Review of patient's allergies indicates:  No Known Allergies      MEDICATIONS:  Current Outpatient Medications on File Prior to Visit   Medication Sig Dispense Refill    albuterol (PROVENTIL) 2.5 mg /3 mL (0.083 %) nebulizer solution Take 3 mLs (2.5 mg total) by nebulization every 6 (six) hours as needed for Wheezing or Shortness of Breath. Rescue 112 each 2    amikacin liposomal-neb.accessr (ARIKAYCE) 590 mg/8.4 mL NbSp Inhale 1 vial into the lungs once daily. 30 each 5    azithromycin (ZITHROMAX) 500 MG tablet Take 1 tablet (500 mg total) by mouth once daily. 90 tablet 1    busPIRone (BUSPAR) 5 MG Tab Take 5 mg by mouth 2 (two) times daily as needed.      ethambutoL (MYAMBUTOL) 400 MG Tab Take 3 tablets (1,200 mg total) by mouth once daily. 90 tablet 2    fenofibrate (TRICOR) 145 MG tablet  "Take 145 mg by mouth.      ferrous sulfate (FEOSOL) 325 mg (65 mg iron) Tab tablet Take 325 mg by mouth every other day.      glucosamine-chondroitin 500-400 mg tablet Take 1 tablet by mouth once daily.      melatonin 10 mg Tab Take by mouth.      metoprolol succinate (TOPROL-XL) 25 MG 24 hr tablet Take 25 mg by mouth once daily.      nebulizer and compressor (PORTABLE NEBULIZER SYSTEM) Tiana Please dispense 1 complete nebulizer machine and accessories 1 each 0    rifAMpin (RIFADIN) 300 MG capsule Take 2 capsules (600 mg total) by mouth once daily. 180 capsule 0    sertraline (ZOLOFT) 100 MG tablet Take 100 mg by mouth once daily.      vitamin D (VITAMIN D3) 1000 units Tab Take 1,000 Units by mouth once daily.      omega 3-dha-epa-fish oil (FISH OIL) 60- mg Cap once daily. (Patient not taking: Reported on 6/12/2025)      PULMO-AIDE COMPRESSOR Tiana use as directed (Patient not taking: Reported on 6/12/2025)      [DISCONTINUED] fish oil-dha-epa 1,200-144-216 mg Cap Take by mouth.      [DISCONTINUED] STERILE SALINE 0.9 % irrigation Irrigate with 900 mLs as directed once.       No current facility-administered medications on file prior to visit.          SOCIAL HISTORY:    reports that she has quit smoking. Her smoking use included cigarettes. She has a 18 pack-year smoking history. She has never used smokeless tobacco. She reports that she does not currently use alcohol. She reports that she does not currently use drugs.      FAMILY HISTORY:   Family History   Problem Relation Name Age of Onset    Asbestos Mother Jonna Agosto     Cancer Mother Jonna Agosto         Mesothelioma    Suicide Father Abhishek Agosto     Depression Father GEORGE.Evgeny Agosto         Suicide    Depression Brother Evgeny Agosto         Suicide       PHYSICAL EXAMINATION:  BP (!) 155/93 (BP Location: Right arm, Patient Position: Sitting)   Pulse 78   Temp 98.1 °F (36.7 °C) (Oral)   Resp 16   Ht 5' 5" (1.651 m)   Wt 88.9 kg (196 " lb)   BMI 32.62 kg/m²  Body mass index is 32.62 kg/m².    Gen: awake, alert, NAD  HEENT: NC/AT, EOMi, anicteric sclera, no rhinorrhea, OP clear  Neck: no cervical LAD  CV: regular rate normal rhythm no murmur  Resp: easy WOB on RA CTABL no w/r/r  Abd: +BS, soft, NTTP, no HSM  Ext: warm, no LE edema  Skin: no rash  Neuro: CN 2-12 grossly intact, UE and LE anti-gravity, no focal deficits       LABORATORY DATA:  Lab Results   Component Value Date    WBC 4.93 05/21/2025    WBC 4.75 04/25/2025    WBC 5.13 03/19/2025    HGB 11.4 (L) 05/21/2025    HGB 10.9 (L) 04/25/2025    HGB 11.0 (L) 03/19/2025     05/21/2025     04/25/2025     03/19/2025    CREATININE 1.13 (H) 05/21/2025    CREATININE 1.10 (H) 03/19/2025    CREATININE 1.13 (H) 02/11/2025    ALT 11 05/21/2025    ALT 11 03/19/2025    ALT 12 02/11/2025    AST 17 05/21/2025    AST 20 03/19/2025    AST 17 02/11/2025    ALKPHOS 30 (L) 05/21/2025    ALKPHOS 30 (L) 03/19/2025    ALKPHOS 29 (L) 02/11/2025    BILITOT 0.6 05/21/2025    BILITOT 0.7 03/19/2025    BILITOT 0.5 02/11/2025       MICROBIOLOGY DATA:  9/6/2024 - Mycobacteria/Nocardia culture negative  9/5/2024 - Mycobacteria/Nocardia culture negative  8/22/2024 - Mycobacteria/Nocardia culture negative  2/8/2024 - Mycobacterial/Nocardia culture positive    IMAGING DATA:  11/13/2024 CT Chest - Interval development of multiple scattered < 1 cm nodules bilaterally, recommended follow up in 3 months   2/17/2025: No interval change without acute consolidation, effusions or lymphadenopathy. No significant difference bilateral lungs numerous randomly scattered nodular opacities and left upper lung lobe posterior segment scarring.     ASSESSMENT:  Pulmonary MAC  Hx of ototoxicity and CKD 2/2 IV Amikacin     PLAN:  -Following previously with Dr. Stroud, has been on Azithromycin 500 mg qd, Rifampin 600 mg qd, Ethambutol 1200 mg qd, and Arikayce 590 mg nebulized qd.  -Currently tolerating medications, and has  been following with ophthalmologist (last seen 2 months ago), and sees audiology.  -Will obtain CBC/CMP monthly - repeat ordered today.  Will get EKG today.  -Discussed she can stop treatment on 8/22/25    RTC 3 months. Labs every month - have ordered    Michael Ford, DO  Internal Medicine - PGY-3

## 2025-06-13 LAB
OHS QRS DURATION: 76 MS
OHS QTC CALCULATION: 419 MS

## 2025-06-26 ENCOUNTER — LAB VISIT (OUTPATIENT)
Dept: LAB | Facility: HOSPITAL | Age: 70
End: 2025-06-26
Payer: MEDICARE

## 2025-06-26 DIAGNOSIS — A31.0 MYCOBACTERIUM AVIUM COMPLEX: ICD-10-CM

## 2025-06-26 LAB
ALBUMIN SERPL-MCNC: 3.8 G/DL (ref 3.4–4.8)
ALBUMIN/GLOB SERPL: 1 RATIO (ref 1.1–2)
ALP SERPL-CCNC: 30 UNIT/L (ref 40–150)
ALT SERPL-CCNC: 11 UNIT/L (ref 0–55)
ANION GAP SERPL CALC-SCNC: 8 MEQ/L
AST SERPL-CCNC: 16 UNIT/L (ref 11–45)
BASOPHILS # BLD AUTO: 0.03 X10(3)/MCL
BASOPHILS NFR BLD AUTO: 0.6 %
BILIRUB SERPL-MCNC: 0.5 MG/DL
BUN SERPL-MCNC: 21.7 MG/DL (ref 9.8–20.1)
CALCIUM SERPL-MCNC: 8.9 MG/DL (ref 8.4–10.2)
CHLORIDE SERPL-SCNC: 102 MMOL/L (ref 98–107)
CO2 SERPL-SCNC: 25 MMOL/L (ref 23–31)
CREAT SERPL-MCNC: 1.08 MG/DL (ref 0.55–1.02)
CREAT/UREA NIT SERPL: 20
EOSINOPHIL # BLD AUTO: 0.18 X10(3)/MCL (ref 0–0.9)
EOSINOPHIL NFR BLD AUTO: 3.7 %
ERYTHROCYTE [DISTWIDTH] IN BLOOD BY AUTOMATED COUNT: 12.7 % (ref 11.5–17)
GFR SERPLBLD CREATININE-BSD FMLA CKD-EPI: 55 ML/MIN/1.73/M2
GLOBULIN SER-MCNC: 3.7 GM/DL (ref 2.4–3.5)
GLUCOSE SERPL-MCNC: 111 MG/DL (ref 82–115)
HCT VFR BLD AUTO: 34.9 % (ref 37–47)
HGB BLD-MCNC: 11.3 G/DL (ref 12–16)
IMM GRANULOCYTES # BLD AUTO: 0.01 X10(3)/MCL (ref 0–0.04)
IMM GRANULOCYTES NFR BLD AUTO: 0.2 %
LYMPHOCYTES # BLD AUTO: 1.02 X10(3)/MCL (ref 0.6–4.6)
LYMPHOCYTES NFR BLD AUTO: 21 %
MCH RBC QN AUTO: 31.2 PG (ref 27–31)
MCHC RBC AUTO-ENTMCNC: 32.4 G/DL (ref 33–36)
MCV RBC AUTO: 96.4 FL (ref 80–94)
MONOCYTES # BLD AUTO: 0.41 X10(3)/MCL (ref 0.1–1.3)
MONOCYTES NFR BLD AUTO: 8.4 %
NEUTROPHILS # BLD AUTO: 3.21 X10(3)/MCL (ref 2.1–9.2)
NEUTROPHILS NFR BLD AUTO: 66.1 %
NRBC BLD AUTO-RTO: 0 %
PLATELET # BLD AUTO: 258 X10(3)/MCL (ref 130–400)
PMV BLD AUTO: 10 FL (ref 7.4–10.4)
POTASSIUM SERPL-SCNC: 4.5 MMOL/L (ref 3.5–5.1)
PROT SERPL-MCNC: 7.5 GM/DL (ref 5.8–7.6)
RBC # BLD AUTO: 3.62 X10(6)/MCL (ref 4.2–5.4)
SODIUM SERPL-SCNC: 135 MMOL/L (ref 136–145)
WBC # BLD AUTO: 4.86 X10(3)/MCL (ref 4.5–11.5)

## 2025-06-26 PROCEDURE — 80053 COMPREHEN METABOLIC PANEL: CPT

## 2025-06-26 PROCEDURE — 85025 COMPLETE CBC W/AUTO DIFF WBC: CPT

## 2025-06-26 PROCEDURE — 36415 COLL VENOUS BLD VENIPUNCTURE: CPT

## 2025-06-27 ENCOUNTER — RESULTS FOLLOW-UP (OUTPATIENT)
Dept: INFECTIOUS DISEASES | Facility: CLINIC | Age: 70
End: 2025-06-27

## 2025-08-05 ENCOUNTER — LAB VISIT (OUTPATIENT)
Dept: LAB | Facility: HOSPITAL | Age: 70
End: 2025-08-05
Payer: MEDICARE

## 2025-08-05 DIAGNOSIS — A31.0 MYCOBACTERIUM AVIUM COMPLEX: ICD-10-CM

## 2025-08-05 LAB
ALBUMIN SERPL-MCNC: 3.9 G/DL (ref 3.4–4.8)
ALBUMIN/GLOB SERPL: 1.3 RATIO (ref 1.1–2)
ALP SERPL-CCNC: 31 UNIT/L (ref 40–150)
ALT SERPL-CCNC: 11 UNIT/L (ref 0–55)
ANION GAP SERPL CALC-SCNC: 8 MEQ/L
AST SERPL-CCNC: 17 UNIT/L (ref 11–45)
BASOPHILS # BLD AUTO: 0.03 X10(3)/MCL
BASOPHILS NFR BLD AUTO: 0.7 %
BILIRUB SERPL-MCNC: 0.5 MG/DL
BUN SERPL-MCNC: 19.4 MG/DL (ref 9.8–20.1)
CALCIUM SERPL-MCNC: 8.9 MG/DL (ref 8.4–10.2)
CHLORIDE SERPL-SCNC: 101 MMOL/L (ref 98–107)
CO2 SERPL-SCNC: 27 MMOL/L (ref 23–31)
CREAT SERPL-MCNC: 1.19 MG/DL (ref 0.55–1.02)
CREAT/UREA NIT SERPL: 16
EOSINOPHIL # BLD AUTO: 0.18 X10(3)/MCL (ref 0–0.9)
EOSINOPHIL NFR BLD AUTO: 4 %
ERYTHROCYTE [DISTWIDTH] IN BLOOD BY AUTOMATED COUNT: 12.5 % (ref 11.5–17)
GFR SERPLBLD CREATININE-BSD FMLA CKD-EPI: 49 ML/MIN/1.73/M2
GLOBULIN SER-MCNC: 3.1 GM/DL (ref 2.4–3.5)
GLUCOSE SERPL-MCNC: 105 MG/DL (ref 82–115)
HCT VFR BLD AUTO: 34 % (ref 37–47)
HGB BLD-MCNC: 11 G/DL (ref 12–16)
IMM GRANULOCYTES # BLD AUTO: 0.02 X10(3)/MCL (ref 0–0.04)
IMM GRANULOCYTES NFR BLD AUTO: 0.4 %
LYMPHOCYTES # BLD AUTO: 0.96 X10(3)/MCL (ref 0.6–4.6)
LYMPHOCYTES NFR BLD AUTO: 21.4 %
MCH RBC QN AUTO: 30.9 PG (ref 27–31)
MCHC RBC AUTO-ENTMCNC: 32.4 G/DL (ref 33–36)
MCV RBC AUTO: 95.5 FL (ref 80–94)
MONOCYTES # BLD AUTO: 0.5 X10(3)/MCL (ref 0.1–1.3)
MONOCYTES NFR BLD AUTO: 11.2 %
NEUTROPHILS # BLD AUTO: 2.79 X10(3)/MCL (ref 2.1–9.2)
NEUTROPHILS NFR BLD AUTO: 62.3 %
NRBC BLD AUTO-RTO: 0 %
PLATELET # BLD AUTO: 267 X10(3)/MCL (ref 130–400)
PMV BLD AUTO: 9.6 FL (ref 7.4–10.4)
POTASSIUM SERPL-SCNC: 4.6 MMOL/L (ref 3.5–5.1)
PROT SERPL-MCNC: 7 GM/DL (ref 5.8–7.6)
RBC # BLD AUTO: 3.56 X10(6)/MCL (ref 4.2–5.4)
SODIUM SERPL-SCNC: 136 MMOL/L (ref 136–145)
WBC # BLD AUTO: 4.48 X10(3)/MCL (ref 4.5–11.5)

## 2025-08-05 PROCEDURE — 85025 COMPLETE CBC W/AUTO DIFF WBC: CPT

## 2025-08-05 PROCEDURE — 80053 COMPREHEN METABOLIC PANEL: CPT

## 2025-08-05 PROCEDURE — 36415 COLL VENOUS BLD VENIPUNCTURE: CPT

## 2025-08-12 ENCOUNTER — OFFICE VISIT (OUTPATIENT)
Dept: INFECTIOUS DISEASES | Facility: CLINIC | Age: 70
End: 2025-08-12
Payer: MEDICARE

## 2025-08-12 VITALS
WEIGHT: 194 LBS | HEIGHT: 65 IN | SYSTOLIC BLOOD PRESSURE: 140 MMHG | RESPIRATION RATE: 16 BRPM | BODY MASS INDEX: 32.32 KG/M2 | HEART RATE: 82 BPM | TEMPERATURE: 98 F | DIASTOLIC BLOOD PRESSURE: 72 MMHG

## 2025-08-12 DIAGNOSIS — A31.0 MYCOBACTERIUM AVIUM COMPLEX: Primary | ICD-10-CM

## 2025-08-12 LAB
OHS QRS DURATION: 80 MS
OHS QTC CALCULATION: 405 MS

## 2025-08-12 PROCEDURE — 93005 ELECTROCARDIOGRAM TRACING: CPT

## 2025-08-12 PROCEDURE — 99214 OFFICE O/P EST MOD 30 MIN: CPT | Mod: PBBFAC

## 2025-08-12 RX ORDER — TRAZODONE HYDROCHLORIDE 50 MG/1
50 TABLET ORAL NIGHTLY
COMMUNITY
Start: 2025-07-19